# Patient Record
Sex: FEMALE | Race: WHITE | Employment: FULL TIME | URBAN - METROPOLITAN AREA
[De-identification: names, ages, dates, MRNs, and addresses within clinical notes are randomized per-mention and may not be internally consistent; named-entity substitution may affect disease eponyms.]

---

## 2017-01-02 ENCOUNTER — VIRTUAL VISIT (OUTPATIENT)
Dept: FAMILY MEDICINE | Facility: OTHER | Age: 52
End: 2017-01-02

## 2017-01-03 NOTE — PROGRESS NOTES
Date:   Clinician: Aneta Munguia  Clinician NPI: [id number]  Patient: KURT RONDON  Patient : 1965  Patient Address: [address] [place] [address]  Patient Phone: [phone]  Visit Protocol: URI  Patient Summary:  KURT is a 51 year old ( : 1965 ) female who initiated a Zip for a presumed sinus infection.     Her  symptoms started gradually 3 weeks ago  and consist of malaise, fever, nasal congestion, post-nasal drainage, cough, loss of appetite, and rhinitis.   She denies dysphagia, nausea, vomiting, itchy eyes, myalgias, chest pain, dyspnea, chills, sore throat, hoarse voice, and ear pain. She denies a history of facial surgery.   Her profuse nasal secretions are blood tinged and green. Her moderate facial pain or pressure feels worse when bending over or leaning forward and is located on both sides of her head. The facial pain or pressure started after the onset of other URI symptoms. She has teeth pain and is confident the tooth pain is not from a cavity, recent dental work or other mouth problems. KURT has a moderate headache. The headache did not start before her other symptoms and is located on both sides of her head. When asked to feel her neck she reported enlarged lymph nodes. KURT noted that the enlarged neck lymph nodes developed AFTER the onset of upper respiratory symptoms. She denies axillary lymphadenopathy.   Her moderately severe (cough every 5-10 minutes) productive cough is more bothersome at night. She believes the cough is caused by post-nasal drainage. Her cough produces yellow sputum.   She has passed urine in the past 12 hours.   KURT denies having COPD or other chronic lung disease.   Pulse: self-reported pulse rate as: 10.0 beats in 10 seconds.    Weight (in lbs): 210.0   She states she is not pregnant and denies breastfeeding. She no longer menstruates.   KURT does not smoke or use smokeless tobacco.   MEDICATIONS:   Thyroid and hydrochlorothiazide (HCTZ)  , ALLERGIES:  NKDA   Clinician Response:  Dear KURT,  Based on the information you have provided, you likely have  acute sinusitis, otherwise known as a sinus infection.   I am prescribing amoxicillin-Pot Clavulanate [Augmentin] 875-125mg. Take one tablet by mouth two times a day for 10 days. There are no refills with this prescription.   Drink plenty of liquids, especially water and take time to rest your body. This may mean taking a nap or going to bed earlier. Your body is fighting an infection and liquids and rest will improve the pace of recovery. Remember to regularly wash your hands and avoid close contact with others to prevent spreading your infection.   Some people develop allergies to antibiotics. If you notice a new rash, significant swelling or difficulty breathing, stop the medication immediately and go into a clinic for physical evaluation.   Some women may also develop a yeast infection as a side effect of taking antibiotics. If you notice symptoms of a yeast infection, please use Zipnosis to get treatment.   If you become pregnant during this course of treatment with medication, stop taking the medication and contact your primary care clinician.   Diagnosis: Acute Sinusitis  Diagnosis ICD: J01.90  Prescription: amoxicillin-Pot Clavulanate (Augmentin) 875-125 mg oral tablet 20 tablets, 10 days supply. Take one tablet by mouth two times a day for 10 days. Refills: 0, Refill as needed: no, Allow substitutions: yes  Prescription Sent At: January 02 18:46:52, 2017  Pharmacy: [facility] [id number] - [phone] - [address] [facility] [place] [address]  Addendum created: January 02 18:23:29, 2017 created by: Aneta Munguia body: Zithromax 250 mg. 2 tab day 1 and 1 tab days 2-5. Dispense #6. No refill

## 2017-01-04 ENCOUNTER — OFFICE VISIT (OUTPATIENT)
Dept: URGENT CARE | Facility: URGENT CARE | Age: 52
End: 2017-01-04
Payer: COMMERCIAL

## 2017-01-04 ENCOUNTER — RADIANT APPOINTMENT (OUTPATIENT)
Dept: GENERAL RADIOLOGY | Facility: CLINIC | Age: 52
End: 2017-01-04
Attending: FAMILY MEDICINE
Payer: COMMERCIAL

## 2017-01-04 VITALS
OXYGEN SATURATION: 99 % | RESPIRATION RATE: 20 BRPM | HEART RATE: 103 BPM | SYSTOLIC BLOOD PRESSURE: 100 MMHG | TEMPERATURE: 98.1 F | DIASTOLIC BLOOD PRESSURE: 66 MMHG

## 2017-01-04 DIAGNOSIS — R05.9 COUGH: ICD-10-CM

## 2017-01-04 DIAGNOSIS — R06.02 SOB (SHORTNESS OF BREATH): ICD-10-CM

## 2017-01-04 DIAGNOSIS — J18.9 PNEUMONIA DUE TO INFECTIOUS ORGANISM, UNSPECIFIED LATERALITY, UNSPECIFIED PART OF LUNG: Primary | ICD-10-CM

## 2017-01-04 DIAGNOSIS — J98.01 BRONCHOSPASM: ICD-10-CM

## 2017-01-04 PROCEDURE — 94640 AIRWAY INHALATION TREATMENT: CPT | Performed by: FAMILY MEDICINE

## 2017-01-04 PROCEDURE — 99214 OFFICE O/P EST MOD 30 MIN: CPT | Mod: 25 | Performed by: FAMILY MEDICINE

## 2017-01-04 PROCEDURE — 71020 XR CHEST 2 VW: CPT

## 2017-01-04 RX ORDER — PREDNISONE 20 MG/1
40 TABLET ORAL DAILY
Qty: 10 TABLET | Refills: 0 | Status: SHIPPED | OUTPATIENT
Start: 2017-01-04 | End: 2017-01-09

## 2017-01-04 RX ORDER — CODEINE PHOSPHATE AND GUAIFENESIN 10; 100 MG/5ML; MG/5ML
2 SOLUTION ORAL EVERY 4 HOURS PRN
Qty: 120 ML | Refills: 0 | Status: SHIPPED | OUTPATIENT
Start: 2017-01-04 | End: 2017-02-20

## 2017-01-04 RX ORDER — IPRATROPIUM BROMIDE AND ALBUTEROL SULFATE 2.5; .5 MG/3ML; MG/3ML
1 SOLUTION RESPIRATORY (INHALATION) ONCE
Qty: 3 ML | Refills: 0
Start: 2017-01-04 | End: 2017-01-04

## 2017-01-04 RX ORDER — DOXYCYCLINE 100 MG/1
100 CAPSULE ORAL 2 TIMES DAILY
Qty: 20 CAPSULE | Refills: 0 | Status: SHIPPED | OUTPATIENT
Start: 2017-01-04 | End: 2017-02-20

## 2017-01-04 NOTE — MR AVS SNAPSHOT
After Visit Summary   1/4/2017    Amita Finley    MRN: [id number]           Patient Information     Date Of Birth          1965        Visit Information        Provider Department      1/4/2017 2:45 PM Fam Lopez MD [facility]        Today's Diagnoses     Pneumonia due to infectious organism, unspecified laterality, unspecified part of lung    -  1     SOB (shortness of breath)         Bronchospasm         Cough           Care Instructions    Take full course of antibiotic (okay to finish Zpak, add doxycyline)  Okay to take ibuprofen 200 mg - 4 tablets (800 mg) every 8 hours as needed.  Okay to take tylenol 500 mg - 2 tablets (1000 mg) every 6-8 hours as needed, do not exceed 3000 mg in 24 hours.  Take full course of prednisone for wheezing and shortness of breath.  Use combivent inhaler (this already has albuterol) every 4 hours as needed.  Use robitussin with codeine at bedtime to help with cough.      Pneumonia (Adult)  Pneumonia is an infection deep within the lungs. It is in the small air sacs (alveoli). Pneumonia may be caused by a virus or bacteria. Pneumonia caused by bacteria is usually treated with an antibiotic. Severe cases may need to be treated in the hospital. Milder cases can be treated at home. Symptoms usually start to get better during the first 2 days of treatment.    Home care  Follow these guidelines when caring for yourself at home:    Rest at home for the first 2 to 3 days, or until you feel stronger. Don t let yourself get overly tired when you go back to your activities.    Stay away from cigarette smoke - yours or other people s.    You may use acetaminophen or ibuprofen to control fever or pain, unless another medicine was prescribed. If you have chronic liver or kidney disease, talk with your health care provider before using these medicines. Also talk with your provider if you ve had a stomach ulcer or GI bleeding. Don t give aspirin to  anyone younger than 18 years of age who is ill with a fever. It may cause severe liver damage.    Your appetite may be poor, so a light diet is fine.    Drink 6 to 8 glasses of fluids every day to make sure you are getting enough fluids. Beverages can include water, sport drinks, sodas without caffeine, juices, tea, or soup. Fluids will help loosen secretions in the lung. This will make it easier for you to cough up the phlegm (sputum). If you also have heart or kidney disease, check with your health care provider before you drink extra fluids.    Take antibiotic medicine prescribed until it is all gone, even if you are feeling better after a few days.  Follow-up care  Follow up with your health care provider in the next 2 to 3 days, or as advised. This is to be sure the medicine is helping you get better.  If you are 65 or older, you should get a pneumococcal vaccine and a yearly flu (influenza) shot. You should also get these vaccines if you have chronic lung disease like asthma, emphysema, or COPD. Ask your provider about this.  When to seek medical advice  Call your health care provider right away if any of these occur:    You don t get better within the first 48 hours of treatment    Shortness of breath gets worse    Rapid breathing (more than 25 breaths per minute)    Coughing up blood    Chest pain gets worse with breathing    Fever of 102 F (38 C) or higher that doesn t get better with fever medicine    Weakness, dizziness, or fainting that gets worse    Thirst or dry mouth that gets worse    Sinus pain, headache, or a stiff neck    Chest pain not caused by coughing    [id number] [employer]. [address] [place] [address], [place] [address]. All rights reserved. This information is not intended as a substitute for professional medical care. Always follow your healthcare professional's instructions.              Follow-ups after your visit        Your next 10 appointments already scheduled     Jan 12,  2017  1:15 PM   MA SCREENING DIGITAL BILATERAL with [id number]   [facility] ([facility])    [address] [facility], [address]  [facility] [id number] [phone]           Do not use any powder, lotion or deodorant under your arms or on your breast. If you do, we will ask you to remove it before your exam.  Wear comfortable, two-piece clothing.  If you have any allergies, tell your care team.  Bring any previous mammograms from other facilities or have them mailed to the breast center. This mammogram location, [facility], now offers 3D mammography. It doesn't replace a screening mammogram and can be done with a regular screening mammogram. It is optional and not all insurances will pay for it. 3D mammography is a special kind of mammogram that produces a three-dimensional image of the breast by using low dose-xrays. 3D allows the radiologist to see the breast tissue differently from 2D, which reduces the chance of repeat testing due to overlapping breast tissue. If you are interested in have a 3D mammogram, please check with your insurance before you arrive for your exam. On the day of your exam you will be asked if you would like 3D imaging.            Jan 13, 2017   Procedure with Ginny Caro MD   [facility] ([facility])    [address] [place]  [facility] [address]   [phone]           [facility] is located at [address] [facility]. [place]              Who to contact     If you have questions or need follow up information about today's clinic visit or your schedule please contact [facility] directly at [phone].  Normal or non-critical lab and imaging results will be communicated to you by MyChart, letter or phone within 4 business days after the clinic has received the results. If you do not hear from us within 7 days, please contact the clinic through MyChart or phone. If you have a  critical or abnormal lab result, we will notify you by phone as soon as possible.  Submit refill requests through [employer] or call your pharmacy and they will forward the refill request to us. Please allow 3 business days for your refill to be completed.          Additional Information About Your Visit        [employer] Information     [employer] gives you secure access to your electronic health record. If you see a primary care provider, you can also send messages to your care team and make appointments. If you have questions, please call your primary care clinic.  If you do not have a primary care provider, please call [phone] and they will assist you.        Care EveryWhere ID     This is your Care EveryWhere ID. This could be used by other organizations to access your [place] medical records  [id number]        Your Vitals Were     Pulse Temperature Respirations Pulse Oximetry          103 98.1  F (36.7  C) (Oral) 20 99%         Blood Pressure from Last 3 Encounters:   01/04/17 100/66   12/07/16 111/69   11/28/16 116/78    Weight from Last 3 Encounters:   12/07/16 207 lb 12.8 oz (94.257 kg)   11/28/16 206 lb (93.441 kg)   10/17/16 200 lb (90.719 kg)              We Performed the Following     INHALATION/NEBULIZER TREATMENT, INITIAL          Today's Medication Changes          These changes are accurate as of: 1/4/17  4:15 PM.  If you have any questions, ask your nurse or doctor.               Start taking these medicines.        Dose/Directions    doxycycline 100 MG capsule   Commonly known as:  VIBRAMYCIN   Used for:  Pneumonia due to infectious organism, unspecified laterality, unspecified part of lung   Started by:  Fam Lopez MD        Dose:  100 mg   Take 1 capsule (100 mg) by mouth 2 times daily   Quantity:  20 capsule   Refills:  0       guaiFENesin-codeine 100-10 MG/5ML Soln solution   Commonly known as:  ROBITUSSIN AC   Used for:  Cough   Started by:  Fam Lopez MD        Dose:  2 tsp.   Take 10 mLs  by mouth every 4 hours as needed for cough   Quantity:  120 mL   Refills:  0       * ipratropium - albuterol 0.5 mg/2.5 mg/3 mL 0.5-2.5 (3) MG/3ML neb solution   Commonly known as:  DUONEB   Used for:  SOB (shortness of breath)   Started by:  Fam Lopez MD        Dose:  1 vial   Take 1 vial (3 mLs) by nebulization once for 1 dose   Quantity:  3 mL   Refills:  0       * Ipratropium-Albuterol  MCG/ACT inhaler   Commonly known as:  COMBIVENT RESPIMAT   Used for:  SOB (shortness of breath)   Started by:  Fam Lopez MD        Dose:  1 puff   Inhale 1 puff into the lungs 4 times daily Not to exceed 6 doses per day.   Quantity:  1 Inhaler   Refills:  1       predniSONE 20 MG tablet   Commonly known as:  DELTASONE   Used for:  Bronchospasm   Started by:  Fam Lopez MD        Dose:  40 mg   Take 2 tablets (40 mg) by mouth daily for 5 days   Quantity:  10 tablet   Refills:  0       * Notice:  This list has 2 medication(s) that are the same as other medications prescribed for you. Read the directions carefully, and ask your doctor or other care provider to review them with you.         Where to get your medicines      These medications were sent to [facility] - [address]   [address]  [address], [place] [address]     Phone:  [phone]    - doxycycline 100 MG capsule  - Ipratropium-Albuterol  MCG/ACT inhaler  - predniSONE 20 MG tablet      Some of these will need a paper prescription and others can be bought over the counter.  Ask your nurse if you have questions.     Bring a paper prescription for each of these medications    - guaiFENesin-codeine 100-10 MG/5ML Soln solution    You don't need a prescription for these medications    - ipratropium - albuterol 0.5 mg/2.5 mg/3 mL 0.5-2.5 (3) MG/3ML neb solution             Primary Care Provider Office Phone # Fax #    Amanda Barros [phone]       [facility] [address]  [place] [address]        Thank you!     Thank you for choosing [facility]  for your care. Our goal is always to provide you with excellent care. Hearing back from our patients is one way we can continue to improve our services. Please take a few minutes to complete the written survey that you may receive in the mail after your visit with us. Thank you!             Your Updated Medication List - Protect others around you: Learn how to safely use, store and throw away your medicines at www.disposemymeds.org.          This list is accurate as of: 1/4/17  4:15 PM.  Always use your most recent med list.                   Brand Name Dispense Instructions for use    diclofenac 1 % Gel topical gel    VOLTAREN    100 g    Apply 4 grams to ankle or 2 grams to hands four times daily using enclosed dosing card.       doxycycline 100 MG capsule    VIBRAMYCIN    20 capsule    Take 1 capsule (100 mg) by mouth 2 times daily       guaiFENesin-codeine 100-10 MG/5ML Soln solution    ROBITUSSIN AC    120 mL    Take 10 mLs by mouth every 4 hours as needed for cough       hydrochlorothiazide 25 MG tablet    HYDRODIURIL    30 tablet    Take 1 tablet (25 mg) by mouth daily       * ipratropium - albuterol 0.5 mg/2.5 mg/3 mL 0.5-2.5 (3) MG/3ML neb solution    DUONEB    3 mL    Take 1 vial (3 mLs) by nebulization once for 1 dose       * Ipratropium-Albuterol  MCG/ACT inhaler    COMBIVENT RESPIMAT    1 Inhaler    Inhale 1 puff into the lungs 4 times daily Not to exceed 6 doses per day.       * levothyroxine 125 MCG tablet    SYNTHROID/LEVOTHROID    15 tablet    Take 1 pill every other day alternating with 137 mcg.       * levothyroxine 137 MCG tablet    SYNTHROID/LEVOTHROID    15 tablet    Take one every other day alternating with 125mcg dose       predniSONE 20 MG tablet    DELTASONE    10 tablet    Take 2 tablets (40 mg) by mouth daily for 5 days       spironolactone 50 MG tablet    ALDACTONE    60 tablet     Take 1-2 tablets ( mg) by mouth daily       traZODone 50 MG tablet    DESYREL    180 tablet    Take 2 tablets (100 mg) by mouth nightly as needed for sleep       * Notice:  This list has 4 medication(s) that are the same as other medications prescribed for you. Read the directions carefully, and ask your doctor or other care provider to review them with you.

## 2017-01-04 NOTE — NURSING NOTE
"Chief Complaint   Patient presents with     Urgent Care     Cough     x 2 1/2 wks, crackling, SOB, unable to sleep, Pt had virtual visit, given zpak for sinus infection, pt concerned with SOB, pt reports using albuterol inhaler with no relief       Initial /66 mmHg  Pulse 103  Temp(Src) 98.1  F (36.7  C) (Oral)  Resp 20  SpO2 99% Estimated body mass index is 33.04 kg/(m^2) as calculated from the following:    Height as of 12/7/16: 5' 6.5\" (1.689 m).    Weight as of 12/7/16: 207 lb 12.8 oz (94.257 kg).  BP completed using cuff size: aleksandar Valadez CMA                                1/4/2017 2:49 PM       "

## 2017-01-04 NOTE — PROGRESS NOTES
"SUBJECTIVE:   Amita Filney is a 51 year old female presenting with a chief complaint of \"cold symptoms\".  Initially had cold symptoms mid-December, seemed to get a little better but over the weekend developed more cough and SOB.  Patient will wake up from coughing, hearing crackles.  Patient was treated thru Zipnosis for sinus infection with Zpak but feels like cough did not improve.  Patient had flu vaccination thru work.  Onset of symptoms was 2 week(s) ago.  Course of illness is worsening.    Severity moderate  Current and Associated symptoms: cough, SOB  Treatment measures tried include Fluids, OTC meds, Rest, Inhaler (name: albuterol) and Zpak.  Predisposing factors include : prior smoker.    Past Medical History   Diagnosis Date     Hypothyroidism      Antiphospholipid antibodies positive 12/14/2011     Baker's cyst of knee      Obesity      Current Outpatient Prescriptions   Medication Sig Dispense Refill     levothyroxine (SYNTHROID/LEVOTHROID) 125 MCG tablet Take 1 pill every other day alternating with 137 mcg. 15 tablet 8     levothyroxine (SYNTHROID/LEVOTHROID) 137 MCG tablet Take one every other day alternating with 125mcg dose 15 tablet 8     traZODone (DESYREL) 50 MG tablet Take 2 tablets (100 mg) by mouth nightly as needed for sleep 180 tablet 1     hydrochlorothiazide (HYDRODIURIL) 25 MG tablet Take 1 tablet (25 mg) by mouth daily 30 tablet 1     spironolactone (ALDACTONE) 50 MG tablet Take 1-2 tablets ( mg) by mouth daily 60 tablet 3     diclofenac (VOLTAREN) 1 % GEL Apply 4 grams to ankle or 2 grams to hands four times daily using enclosed dosing card. 100 g 1     Social History   Substance Use Topics     Smoking status: Former Smoker -- 1.50 packs/day for 20 years     Types: Cigarettes     Quit date: 01/01/1996     Smokeless tobacco: Never Used     Alcohol Use: 1.8 oz/week     3 Standard drinks or equivalent per week       ROS:  CONSTITUTIONAL:POSITIVE  for fatigue, malaise and " myalgias  INTEGUMENTARY/SKIN: NEGATIVE for worrisome rashes, moles or lesions  ENT/MOUTH: POSITIVE for hoarseness, nasal congestion, sinus pressure and sore throat  RESP:POSITIVE for cough-non productive, cough-productive, SOB/dyspnea and wheezing  CV: NEGATIVE for chest pain, palpitations or peripheral edema  GI: NEGATIVE for nausea, abdominal pain, heartburn, or change in bowel habits    OBJECTIVE  :/66 mmHg  Pulse 103  Temp(Src) 98.1  F (36.7  C) (Oral)  Resp 20  SpO2 99%  GENERAL APPEARANCE: healthy, alert and no distress  EYES: EOMI,  PERRL, conjunctiva clear  HENT: ear canals and TM's normal.  Nose and mouth without ulcers, erythema or lesions.  Tenderness on maxillary sinuses on percussion  NECK: supple, nontender, no lymphadenopathy  RESP: lungs with wheezes and rhonchi, improved after duoneb treatment with scattered wheezes, decrease BS on right  CV: regular rates and rhythm, normal S1 S2, no murmur noted  NEURO: Normal strength and tone, sensory exam grossly normal,  normal speech and mentation  SKIN: no suspicious lesions or rashes    CXR - patchy infiltrate on right lower lung, no pleural effusion, no pneumothorax    ASSESSMENT/PLAN:  (J18.9) Pneumonia due to infectious organism, unspecified laterality, unspecified part of lung  (primary encounter diagnosis)  Plan: doxycycline (VIBRAMYCIN) 100 MG capsule            (R06.02) SOB (shortness of breath)  Comment: bronchospasm/pneumonia  Plan: XR Chest 2 Views, ipratropium - albuterol 0.5         mg/2.5 mg/3 mL (DUONEB) 0.5-2.5 (3) MG/3ML neb         solution, INHALATION/NEBULIZER TREATMENT,         INITIAL, Ipratropium-Albuterol (COMBIVENT         RESPIMAT)  MCG/ACT inhaler            (J98.01) Bronchospasm  Plan: predniSONE (DELTASONE) 20 MG tablet            (R05) Cough  Plan: guaiFENesin-codeine (ROBITUSSIN AC) 100-10         MG/5ML SOLN solution            Reviewed symptomatic treatment, plenty of fluids and rest.  Will add doxycycline for  probable early pneumonia.  RX for prednisone burst for bronchospasm, encourage to continue with albuterol inhaler to help with symptoms.  RX for Anoro Ellipta given (combivent too expensive).  RX for margoth AC given to help with cough.    Follow up with primary in 2 days, to ER if symptoms worsens.    Fam Lopez MD  January 4, 2017 4:50 PM

## 2017-01-04 NOTE — PATIENT INSTRUCTIONS
Take full course of antibiotic (okay to finish Zpak, add doxycyline)  Okay to take ibuprofen 200 mg - 4 tablets (800 mg) every 8 hours as needed.  Okay to take tylenol 500 mg - 2 tablets (1000 mg) every 6-8 hours as needed, do not exceed 3000 mg in 24 hours.  Take full course of prednisone for wheezing and shortness of breath.  Use combivent inhaler (this already has albuterol) every 4 hours as needed.  Use robitussin with codeine at bedtime to help with cough.      Pneumonia (Adult)  Pneumonia is an infection deep within the lungs. It is in the small air sacs (alveoli). Pneumonia may be caused by a virus or bacteria. Pneumonia caused by bacteria is usually treated with an antibiotic. Severe cases may need to be treated in the hospital. Milder cases can be treated at home. Symptoms usually start to get better during the first 2 days of treatment.    Home care  Follow these guidelines when caring for yourself at home:    Rest at home for the first 2 to 3 days, or until you feel stronger. Don t let yourself get overly tired when you go back to your activities.    Stay away from cigarette smoke - yours or other people s.    You may use acetaminophen or ibuprofen to control fever or pain, unless another medicine was prescribed. If you have chronic liver or kidney disease, talk with your health care provider before using these medicines. Also talk with your provider if you ve had a stomach ulcer or GI bleeding. Don t give aspirin to anyone younger than 18 years of age who is ill with a fever. It may cause severe liver damage.    Your appetite may be poor, so a light diet is fine.    Drink 6 to 8 glasses of fluids every day to make sure you are getting enough fluids. Beverages can include water, sport drinks, sodas without caffeine, juices, tea, or soup. Fluids will help loosen secretions in the lung. This will make it easier for you to cough up the phlegm (sputum). If you also have heart or kidney disease, check with  your health care provider before you drink extra fluids.    Take antibiotic medicine prescribed until it is all gone, even if you are feeling better after a few days.  Follow-up care  Follow up with your health care provider in the next 2 to 3 days, or as advised. This is to be sure the medicine is helping you get better.  If you are 65 or older, you should get a pneumococcal vaccine and a yearly flu (influenza) shot. You should also get these vaccines if you have chronic lung disease like asthma, emphysema, or COPD. Ask your provider about this.  When to seek medical advice  Call your health care provider right away if any of these occur:    You don t get better within the first 48 hours of treatment    Shortness of breath gets worse    Rapid breathing (more than 25 breaths per minute)    Coughing up blood    Chest pain gets worse with breathing    Fever of 102 F (38 C) or higher that doesn t get better with fever medicine    Weakness, dizziness, or fainting that gets worse    Thirst or dry mouth that gets worse    Sinus pain, headache, or a stiff neck    Chest pain not caused by coughing    1702-1063 [employer]. [address] [place] [address], [place] [address]. All rights reserved. This information is not intended as a substitute for professional medical care. Always follow your healthcare professional's instructions.

## 2017-01-04 NOTE — NURSING NOTE
The following nebulizer treatment was given:     MEDICATION: Duoneb  : [employer]  LOT #: [id number]  EXPIRATION DATE:  09/2018  NDC # 9660-9627-97    Tonia Valadez CMA                                1/4/2017 4:23 PM

## 2017-01-24 DIAGNOSIS — R60.0 EDEMA OF BOTH LEGS: Primary | ICD-10-CM

## 2017-01-24 NOTE — TELEPHONE ENCOUNTER
HCTZ 25MG      Last Written Prescription Date: 11/10/2016  Last Fill Quantity: 30, # refills: 1  Last Office Visit with [facility], [facility] or [facility] prescribing provider: 11/28/2016       POTASSIUM   Date Value Ref Range Status   11/28/2016 3.8 3.4 - 5.3 mmol/L Final     CREATININE   Date Value Ref Range Status   11/28/2016 0.93 0.52 - 1.04 mg/dL Final     BP Readings from Last 3 Encounters:   01/04/17 100/66   12/07/16 111/69   11/28/16 116/78

## 2017-01-25 RX ORDER — HYDROCHLOROTHIAZIDE 25 MG/1
25 TABLET ORAL DAILY
Qty: 90 TABLET | Refills: 2 | Status: SHIPPED | OUTPATIENT
Start: 2017-01-25 | End: 2017-11-13

## 2017-01-25 NOTE — TELEPHONE ENCOUNTER
Prescription approved per [facility] Refill Protocol.  Kimi Hunt RN  Message handled by Nurse Triage.

## 2017-02-20 ENCOUNTER — OFFICE VISIT (OUTPATIENT)
Dept: PEDIATRICS | Facility: CLINIC | Age: 52
End: 2017-02-20
Payer: COMMERCIAL

## 2017-02-20 VITALS
BODY MASS INDEX: 34.7 KG/M2 | SYSTOLIC BLOOD PRESSURE: 114 MMHG | WEIGHT: 215.9 LBS | DIASTOLIC BLOOD PRESSURE: 72 MMHG | HEART RATE: 88 BPM | HEIGHT: 66 IN | OXYGEN SATURATION: 99 % | TEMPERATURE: 98.2 F

## 2017-02-20 DIAGNOSIS — E03.9 HYPOTHYROIDISM, UNSPECIFIED TYPE: ICD-10-CM

## 2017-02-20 DIAGNOSIS — F41.9 ANXIETY: Primary | ICD-10-CM

## 2017-02-20 DIAGNOSIS — E66.09 NON MORBID OBESITY DUE TO EXCESS CALORIES: ICD-10-CM

## 2017-02-20 DIAGNOSIS — F32.1 MAJOR DEPRESSIVE DISORDER, SINGLE EPISODE, MODERATE (H): ICD-10-CM

## 2017-02-20 LAB
T4 FREE SERPL-MCNC: 1.06 NG/DL (ref 0.76–1.46)
TSH SERPL DL<=0.005 MIU/L-ACNC: 4.5 MU/L (ref 0.4–4)

## 2017-02-20 PROCEDURE — 36415 COLL VENOUS BLD VENIPUNCTURE: CPT | Performed by: INTERNAL MEDICINE

## 2017-02-20 PROCEDURE — 84443 ASSAY THYROID STIM HORMONE: CPT | Performed by: INTERNAL MEDICINE

## 2017-02-20 PROCEDURE — 99214 OFFICE O/P EST MOD 30 MIN: CPT | Performed by: INTERNAL MEDICINE

## 2017-02-20 PROCEDURE — 84439 ASSAY OF FREE THYROXINE: CPT | Performed by: INTERNAL MEDICINE

## 2017-02-20 RX ORDER — HYDROXYZINE HYDROCHLORIDE 25 MG/1
25-50 TABLET, FILM COATED ORAL EVERY 6 HOURS PRN
Qty: 60 TABLET | Refills: 1 | Status: SHIPPED | OUTPATIENT
Start: 2017-02-20 | End: 2018-09-25

## 2017-02-20 ASSESSMENT — ANXIETY QUESTIONNAIRES
3. WORRYING TOO MUCH ABOUT DIFFERENT THINGS: NEARLY EVERY DAY
IF YOU CHECKED OFF ANY PROBLEMS ON THIS QUESTIONNAIRE, HOW DIFFICULT HAVE THESE PROBLEMS MADE IT FOR YOU TO DO YOUR WORK, TAKE CARE OF THINGS AT HOME, OR GET ALONG WITH OTHER PEOPLE: VERY DIFFICULT
GAD7 TOTAL SCORE: 17
6. BECOMING EASILY ANNOYED OR IRRITABLE: MORE THAN HALF THE DAYS
5. BEING SO RESTLESS THAT IT IS HARD TO SIT STILL: MORE THAN HALF THE DAYS
7. FEELING AFRAID AS IF SOMETHING AWFUL MIGHT HAPPEN: MORE THAN HALF THE DAYS
1. FEELING NERVOUS, ANXIOUS, OR ON EDGE: NEARLY EVERY DAY
2. NOT BEING ABLE TO STOP OR CONTROL WORRYING: NEARLY EVERY DAY

## 2017-02-20 ASSESSMENT — PATIENT HEALTH QUESTIONNAIRE - PHQ9: 5. POOR APPETITE OR OVEREATING: MORE THAN HALF THE DAYS

## 2017-02-20 NOTE — MR AVS SNAPSHOT
After Visit Summary   2/20/2017    Amita Finley    MRN: [id number]           Patient Information     Date Of Birth          1965        Visit Information        Provider Department      2/20/2017 9:20 AM Amanda Barros MD [facility]'s Diagnoses     Anxiety    -  1    Major depressive disorder, single episode, moderate (H)        Hypothyroidism, unspecified type          Care Instructions    1. Check thyroid today  2. Start sertraline (zoloft) 50 mg once a day  - if have side effects, can cut in half for a few days and then go back up  3. Follow-up in 1 month (can do phone or e-visit if that is easier)  4. Look into on-line therapy options    Here are some other options in the area for in-person therapy  1. Meenakshi Das - [facility] in [place] - [phone]  2. Denia Dillard - [facility] - [phone]  3. Shannon Montes [facility] in [place] - [phone]          Follow-ups after your visit        Who to contact     If you have questions or need follow up information about today's clinic visit or your schedule please contact [facility] directly at [phone].  Normal or non-critical lab and imaging results will be communicated to you by R2Ghart, letter or phone within 4 business days after the clinic has received the results. If you do not hear from us within 7 days, please contact the clinic through R2Ghart or phone. If you have a critical or abnormal lab result, we will notify you by phone as soon as possible.  Submit refill requests through [employer]uline or call your pharmacy and they will forward the refill request to us. Please allow 3 business days for your refill to be completed.          Additional Information About Your Visit        R2GharUtterz Information     [employer] gives you secure access to your electronic health record. If you see a primary care  "provider, you can also send messages to your care team and make appointments. If you have questions, please call your primary care clinic.  If you do not have a primary care provider, please call [phone] and they will assist you.        Care EveryWhere ID     This is your Care EveryWhere ID. This could be used by other organizations to access your [facility] medical records  [id number]        Your Vitals Were     Pulse Temperature Height Pulse Oximetry BMI (Body Mass Index)       88 98.2  F (36.8  C) (Tympanic) 5' 6\" (1.676 m) 99% 34.85 kg/m2        Blood Pressure from Last 3 Encounters:   02/20/17 114/72   01/04/17 100/66   12/07/16 111/69    Weight from Last 3 Encounters:   02/20/17 215 lb 14.4 oz (97.9 kg)   12/07/16 207 lb 12.8 oz (94.3 kg)   11/28/16 206 lb (93.4 kg)              We Performed the Following     TSH with free T4 reflex          Today's Medication Changes          These changes are accurate as of: 2/20/17 10:13 AM.  If you have any questions, ask your nurse or doctor.               Start taking these medicines.        Dose/Directions    sertraline 50 MG tablet   Commonly known as:  ZOLOFT   Used for:  Anxiety, Major depressive disorder, single episode, moderate (H)   Started by:  Amanda Barros MD        Dose:  50 mg   Take 1 tablet (50 mg) by mouth daily   Quantity:  30 tablet   Refills:  1            Where to get your medicines      These medications were sent to [facility] - [address]   [address], [place] [address]     Phone:  [phone]     sertraline 50 MG tablet                Primary Care Provider Office Phone # Fax #    Amanda Barros [phone]       [facility] [place] [address] [place] [address]        Thank you!     [facility] you [facility]  [facility] your care. Our goal is always to provide you with excellent care. Hearing back from our " patients is one way we can continue to improve our services. Please take a few minutes to complete the written survey that you may receive in the mail after your visit with us. Thank you!             Your Updated Medication List - Protect others around you: Learn how to safely use, store and throw away your medicines at www.disposemymeds.org.          This list is accurate as of: 2/20/17 10:13 AM.  Always use your most recent med list.                   Brand Name Dispense Instructions for use    diclofenac 1 % Gel topical gel    VOLTAREN    100 g    Apply 4 grams to ankle or 2 grams to hands four times daily using enclosed dosing card.       hydrochlorothiazide 25 MG tablet    HYDRODIURIL    90 tablet    Take 1 tablet (25 mg) by mouth daily       * levothyroxine 125 MCG tablet    SYNTHROID/LEVOTHROID    15 tablet    Take 1 pill every other day alternating with 137 mcg.       * levothyroxine 137 MCG tablet    SYNTHROID/LEVOTHROID    15 tablet    Take one every other day alternating with 125mcg dose       sertraline 50 MG tablet    ZOLOFT    30 tablet    Take 1 tablet (50 mg) by mouth daily       spironolactone 50 MG tablet    ALDACTONE    60 tablet    Take 1-2 tablets ( mg) by mouth daily       traZODone 50 MG tablet    DESYREL    180 tablet    Take 2 tablets (100 mg) by mouth nightly as needed for sleep       * Notice:  This list has 2 medication(s) that are the same as other medications prescribed for you. Read the directions carefully, and ask your doctor or other care provider to review them with you.

## 2017-02-20 NOTE — PATIENT INSTRUCTIONS
1. Check thyroid today  2. Start sertraline (zoloft) 50 mg once a day  - if have side effects, can cut in half for a few days and then go back up  3. Follow-up in 1 month (can do phone or e-visit if that is easier)  4. Look into on-line therapy options    Here are some other options in the area for in-person therapy  1. Meenakshi Das - [facility] in [place] - [phone]  2. Denia Dillard - [facility] in [place] - [phone]  3. Shannon Montes - [facility] in [place] - [phone]

## 2017-02-20 NOTE — NURSING NOTE
"Chief Complaint   Patient presents with     Anxiety     Depression       Initial /72 (BP Location: Right arm, Patient Position: Chair, Cuff Size: Adult Large)  Pulse 88  Temp 98.2  F (36.8  C) (Tympanic)  Ht 5' 6\" (1.676 m)  Wt 215 lb 14.4 oz (97.9 kg)  SpO2 99%  BMI 34.85 kg/m2 Estimated body mass index is 34.85 kg/(m^2) as calculated from the following:    Height as of this encounter: 5' 6\" (1.676 m).    Weight as of this encounter: 215 lb 14.4 oz (97.9 kg).  Medication Reconciliation: complete   Niya Brandon LPN      "

## 2017-02-20 NOTE — PROGRESS NOTES
SUBJECTIVE:                                                    Amita Finley is a 51 year old female who presents to clinic today for the following health issues:    Abnormal Mood Symptoms      Duration: 6 months off and on but worsening    Description:  Depression: YES  Anxiety: YES  Panic attacks: YES     Accompanying signs and symptoms: see PHQ-9 and BROOKS scores    History (similar episodes/previous evaluation): postpartum 19 years ago    Precipitating or alleviating factors: father with dementia and no other caregivers, works full time +, family issues    Therapies tried and outcome: none    52 y/o F with history of post-partum depression in the past with 6 months of worsening anxiety and depression symptoms. Having panic attack symptoms 2-3 times a week with increased activity, feels like heart is racing. Happens when starts to think about too many things. Will last for 30 minutes or so. Most often at night, but happens other times too. Not sleeping well. Has been using trazodone at times, but tries not to use for 4 or more nights in a row because doesn't want to stop working. Has a lot of stressors. Father with progressive dementia. Mother trying to cover it up because does not want her lifestyle to change. Currently down in [place] for the winter, but will be returning home soon and needs to sell their house and find them a different place to live. Has been functional at work and home, but does not feel well. Also frustrated about weight gain. Unable to kickbox with hematoma in right lower leg that keeps bleeding and has difficulty finding times to get the exercise in. Has some suicidal ideation, but willing to contract for safety. Reports would never do anything because it would impact her children.     -------------------------------------    Problem list and histories reviewed & adjusted, as indicated.  Additional history: as documented    ROS:  Constitutional, HEENT, cardiovascular, pulmonary, gi and gu  "systems are negative, except as otherwise noted.    Problem list, Medication list, Allergies, and Medical/Social/Surgical histories reviewed in EPIC and updated as appropriate.    OBJECTIVE:                                                    /72 (BP Location: Right arm, Patient Position: Chair, Cuff Size: Adult Large)  Pulse 88  Temp 98.2  F (36.8  C) (Tympanic)  Ht 5' 6\" (1.676 m)  Wt 215 lb 14.4 oz (97.9 kg)  SpO2 99%  BMI 34.85 kg/m2   Body mass index is 34.85 kg/(m^2).  General Appearance: healthy, alert and no distress  Eyes:   no discharge, erythema.  Normal pupils.  Psychiatric: affect flat and anxious    Diagnostic Test Results:  Results for orders placed or performed in visit on 02/20/17   TSH with free T4 reflex   Result Value Ref Range    TSH 4.50 (H) 0.40 - 4.00 mU/L   T4 free   Result Value Ref Range    T4 Free 1.06 0.76 - 1.46 ng/dL        ASSESSMENT/PLAN:                                                      (F41.9) Anxiety  (primary encounter diagnosis)  (F32.1) Major depressive disorder, single episode, moderate (H)  Comment: many stressors  Plan: sertraline (ZOLOFT) 50 MG tablet, hydrOXYzine         (ATARAX) 25 MG tablet, T4 free  - start sertraline at 50 mg once a day - can cut in half if has side effects for a few days and then go back up - discussed possible side effects and timeline for effectiveness  - hydroxyzine as needed for panic attacks. Discussed side effects. Also discussed possible benzos, but does not want to do this with work at this time  - discussed starting with a therapist - pt would like to look into online options as has time constraints, but numbers also given  - discussed lifestyle changes and self cares. Would recommend taking trazodone slightly more often for awhile to try to ensure she is getting adequate sleep, plans to increase exercise  - f/u in 1 month    (E03.9) Hypothyroidism, unspecified type  Comment: TSH slightly elevated, but free T4 right in middle of " "normal range. Very unlikely to be causing her symptoms at this level  Plan: TSH with free T4 reflex  - recommend continue current dose and recheck levels in 8-12 weeks  - if continues in upper end of range or just above, would increase levothyroxine to 137 mcg 4 days/week and 125 mcg 3 days/week    (E66.09) Obesity  Comment: weight increasing. Likely due to inactivity, but borderline thyroid may contribute some  Plan:   - has plan for starting a new fitness program    BMI:   Estimated body mass index is 34.85 kg/(m^2) as calculated from the following:    Height as of this encounter: 5' 6\" (1.676 m).    Weight as of this encounter: 215 lb 14.4 oz (97.9 kg).   Weight management plan: Discussed healthy diet and exercise guidelines and patient will follow up in 6 months in clinic to re-evaluate.      Follow up with Provider - 1 month     [facility] [place]          "

## 2017-02-21 PROBLEM — F32.1 MAJOR DEPRESSIVE DISORDER, SINGLE EPISODE, MODERATE (H): Status: ACTIVE | Noted: 2017-02-21

## 2017-02-21 PROBLEM — F41.9 ANXIETY: Status: ACTIVE | Noted: 2017-02-21

## 2017-02-21 ASSESSMENT — PATIENT HEALTH QUESTIONNAIRE - PHQ9: SUM OF ALL RESPONSES TO PHQ QUESTIONS 1-9: 18

## 2017-02-21 ASSESSMENT — ANXIETY QUESTIONNAIRES: GAD7 TOTAL SCORE: 17

## 2017-03-03 ENCOUNTER — MYC REFILL (OUTPATIENT)
Dept: PEDIATRICS | Facility: CLINIC | Age: 52
End: 2017-03-03

## 2017-03-03 DIAGNOSIS — R60.0 EDEMA OF BOTH LEGS: ICD-10-CM

## 2017-03-03 RX ORDER — SPIRONOLACTONE 50 MG/1
50-100 TABLET, FILM COATED ORAL DAILY
Qty: 60 TABLET | Refills: 5 | Status: SHIPPED | OUTPATIENT
Start: 2017-03-03 | End: 2017-09-13

## 2017-03-03 NOTE — TELEPHONE ENCOUNTER
Message from "[employer]hart:  Original authorizing provider: MD Brynn Rogers S Malia would like a refill of the following medications:  spironolactone (ALDACTONE) 50 MG tablet [Amanda Barros MD]    Preferred pharmacy: [facility] [id number] [place] [address] [address]  [address] [facility]    Comment:  No refills on this RX. Would you please refill when able. thank you and have a good weekend.

## 2017-03-03 NOTE — TELEPHONE ENCOUNTER
Refilled for 6 months.      Aldactone 50 mg 1-2 tabs qd    Last Written Prescription Date: 10/19/16  Last Fill Quantity: 60, # refills: 0  Last Office Visit with [facility] or [facility] prescribing provider: 02/20/17       Potassium   Date Value Ref Range Status   11/28/2016 3.8 3.4 - 5.3 mmol/L Final     Creatinine   Date Value Ref Range Status   11/28/2016 0.93 0.52 - 1.04 mg/dL Final     BP Readings from Last 3 Encounters:   02/20/17 114/72   01/04/17 100/66   12/07/16 111/69     Juan, RN  Triage Nurse

## 2017-03-31 ENCOUNTER — E-VISIT (OUTPATIENT)
Dept: PEDIATRICS | Facility: CLINIC | Age: 52
End: 2017-03-31
Payer: COMMERCIAL

## 2017-03-31 DIAGNOSIS — F41.9 ANXIETY: ICD-10-CM

## 2017-03-31 DIAGNOSIS — F32.1 MAJOR DEPRESSIVE DISORDER, SINGLE EPISODE, MODERATE (H): Primary | ICD-10-CM

## 2017-03-31 DIAGNOSIS — Z11.59 NEED FOR HEPATITIS C SCREENING TEST: ICD-10-CM

## 2017-03-31 PROCEDURE — 99444 ZZC PHYSICIAN ONLINE EVALUATION & MANAGEMENT SERVICE: CPT | Performed by: INTERNAL MEDICINE

## 2017-03-31 NOTE — MR AVS SNAPSHOT
After Visit Summary   3/31/2017    Amita Finley    MRN: [id number]           Patient Information     Date Of Birth          1965        Visit Information        Provider Department      3/31/2017 8:48 AM Amanda Barros MD [facility] [place]        Today's Diagnoses     Major depressive disorder, single episode, moderate (H)    -  1    Anxiety        Need for hepatitis C screening test           Follow-ups after your visit        Who to contact     If you have questions or need follow up information about today's clinic visit or your schedule please contact [facility] [place] directly at [phone].  Normal or non-critical lab and imaging results will be communicated to you by MyChart, letter or phone within 4 business days after the clinic has received the results. If you do not hear from us within 7 days, please contact the clinic through ChiScant or phone. If you have a critical or abnormal lab result, we will notify you by phone as soon as possible.  Submit refill requests through DesignGooroo or call your pharmacy and they will forward the refill request to us. Please allow 3 business days for your refill to be completed.          Additional Information About Your Visit        MyChart Information     [employer] gives you secure access to your electronic health record. If you see a primary care provider, you can also send messages to your care team and make appointments. If you have questions, please call your primary care clinic.  If you do not have a primary care provider, please call [phone] and they will assist you.        Care EveryWhere ID     This is your Care EveryWhere ID. This could be used by other organizations to access your [facility] medical records  [id number]         Blood Pressure from Last 3 Encounters:   02/20/17 114/72   01/04/17 100/66   12/07/16 111/69    Weight from Last 3 Encounters:   02/20/17 215 lb 14.4 oz (97.9 kg)   12/07/16 207 lb 12.8 oz  (94.3 kg)   11/28/16 206 lb (93.4 kg)                 Today's Medication Changes          These changes are accurate as of: 3/31/17 11:59 PM.  If you have any questions, ask your nurse or doctor.               Start taking these medicines.        Dose/Directions    LORazepam 0.5 MG tablet   Commonly known as:  ATIVAN   Used for:  Anxiety        Dose:  0.25-0.5 mg   Take 0.5-1 tablets (0.25-0.5 mg) by mouth every 8 hours as needed for anxiety   Quantity:  20 tablet   Refills:  0            Where to get your medicines      These medications were sent to [facility] [place] - [facility] - [address]   [address], [place] [address]     Phone:  [phone]     sertraline 50 MG tablet         Some of these will need a paper prescription and others can be bought over the counter.  Ask your nurse if you have questions.     Bring a paper prescription for each of these medications     LORazepam 0.5 MG tablet                Primary Care Provider Office Phone # Fax #    Amanda Barros [phone]       [facility] [address] [place] [address]        Thank you!     Thank you for choosing [facility]  for your care. Our goal is always to provide you with excellent care. Hearing back from our patients is one way we can continue to improve our services. Please take a few minutes to complete the written survey that you may receive in the mail after your visit with us. Thank you!             Your Updated Medication List - Protect others around you: Learn how to safely use, store and throw away your medicines at www.disposemymeds.org.          This list is accurate as of: 3/31/17 11:59 PM.  Always use your most recent med list.                   Brand Name Dispense Instructions for use    diclofenac 1 % Gel topical gel    VOLTAREN    100 g    Apply 4 grams to ankle or 2 grams to hands four times daily using enclosed dosing card.        hydrochlorothiazide 25 MG tablet    HYDRODIURIL    90 tablet    Take 1 tablet (25 mg) by mouth daily       hydrOXYzine 25 MG tablet    ATARAX    60 tablet    Take 1-2 tablets (25-50 mg) by mouth every 6 hours as needed for anxiety       * levothyroxine 125 MCG tablet    SYNTHROID/LEVOTHROID    15 tablet    Take 1 pill every other day alternating with 137 mcg.       * levothyroxine 137 MCG tablet    SYNTHROID/LEVOTHROID    15 tablet    Take one every other day alternating with 125mcg dose       LORazepam 0.5 MG tablet    ATIVAN    20 tablet    Take 0.5-1 tablets (0.25-0.5 mg) by mouth every 8 hours as needed for anxiety       sertraline 50 MG tablet    ZOLOFT    90 tablet    Take 1 tablet (50 mg) by mouth daily       spironolactone 50 MG tablet    ALDACTONE    60 tablet    Take 1-2 tablets ( mg) by mouth daily       traZODone 50 MG tablet    DESYREL    180 tablet    Take 2 tablets (100 mg) by mouth nightly as needed for sleep       * Notice:  This list has 2 medication(s) that are the same as other medications prescribed for you. Read the directions carefully, and ask your doctor or other care provider to review them with you.

## 2017-03-31 NOTE — TELEPHONE ENCOUNTER
SUBJECTIVE:                                                    Amita Finley is a 51 year old female who presents for e-visit today for the following health issues:    52 y/o F with h/o anxiety and depression who was seen in clinic about a month ago and started on sertraline. Has had good improvement in both anxiety and depression symptoms with starting the sertraline, but had nausea with starting the medication and only has been able to tolerate 50 mg over the last couple of weeks. Also tried hydroxyzine to help with panic attacks, but made too tired.    Problem list, Medication list, Allergies, and Medical/Social/Surgical histories reviewed in [facility] and updated as appropriate.    OBJECTIVE:                                                      Diagnostic Test Results:  PHQ9: 18 -> 8     ASSESSMENT/PLAN:                                                      (F32.1) Major depressive disorder, single episode, moderate (H)  (primary encounter diagnosis)  (F41.9) Anxiety  Comment: improved, expect more improvement to come as only at 50 mg dose for about 2 weeks. Still with occasional panic attacks.  Plan: sertraline (ZOLOFT) 50 MG tablet  - continue sertraline 50 mg daily  - lorazepam as needed for severe panic attacks    (Z11.59) Need for hepatitis C screening test  Plan: Hepatitis C Screen Reflex to HCV RNA Quant and         Genotype  - will get drawn with repeat thyroid labs. Was supposed to be drawn with last labs but was not done.    Follow up with Provider - annual visit and as needed     [facility] [place]

## 2017-04-03 RX ORDER — LORAZEPAM 0.5 MG/1
.25-.5 TABLET ORAL EVERY 8 HOURS PRN
Qty: 20 TABLET | Refills: 0 | Status: SHIPPED | OUTPATIENT
Start: 2017-04-03 | End: 2018-02-09

## 2017-04-03 ASSESSMENT — PATIENT HEALTH QUESTIONNAIRE - PHQ9: SUM OF ALL RESPONSES TO PHQ QUESTIONS 1-9: 8

## 2017-05-12 DIAGNOSIS — E03.9 HYPOTHYROIDISM, UNSPECIFIED TYPE: ICD-10-CM

## 2017-05-12 DIAGNOSIS — Z11.59 NEED FOR HEPATITIS C SCREENING TEST: ICD-10-CM

## 2017-05-12 LAB
T4 FREE SERPL-MCNC: 1.18 NG/DL (ref 0.76–1.46)
TSH SERPL DL<=0.005 MIU/L-ACNC: 12.54 MU/L (ref 0.4–4)

## 2017-05-12 PROCEDURE — 86803 HEPATITIS C AB TEST: CPT | Performed by: INTERNAL MEDICINE

## 2017-05-12 PROCEDURE — 84443 ASSAY THYROID STIM HORMONE: CPT | Performed by: INTERNAL MEDICINE

## 2017-05-12 PROCEDURE — 84439 ASSAY OF FREE THYROXINE: CPT | Performed by: INTERNAL MEDICINE

## 2017-05-12 PROCEDURE — 36415 COLL VENOUS BLD VENIPUNCTURE: CPT | Performed by: INTERNAL MEDICINE

## 2017-05-12 RX ORDER — LEVOTHYROXINE SODIUM 137 UG/1
137 TABLET ORAL DAILY
Qty: 90 TABLET | Refills: 0 | Status: SHIPPED | OUTPATIENT
Start: 2017-05-12 | End: 2017-08-17

## 2017-05-14 LAB — HCV AB SERPL QL IA: NORMAL

## 2017-06-24 ENCOUNTER — HOSPITAL ENCOUNTER (EMERGENCY)
Facility: CLINIC | Age: 52
Discharge: HOME | End: 2017-06-25
Attending: FAMILY MEDICINE | Admitting: FAMILY MEDICINE
Payer: COMMERCIAL

## 2017-06-24 ENCOUNTER — APPOINTMENT (OUTPATIENT)
Dept: MRI IMAGING | Facility: CLINIC | Age: 52
End: 2017-06-24
Attending: FAMILY MEDICINE
Payer: COMMERCIAL

## 2017-06-24 ENCOUNTER — APPOINTMENT (OUTPATIENT)
Dept: CT IMAGING | Facility: CLINIC | Age: 52
End: 2017-06-24
Attending: FAMILY MEDICINE
Payer: COMMERCIAL

## 2017-06-24 ENCOUNTER — APPOINTMENT (OUTPATIENT)
Dept: GENERAL RADIOLOGY | Facility: CLINIC | Age: 52
End: 2017-06-24
Attending: FAMILY MEDICINE
Payer: COMMERCIAL

## 2017-06-24 DIAGNOSIS — R07.9 CHEST PAIN, UNSPECIFIED: ICD-10-CM

## 2017-06-24 DIAGNOSIS — G44.53 PRIMARY THUNDERCLAP HEADACHE: ICD-10-CM

## 2017-06-24 LAB
ALBUMIN SERPL-MCNC: 3.9 G/DL (ref 3.4–5)
ALP SERPL-CCNC: 59 U/L (ref 40–150)
ALT SERPL W P-5'-P-CCNC: 27 U/L (ref 0–50)
ANION GAP SERPL CALCULATED.3IONS-SCNC: 11 MMOL/L (ref 3–14)
AST SERPL W P-5'-P-CCNC: 20 U/L (ref 0–45)
BASOPHILS # BLD AUTO: 0 10E9/L (ref 0–0.2)
BASOPHILS NFR BLD AUTO: 0.3 %
BILIRUB SERPL-MCNC: 0.4 MG/DL (ref 0.2–1.3)
BUN SERPL-MCNC: 17 MG/DL (ref 7–30)
CALCIUM SERPL-MCNC: 8.7 MG/DL (ref 8.5–10.1)
CHLORIDE SERPL-SCNC: 105 MMOL/L (ref 94–109)
CO2 SERPL-SCNC: 26 MMOL/L (ref 20–32)
CREAT SERPL-MCNC: 1.04 MG/DL (ref 0.52–1.04)
DIFFERENTIAL METHOD BLD: ABNORMAL
EOSINOPHIL # BLD AUTO: 0.2 10E9/L (ref 0–0.7)
EOSINOPHIL NFR BLD AUTO: 2.5 %
ERYTHROCYTE [DISTWIDTH] IN BLOOD BY AUTOMATED COUNT: 15.3 % (ref 10–15)
GFR SERPL CREATININE-BSD FRML MDRD: 56 ML/MIN/1.7M2
GLUCOSE SERPL-MCNC: 97 MG/DL (ref 70–99)
HCT VFR BLD AUTO: 40 % (ref 35–47)
HGB BLD-MCNC: 13.2 G/DL (ref 11.7–15.7)
IMM GRANULOCYTES # BLD: 0 10E9/L (ref 0–0.4)
IMM GRANULOCYTES NFR BLD: 0.3 %
INR PPP: 0.96 (ref 0.86–1.14)
LYMPHOCYTES # BLD AUTO: 2.5 10E9/L (ref 0.8–5.3)
LYMPHOCYTES NFR BLD AUTO: 33.5 %
MCH RBC QN AUTO: 26.9 PG (ref 26.5–33)
MCHC RBC AUTO-ENTMCNC: 33 G/DL (ref 31.5–36.5)
MCV RBC AUTO: 82 FL (ref 78–100)
MONOCYTES # BLD AUTO: 0.4 10E9/L (ref 0–1.3)
MONOCYTES NFR BLD AUTO: 5.2 %
NEUTROPHILS # BLD AUTO: 4.4 10E9/L (ref 1.6–8.3)
NEUTROPHILS NFR BLD AUTO: 58.2 %
NRBC # BLD AUTO: 0 10*3/UL
NRBC BLD AUTO-RTO: 0 /100
PLATELET # BLD AUTO: 216 10E9/L (ref 150–450)
POTASSIUM SERPL-SCNC: 3.1 MMOL/L (ref 3.4–5.3)
PROT SERPL-MCNC: 7.2 G/DL (ref 6.8–8.8)
RBC # BLD AUTO: 4.91 10E12/L (ref 3.8–5.2)
SODIUM SERPL-SCNC: 142 MMOL/L (ref 133–144)
TROPONIN I BLD-MCNC: 0 UG/L (ref 0–0.1)
TROPONIN I SERPL-MCNC: NORMAL UG/L (ref 0–0.04)
WBC # BLD AUTO: 7.5 10E9/L (ref 4–11)

## 2017-06-24 PROCEDURE — 25000128 H RX IP 250 OP 636: Performed by: FAMILY MEDICINE

## 2017-06-24 PROCEDURE — 70544 MR ANGIOGRAPHY HEAD W/O DYE: CPT

## 2017-06-24 PROCEDURE — 96376 TX/PRO/DX INJ SAME DRUG ADON: CPT | Performed by: FAMILY MEDICINE

## 2017-06-24 PROCEDURE — 85025 COMPLETE CBC W/AUTO DIFF WBC: CPT | Performed by: FAMILY MEDICINE

## 2017-06-24 PROCEDURE — 80053 COMPREHEN METABOLIC PANEL: CPT | Performed by: FAMILY MEDICINE

## 2017-06-24 PROCEDURE — 84484 ASSAY OF TROPONIN QUANT: CPT | Performed by: FAMILY MEDICINE

## 2017-06-24 PROCEDURE — 93005 ELECTROCARDIOGRAM TRACING: CPT | Performed by: FAMILY MEDICINE

## 2017-06-24 PROCEDURE — 96375 TX/PRO/DX INJ NEW DRUG ADDON: CPT | Performed by: FAMILY MEDICINE

## 2017-06-24 PROCEDURE — 96374 THER/PROPH/DIAG INJ IV PUSH: CPT | Performed by: FAMILY MEDICINE

## 2017-06-24 PROCEDURE — 36415 COLL VENOUS BLD VENIPUNCTURE: CPT | Performed by: FAMILY MEDICINE

## 2017-06-24 PROCEDURE — 70450 CT HEAD/BRAIN W/O DYE: CPT

## 2017-06-24 PROCEDURE — 99285 EMERGENCY DEPT VISIT HI MDM: CPT | Mod: 25 | Performed by: FAMILY MEDICINE

## 2017-06-24 PROCEDURE — 70549 MR ANGIOGRAPH NECK W/O&W/DYE: CPT

## 2017-06-24 PROCEDURE — 62270 DX LMBR SPI PNXR: CPT | Performed by: FAMILY MEDICINE

## 2017-06-24 PROCEDURE — A9585 GADOBUTROL INJECTION: HCPCS | Performed by: FAMILY MEDICINE

## 2017-06-24 PROCEDURE — 70553 MRI BRAIN STEM W/O & W/DYE: CPT

## 2017-06-24 PROCEDURE — 84484 ASSAY OF TROPONIN QUANT: CPT

## 2017-06-24 PROCEDURE — 99285 EMERGENCY DEPT VISIT HI MDM: CPT | Mod: Z6 | Performed by: FAMILY MEDICINE

## 2017-06-24 PROCEDURE — 62270 DX LMBR SPI PNXR: CPT | Mod: Z6 | Performed by: FAMILY MEDICINE

## 2017-06-24 PROCEDURE — 85610 PROTHROMBIN TIME: CPT | Performed by: FAMILY MEDICINE

## 2017-06-24 PROCEDURE — 71010 XR CHEST PORT 1 VW: CPT

## 2017-06-24 PROCEDURE — 96361 HYDRATE IV INFUSION ADD-ON: CPT | Performed by: FAMILY MEDICINE

## 2017-06-24 RX ORDER — HYDROMORPHONE HCL/0.9% NACL/PF 0.2MG/0.2
0.2 SYRINGE (ML) INTRAVENOUS ONCE
Status: COMPLETED | OUTPATIENT
Start: 2017-06-24 | End: 2017-06-24

## 2017-06-24 RX ORDER — DIAZEPAM 10 MG/2ML
10 INJECTION, SOLUTION INTRAMUSCULAR; INTRAVENOUS ONCE
Status: COMPLETED | OUTPATIENT
Start: 2017-06-24 | End: 2017-06-24

## 2017-06-24 RX ORDER — ONDANSETRON 2 MG/ML
4 INJECTION INTRAMUSCULAR; INTRAVENOUS ONCE
Status: COMPLETED | OUTPATIENT
Start: 2017-06-24 | End: 2017-06-24

## 2017-06-24 RX ORDER — GADOBUTROL 604.72 MG/ML
10 INJECTION INTRAVENOUS ONCE
Status: COMPLETED | OUTPATIENT
Start: 2017-06-24 | End: 2017-06-24

## 2017-06-24 RX ADMIN — ONDANSETRON 4 MG: 2 INJECTION INTRAMUSCULAR; INTRAVENOUS at 19:00

## 2017-06-24 RX ADMIN — Medication 0.2 MG: at 19:01

## 2017-06-24 RX ADMIN — ONDANSETRON 4 MG: 2 INJECTION INTRAMUSCULAR; INTRAVENOUS at 19:25

## 2017-06-24 RX ADMIN — Medication 0.2 MG: at 20:18

## 2017-06-24 RX ADMIN — GADOBUTROL 10 ML: 604.72 INJECTION INTRAVENOUS at 23:35

## 2017-06-24 RX ADMIN — SODIUM CHLORIDE 30 ML: 9 INJECTION, SOLUTION INTRAVENOUS at 23:35

## 2017-06-24 RX ADMIN — DIAZEPAM 10 MG: 5 INJECTION, SOLUTION INTRAMUSCULAR; INTRAVENOUS at 23:13

## 2017-06-24 ASSESSMENT — ENCOUNTER SYMPTOMS
FEVER: 0
ABDOMINAL PAIN: 0
NAUSEA: 1
HEADACHES: 1
SHORTNESS OF BREATH: 0

## 2017-06-24 NOTE — ED PROVIDER NOTES
History     Chief Complaint   Patient presents with     Chest Pain     Headache     HPI  Amita Finley is a 51 year old female with a history of lower extremity edema on hydrochlorothiazide and obesity who presents to the Emergency Department for evaluation of a headache. This evening, the patient was at a restaurant when she suddenly developed severe headache/ neck ache with nausea. Shortly after, she developed left upper chest discomfort. The head was sudden and severe. No speech or vision changes. No mentation changes. No weakness or incoordination. She walked into the ed.    No fevers or chills. No cough or soa.    The chest pain began after the headache. No palpitations.     She has a distant history of migrane. She has no neck problems.    Past medical hx of depression and anxiety    Past Medical History:   Diagnosis Date     Antiphospholipid antibodies positive 12/14/2011     Baker's cyst of knee      Hypothyroidism      Obesity        Past Surgical History:   Procedure Laterality Date     Novasure endometrial ablation  10/2007       Family History   Problem Relation Age of Onset     GASTROINTESTINAL DISEASE Mother      in 60's     C.A.D. Father      DIABETES Father      Cardiovascular Father      First MI age 66     GASTROINTESTINAL DISEASE Sister      in 30's     CANCER No family hx of        Social History   Substance Use Topics     Smoking status: Former Smoker     Packs/day: 1.50     Years: 20.00     Types: Cigarettes     Quit date: 1/1/1996     Smokeless tobacco: Never Used     Alcohol use 1.8 oz/week     3 Standard drinks or equivalent per week   the pt is a smoker but no street drugs , occasionally alcohol    No current facility-administered medications for this encounter.      Current Outpatient Prescriptions   Medication     ibuprofen (ADVIL/MOTRIN) 800 MG tablet     prochlorperazine (COMPAZINE) 10 MG tablet     oxyCODONE-acetaminophen (PERCOCET) 5-325 MG per tablet     ondansetron (ZOFRAN)  4 MG tablet     levothyroxine (SYNTHROID/LEVOTHROID) 137 MCG tablet     sertraline (ZOLOFT) 50 MG tablet     spironolactone (ALDACTONE) 50 MG tablet     hydrochlorothiazide (HYDRODIURIL) 25 MG tablet     traZODone (DESYREL) 50 MG tablet     LORazepam (ATIVAN) 0.5 MG tablet     hydrOXYzine (ATARAX) 25 MG tablet     diclofenac (VOLTAREN) 1 % GEL        Allergies   Allergen Reactions     Ciprofloxacin Other (See Comments) and Anaphylaxis     Tongue swelling     Augmentin Other (See Comments)     Burning sensation on hands     I have reviewed the Medications, Allergies, Past Medical and Surgical History, and Social History in the Epic system.    Review of Systems   Constitutional: Negative for chills, fatigue and fever.   HENT: Negative for congestion, ear pain and sinus pressure.    Eyes: Negative for visual disturbance.   Respiratory: Negative for cough, shortness of breath and wheezing.    Cardiovascular: Positive for chest pain. Negative for palpitations and leg swelling.   Gastrointestinal: Positive for nausea. Negative for abdominal pain, constipation, diarrhea and vomiting.   Genitourinary: Negative for dysuria.        Menopausal   Musculoskeletal: Negative for myalgias.   Allergic/Immunologic: Negative for immunocompromised state.   Neurological: Positive for headaches. Negative for dizziness, tremors, seizures, syncope, facial asymmetry, speech difficulty, weakness, light-headedness and numbness.   All other systems reviewed and are negative.      Physical Exam   BP: 132/87  Pulse: 77  Temp: 97.6  F (36.4  C)  Resp: 18  SpO2: 97 %  Physical Exam   Constitutional: She is oriented to person, place, and time. She appears well-developed and well-nourished. She appears distressed.   HENT:   Head: Normocephalic and atraumatic.   Mouth/Throat: No oropharyngeal exudate.   Eyes: Conjunctivae and EOM are normal. Pupils are equal, round, and reactive to light. No scleral icterus.   Neck: Normal range of motion. Neck  supple. No thyromegaly present.   No meningeal signs   Cardiovascular: Normal rate, regular rhythm, normal heart sounds and intact distal pulses.    No murmur heard.  Pulmonary/Chest: Effort normal and breath sounds normal.   Abdominal: Soft. There is no tenderness.   Musculoskeletal: She exhibits no edema.   Lymphadenopathy:     She has no cervical adenopathy.   Neurological: She is alert and oriented to person, place, and time. She has normal reflexes. No cranial nerve deficit. She exhibits normal muscle tone. Coordination normal.   Skin: Skin is warm and dry.   Nursing note and vitals reviewed.      ED Course     ED Course     Procedures        EKG done. NSR rate of 71. All intervals are normal and no prolongation of QT    When the pt presented I was very concerned about an acute brain bleed given the suddenness of the headache and the severity.  She got zofran and 0.2 mg of dilaudid IV. There was a significant improvement.   Immediate head ct showed no blood or any abnormality.  The chest pain disappeared within 15 minutes of arrival in the ed. EKG as above and troponin normal. There is no evidence for acute coronary syndrome. All labs are normal including trop.    She was greatly better on return from ct.    At this point we talked about acute brain bleed- SAH- I told her that there are a small percentage of bleeds that do not show on ct. 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 She is knowledgable with bleeds. She consented to a lumbar puncture.  PROCEDURE: LP  THE PT WAS PREPPED WITH BETADAINE. A STERILE FIELD MADE. SHE WAS ON HER SIDE IN VERY GOOD POSITION. SEVERAL ATTEMPTS AT 2 INTERSPACES DID NOT YIELD FLUID. THERE WERE SEVERAL TIMES THAT I THOUGHT I HAD THE FLUID BUT NO SUCCESS.  AT THIS POINT ANOTHER TRAY USED AND THE PT WAS SITTING FORWARD OVER A GARCIA STAND. AGAIN SEVERAL ATTEMPTS AT 2 LEVELS WITHOUT SUCCESS. AGAIN I THOUGHT I WAS IN SEVERAL TIMES BUT NO SUCCESS.    After failing, I  called neuro attending at the [facility]. He suggested MRI/MRA looking for blood with a stroke protocol. This is likely not as sensitive as LP. This was discussed with the pt- trying again vs MRI. She opted for mri  She got 10 mg of valium IV immediately before procedure for claustophobia.  MRIs show no blood, no vessel issues- all normal.  The pt was doing well with just a hint of headache. She wanted nothing for the pain.   The pt will be discharged to home.  present throughout    At time of dc, the pt is awake and alert. No or just minimal post headache.  Ambulatory  Labs Ordered and Resulted from Time of ED Arrival Up to the Time of Departure from the ED   CBC WITH PLATELETS DIFFERENTIAL - Abnormal; Notable for the following:        Result Value    RDW 15.3 (*)     All other components within normal limits   COMPREHENSIVE METABOLIC PANEL - Abnormal; Notable for the following:     Potassium 3.1 (*)     GFR Estimate 56 (*)     All other components within normal limits   INR   TROPONIN I   TROPONIN POCT   GLUCOSE CSF   PROTEIN TOTAL CSF   CELL COUNT WITH DIFFERENTIAL CSF   GRAM STAIN   CSF CULTURE AEROBIC BACTERIAL            Assessments & Plan (with Medical Decision Making)   Sudden onset of headache- severe with no evidence for bleed.    I have reviewed the nursing notes.    I have reviewed the findings, diagnosis, plan and need for follow up with the patient.    Discharge Medication List as of 6/25/2017  1:31 AM      START taking these medications    Details   prochlorperazine (COMPAZINE) 10 MG tablet Take 1 tablet (10 mg) by mouth every 6 hours as needed for nausea or vomiting, Disp-6 tablet, R-0, Local Print      oxyCODONE-acetaminophen (PERCOCET) 5-325 MG per tablet Take 1-2 tablets by mouth every 6 hours as needed for moderate to severe pain, Disp-12 tablet, R-0, Local Print      ondansetron (ZOFRAN) 4 MG tablet Take 1 tablet (4 mg) by mouth every 6 hours as needed for nausea, Disp-6 tablet, R-0, Local  Print             Final diagnoses:   Primary thunderclap headache- no evidence for acute bleed       6/24/2017   [facility], [place], EMERGENCY DEPARTMENT     Thom Rider MD  06/25/17 0217

## 2017-06-24 NOTE — ED AVS SNAPSHOT
[facility], Emergency Department    [address]    [facility] [address]    Phone:  [phone]    Fax:  [phone]                                       Amita Finley   MRN: [id number]    Department:  [facility], Emergency Department   Date of Visit:  6/24/2017           After Visit Summary Signature Page     I have received my discharge instructions, and my questions have been answered. I have discussed any challenges I see with this plan with the nurse or doctor.    ..........................................................................................................................................  Patient/Patient Representative Signature      ..........................................................................................................................................  Patient Representative Print Name and Relationship to Patient    ..................................................               ................................................  Date                                            Time    ..........................................................................................................................................  Reviewed by Signature/Title    ...................................................              ..............................................  Date                                                            Time

## 2017-06-24 NOTE — ED NOTES
Left chest pain with pain in left shoulder constant and intermittent left jaw pain. Severe headache with nausea.

## 2017-06-24 NOTE — ED AVS SNAPSHOT
[facility], Emergency Department    [address]    [place] [address]    Phone:  [phone]    Fax:  [phone]                                       Amita Finley   MRN: [id number]    Department:  [facility], Emergency Department   Date of Visit:  6/24/2017           Patient Information     Date Of Birth          1965        Your diagnoses for this visit were:     Primary thunderclap headache- no evidence for acute bleed        You were seen by Thom Rider MD.        Discharge Instructions       Please see your primary provider early this upcoming week.  Try ice to the head- if not helping try warm.  Return immediately for return of the severe headache.  Home to rest.  For any nausea, zofran or compazine- compazine may do double duty for the nausea and help the headache.  It will also help relax you.  For the pain motrin- 800 mg every 6 hours.  If needed, percocet- this is a narcotic- it can make you more nauseated  At this time there is no evidence for bleeding.  At this time there is no evidence for infection  All your blood work is normal.  Your ct scan is normal  I could not get a lumbar puncture so MRIs done. All normal.     24 Hour Appointment Hotline       To make an appointment at any [facility] clinic, call [phone] ([phone]). If you don't have a family doctor or clinic, we will help you find one. [facility] clinics are conveniently located to serve the needs of you and your family.             Review of your medicines      START taking        Dose / Directions Last dose taken    ondansetron 4 MG tablet   Commonly known as:  ZOFRAN   Dose:  4 mg   Quantity:  6 tablet        Take 1 tablet (4 mg) by mouth every 6 hours as needed for nausea   Refills:  0        oxyCODONE-acetaminophen 5-325 MG per tablet   Commonly known as:  PERCOCET   Dose:  1-2 tablet   Quantity:  12 tablet        Take 1-2 tablets by mouth every 6 hours as needed for moderate to severe pain    Refills:  0        prochlorperazine 10 MG tablet   Commonly known as:  COMPAZINE   Dose:  10 mg   Quantity:  6 tablet        Take 1 tablet (10 mg) by mouth every 6 hours as needed for nausea or vomiting   Refills:  0          CONTINUE these medicines which may have CHANGED, or have new prescriptions. If we are uncertain of the size of tablets/capsules you have at home, strength may be listed as something that might have changed.        Dose / Directions Last dose taken    ibuprofen 800 MG tablet   Commonly known as:  ADVIL/MOTRIN   Dose:  800 mg   What changed:    - medication strength  - how much to take  - when to take this  - reasons to take this   Quantity:  20 tablet        Take 1 tablet (800 mg) by mouth every 6 hours as needed for moderate pain   Refills:  0          Our records show that you are taking the medicines listed below. If these are incorrect, please call your family doctor or clinic.        Dose / Directions Last dose taken    diclofenac 1 % Gel topical gel   Commonly known as:  VOLTAREN   Quantity:  100 g        Apply 4 grams to ankle or 2 grams to hands four times daily using enclosed dosing card.   Refills:  1        hydrochlorothiazide 25 MG tablet   Commonly known as:  HYDRODIURIL   Dose:  25 mg   Quantity:  90 tablet        Take 1 tablet (25 mg) by mouth daily   Refills:  2        hydrOXYzine 25 MG tablet   Commonly known as:  ATARAX   Dose:  25-50 mg   Quantity:  60 tablet        Take 1-2 tablets (25-50 mg) by mouth every 6 hours as needed for anxiety   Refills:  1        levothyroxine 137 MCG tablet   Commonly known as:  SYNTHROID/LEVOTHROID   Dose:  137 mcg   Quantity:  90 tablet        Take 1 tablet (137 mcg) by mouth daily   Refills:  0        LORazepam 0.5 MG tablet   Commonly known as:  ATIVAN   Dose:  0.25-0.5 mg   Quantity:  20 tablet        Take 0.5-1 tablets (0.25-0.5 mg) by mouth every 8 hours as needed for anxiety   Refills:  0        sertraline 50 MG tablet   Commonly known  as:  ZOLOFT   Dose:  50 mg   Quantity:  90 tablet        Take 1 tablet (50 mg) by mouth daily   Refills:  1        spironolactone 50 MG tablet   Commonly known as:  ALDACTONE   Dose:   mg   Quantity:  60 tablet        Take 1-2 tablets ( mg) by mouth daily   Refills:  5        traZODone 50 MG tablet   Commonly known as:  DESYREL   Dose:  100 mg   Quantity:  180 tablet        Take 2 tablets (100 mg) by mouth nightly as needed for sleep   Refills:  1                Prescriptions were sent or printed at these locations (4 Prescriptions)                   Other Prescriptions                Printed at Department/Unit printer (4 of 4)         ibuprofen (ADVIL/MOTRIN) 800 MG tablet               prochlorperazine (COMPAZINE) 10 MG tablet               oxyCODONE-acetaminophen (PERCOCET) 5-325 MG per tablet               ondansetron (ZOFRAN) 4 MG tablet                Procedures and tests performed during your visit     CBC with platelets differential    Chest  XR, 1 view portable    Comprehensive metabolic panel    EKG 12 lead    Head CT w/o contrast    INR    MR Brain w/o & w Contrast    MRA Head w/o Contrast Angiogram    MRA Neck w/o & w Contrast Angiogram    Troponin I    Troponin POCT      Orders Needing Specimen Collection     Ordered          06/24/17 2015  Glucose CSF: Tube 1 - ROUTINE, Prio: Routine, Needs to be Collected     Scheduled Task Status   06/24/17 2015 Collect Glucose CSF: Tube 1 Open   Order Class:  PCU Collect                06/24/17 2015  Protein total CSF: Tube 1 - ROUTINE, Prio: Routine, Needs to be Collected     Scheduled Task Status   06/24/17 2015 Collect Protein total CSF: Tube 1 Open   Order Class:  PCU Collect                06/24/17 2015  Cell count with differential CSF: Tube 4 - ROUTINE, Prio: Routine, Needs to be Collected     Scheduled Task Status   06/24/17 2015 Collect Cell count with differential CSF: Tube 4 Open   Order Class:  PCU Collect                06/24/17 2015  Gram  stain CSF Tube 2 - ROUTINE, Prio: Routine, Needs to be Collected     Scheduled Task Status   06/24/17 2015 Collect Gram stain CSF Tube 2 Open   Order Class:  PCU Collect                06/24/17 2015  CSF Culture Aerobic Bacterial Tube 2 - ROUTINE, Prio: Routine, Needs to be Collected     Scheduled Task Status   06/24/17 2015 Collect CSF Culture Aerobic Bacterial Tube 2 Open   Order Class:  PCU Collect                  Pending Results     Date and Time Order Name Status Description    6/24/2017 2206 MRA Neck w/o & w Contrast Angiogram In process     6/24/2017 2206 MRA Head w/o Contrast Angiogram In process     6/24/2017 2206 MR Brain w/o & w Contrast In process     6/24/2017 1843 EKG 12 lead Preliminary             Pending Culture Results     No orders found for last 3 day(s).            Pending Results Instructions     If you had any lab results that were not finalized at the time of your Discharge, you can call the ED Lab Result RN at [phone]. You will be contacted by this team for any positive Lab results or changes in treatment. The nurses are available 7 days a week from 10A to 6:30P.  You can leave a message 24 hours per day and they will return your call.        Thank you for choosing [facility]       Thank you for choosing [facility] for your care. Our goal is always to provide you with excellent care. Hearing back from our patients is one way we can continue to improve our services. Please take a few minutes to complete the written survey that you may receive in the mail after you visit with us. Thank you!        [employer] Information     [employer] gives you secure access to your electronic health record. If you see a primary care provider, you can also send messages to your care team and make appointments. If you have questions, please call your primary care clinic.  If you do not have a primary care provider, please call [phone] and they will assist you.        Care EveryWhere ID     This is your Care  EveryWhere ID. This could be used by other organizations to access your [facility] medical records  [id number]        Equal Access to Services     CASI SANTA : Joselito Jenkins, odette méndez, malgorzata phelps, norman webb. So [facility] [phone].    ATENCIÓN: Si habla español, tiene a lopez disposición servicios gratuitos de asistencia lingüística. Llame al [phone].    We comply with applicable federal civil rights laws and [place] laws. We do not discriminate on the basis of race, color, national origin, age, disability sex, sexual orientation or gender identity.            After Visit Summary       This is your record. Keep this with you and show to your community pharmacist(s) and doctor(s) at your next visit.

## 2017-06-25 VITALS
HEART RATE: 77 BPM | SYSTOLIC BLOOD PRESSURE: 119 MMHG | RESPIRATION RATE: 21 BRPM | HEIGHT: 66 IN | DIASTOLIC BLOOD PRESSURE: 71 MMHG | OXYGEN SATURATION: 95 % | BODY MASS INDEX: 36.96 KG/M2 | WEIGHT: 230 LBS | TEMPERATURE: 97.6 F

## 2017-06-25 PROCEDURE — 25000128 H RX IP 250 OP 636: Performed by: FAMILY MEDICINE

## 2017-06-25 PROCEDURE — 96361 HYDRATE IV INFUSION ADD-ON: CPT | Performed by: FAMILY MEDICINE

## 2017-06-25 RX ORDER — ONDANSETRON 4 MG/1
4 TABLET, FILM COATED ORAL EVERY 6 HOURS PRN
Qty: 6 TABLET | Refills: 0 | Status: SHIPPED | OUTPATIENT
Start: 2017-06-25 | End: 2018-02-09

## 2017-06-25 RX ORDER — SODIUM CHLORIDE 9 MG/ML
INJECTION, SOLUTION INTRAVENOUS ONCE
Status: COMPLETED | OUTPATIENT
Start: 2017-06-25 | End: 2017-06-25

## 2017-06-25 RX ORDER — OXYCODONE AND ACETAMINOPHEN 5; 325 MG/1; MG/1
1-2 TABLET ORAL EVERY 6 HOURS PRN
Qty: 12 TABLET | Refills: 0 | Status: SHIPPED | OUTPATIENT
Start: 2017-06-25 | End: 2018-02-09

## 2017-06-25 RX ORDER — IBUPROFEN 800 MG/1
800 TABLET, FILM COATED ORAL EVERY 6 HOURS PRN
Qty: 20 TABLET | Refills: 0 | Status: SHIPPED | OUTPATIENT
Start: 2017-06-25 | End: 2019-12-06

## 2017-06-25 RX ORDER — PROCHLORPERAZINE MALEATE 10 MG
10 TABLET ORAL EVERY 6 HOURS PRN
Qty: 6 TABLET | Refills: 0 | Status: SHIPPED | OUTPATIENT
Start: 2017-06-25 | End: 2018-02-09

## 2017-06-25 RX ADMIN — SODIUM CHLORIDE: 9 INJECTION, SOLUTION INTRAVENOUS at 00:39

## 2017-06-25 ASSESSMENT — ENCOUNTER SYMPTOMS
DIARRHEA: 0
FATIGUE: 0
SINUS PRESSURE: 0
FACIAL ASYMMETRY: 0
CONSTIPATION: 0
TREMORS: 0
CHILLS: 0
VOMITING: 0
PALPITATIONS: 0
MYALGIAS: 0
DYSURIA: 0
DIZZINESS: 0
WEAKNESS: 0
LIGHT-HEADEDNESS: 0
COUGH: 0
WHEEZING: 0
NUMBNESS: 0
SEIZURES: 0
SPEECH DIFFICULTY: 0

## 2017-06-25 NOTE — ED NOTES
Pt came into ED c/o sudden onset of a severe headache and nausea 30 min prior to arrival . She currently states that the headache is much improved and the nausea is decreased

## 2017-06-25 NOTE — DISCHARGE INSTRUCTIONS
Please see your primary provider early this upcoming week.  Try ice to the head- if not helping try warm.  Return immediately for return of the severe headache.  Home to rest.  For any nausea, zofran or compazine- compazine may do double duty for the nausea and help the headache.  It will also help relax you.  For the pain motrin- 800 mg every 6 hours.  If needed, percocet- this is a narcotic- it can make you more nauseated  At this time there is no evidence for bleeding.  At this time there is no evidence for infection  All your blood work is normal.  Your ct scan is normal  I could not get a lumbar puncture so MRIs done. All normal.

## 2017-06-26 LAB — INTERPRETATION ECG - MUSE: NORMAL

## 2017-08-05 ENCOUNTER — HEALTH MAINTENANCE LETTER (OUTPATIENT)
Age: 52
End: 2017-08-05

## 2017-09-13 ENCOUNTER — TELEPHONE (OUTPATIENT)
Dept: PEDIATRICS | Facility: CLINIC | Age: 52
End: 2017-09-13

## 2017-09-13 DIAGNOSIS — R60.0 EDEMA OF BOTH LEGS: ICD-10-CM

## 2017-09-13 NOTE — TELEPHONE ENCOUNTER
spironolactone (ALDACTONE) 50 MG tablet       Last Written Prescription Date: 3/3/2017  Last Fill Quantity: 60, # refills: 5    Last Office Visit with [facility], [facility] or [facility] prescribing provider:  3/31/2017   Future Office Visit:       Date of Last Asthma Action Plan Letter:   There are no preventive care reminders to display for this patient.   Asthma Control Test: No flowsheet data found.    Date of Last Spirometry Test:   No results found for this or any previous visit.

## 2017-09-15 RX ORDER — SPIRONOLACTONE 50 MG/1
TABLET, FILM COATED ORAL
Qty: 60 TABLET | Refills: 1 | Status: SHIPPED | OUTPATIENT
Start: 2017-09-15 | End: 2017-11-10

## 2017-09-15 NOTE — TELEPHONE ENCOUNTER
rx sent. Due for annual visit after 10/10/17. Thanks,  Amanda Barros MD  Internal Medicine/Pediatrics

## 2017-09-15 NOTE — TELEPHONE ENCOUNTER
Routing to Dr. Barros to advise on if refill x 1 is appropriate.     Patient due for PAP, could recheck BMP at that time as well. Routing to [facility] Nurse [facility], please call to assist patient in scheduling a PAP physical with non-fasting labs.        Potassium   Date Value Ref Range Status   06/24/2017 3.1 (L) 3.4 - 5.3 mmol/L Final     Creatinine   Date Value Ref Range Status   06/24/2017 1.04 0.52 - 1.04 mg/dL Final     BP Readings from Last 3 Encounters:   06/25/17 119/71   02/20/17 114/72   01/04/17 100/66

## 2017-10-06 DIAGNOSIS — E03.9 HYPOTHYROIDISM, UNSPECIFIED TYPE: ICD-10-CM

## 2017-10-06 RX ORDER — LEVOTHYROXINE SODIUM 137 UG/1
TABLET ORAL
Qty: 30 TABLET | Refills: 0 | Status: SHIPPED | OUTPATIENT
Start: 2017-10-06 | End: 2017-11-10

## 2017-10-06 NOTE — TELEPHONE ENCOUNTER
Overdue for for labs. rx sent for 30 days. Please have patient schedule lab visit     Thanks,  Amanda Barros MD  Internal Medicine/Pediatrics

## 2017-10-06 NOTE — TELEPHONE ENCOUNTER
levothyroxine (SYNTHROID/LEVOTHROID) 137 MCG     Last Written Prescription Date: 8/21/17  Last Quantity: 30, # refills: 0  Last Office Visit with [facility], [facility] or [facility] prescribing provider: 2/20/17        TSH   Date Value Ref Range Status   05/12/2017 12.54 (H) 0.40 - 4.00 mU/L Final

## 2017-10-06 NOTE — TELEPHONE ENCOUNTER
Routing refill request to provider for review/approval because:  Labs out of range:  TSH Elevated. Please advise when you would like patient to complete future TSH lab orders?    Ro RUSSELL RN, BSN, PHN  Desi Oconnor RN

## 2017-10-16 DIAGNOSIS — F51.01 PRIMARY INSOMNIA: Primary | ICD-10-CM

## 2017-10-17 NOTE — TELEPHONE ENCOUNTER
traZODone (DESYREL) 50 MG tablet       Last Written Prescription Date: 12/28/2016  Last Fill Quantity: 180; # refills: 1  Last Office Visit with [facility], [facility] or [facility] prescribing provider:  2/20/2017        Last PHQ-9 score on record=   PHQ-9 SCORE 4/2/2017   Total Score 8       Lab Results   Component Value Date    AST 20 06/24/2017     Lab Results   Component Value Date    ALT 27 06/24/2017

## 2017-10-18 RX ORDER — TRAZODONE HYDROCHLORIDE 50 MG/1
100 TABLET, FILM COATED ORAL
Qty: 180 TABLET | Refills: 3 | Status: SHIPPED | OUTPATIENT
Start: 2017-10-18 | End: 2018-02-09

## 2017-10-18 NOTE — TELEPHONE ENCOUNTER
Routing refill request to provider for review/approval because:  phq9 > 4 and depression is on the problem list     E-visit on 3/31/17

## 2017-11-10 DIAGNOSIS — E03.9 HYPOTHYROIDISM, UNSPECIFIED TYPE: ICD-10-CM

## 2017-11-10 DIAGNOSIS — R60.0 EDEMA OF BOTH LEGS: ICD-10-CM

## 2017-11-14 RX ORDER — SPIRONOLACTONE 50 MG/1
TABLET, FILM COATED ORAL
Qty: 60 TABLET | Refills: 3 | Status: SHIPPED | OUTPATIENT
Start: 2017-11-14 | End: 2018-02-09

## 2017-11-14 RX ORDER — HYDROCHLOROTHIAZIDE 25 MG/1
25 TABLET ORAL DAILY
Qty: 90 TABLET | Refills: 0 | Status: SHIPPED | OUTPATIENT
Start: 2017-11-14 | End: 2018-02-09

## 2017-11-14 RX ORDER — LEVOTHYROXINE SODIUM 137 UG/1
TABLET ORAL
Qty: 30 TABLET | Refills: 0 | Status: SHIPPED | OUTPATIENT
Start: 2017-11-14 | End: 2017-12-19

## 2017-11-14 NOTE — TELEPHONE ENCOUNTER
LM on  to call back and schedule.     Levothyroxine.     Medication is being filled for 1 time refill only due to:  Future labs ordered TSH needs to be rechecked per 5/2017 lab result note.     Ro RUSSELL RN, BSN, PHN  [place] [facility] RN

## 2017-11-14 NOTE — TELEPHONE ENCOUNTER
Spironolactone      Last Written Prescription Date: 9/15/2017  Last Fill Quantity: 60, # refills: 1  Last Office Visit with [facility], [facility] or [facility] prescribing provider: 2/20/2017       Potassium   Date Value Ref Range Status   06/24/2017 3.1 (L) 3.4 - 5.3 mmol/L Final     Creatinine   Date Value Ref Range Status   06/24/2017 1.04 0.52 - 1.04 mg/dL Final     BP Readings from Last 3 Encounters:   06/25/17 119/71   02/20/17 114/72   01/04/17 100/66     HCTZ      Last Written Prescription Date: 1/5/2017  Last Fill Quantity: 90, # refills: 2  Last Office Visit with [facility], [facility] or [facility] prescribing provider: 2/20/2017       Potassium   Date Value Ref Range Status   06/24/2017 3.1 (L) 3.4 - 5.3 mmol/L Final     Creatinine   Date Value Ref Range Status   06/24/2017 1.04 0.52 - 1.04 mg/dL Final     BP Readings from Last 3 Encounters:   06/25/17 119/71   02/20/17 114/72   01/04/17 100/66       Routing refill request to provider for review/approval because:  Christine given x1 and patient did not follow up, please advise  Labs out of range:  Potassium  Patient needs to be seen because:  Due for an annual physical with labs    Ro RUSSELL RN, BSN, PHN  [place] [facility] RN

## 2017-12-15 DIAGNOSIS — F41.9 ANXIETY: ICD-10-CM

## 2017-12-15 DIAGNOSIS — F32.1 MAJOR DEPRESSIVE DISORDER, SINGLE EPISODE, MODERATE (H): ICD-10-CM

## 2017-12-19 ENCOUNTER — MYC MEDICAL ADVICE (OUTPATIENT)
Dept: PEDIATRICS | Facility: CLINIC | Age: 52
End: 2017-12-19

## 2017-12-19 DIAGNOSIS — E03.9 HYPOTHYROIDISM, UNSPECIFIED TYPE: ICD-10-CM

## 2017-12-19 RX ORDER — LEVOTHYROXINE SODIUM 137 UG/1
TABLET ORAL
Qty: 30 TABLET | Refills: 0 | Status: SHIPPED | OUTPATIENT
Start: 2017-12-19 | End: 2018-01-29

## 2017-12-19 ASSESSMENT — PATIENT HEALTH QUESTIONNAIRE - PHQ9
SUM OF ALL RESPONSES TO PHQ QUESTIONS 1-9: 7
10. IF YOU CHECKED OFF ANY PROBLEMS, HOW DIFFICULT HAVE THESE PROBLEMS MADE IT FOR YOU TO DO YOUR WORK, TAKE CARE OF THINGS AT HOME, OR GET ALONG WITH OTHER PEOPLE: NOT DIFFICULT AT ALL
SUM OF ALL RESPONSES TO PHQ QUESTIONS 1-9: 7

## 2017-12-19 NOTE — TELEPHONE ENCOUNTER
Requested Prescriptions   Pending Prescriptions Disp Refills     sertraline (ZOLOFT) 50 MG tablet [Pharmacy Med Name: SERTRALINE HCL 50MG TABS] 90 tablet 1     Sig: TAKE ONE TABLET BY MOUTH EVERY DAY    SSRIs Protocol Failed    12/16/2017  7:28 PM       Failed - PHQ-9 score less than 5 in past 6 months    Please review PHQ-9 score.          Failed - Recent (6 mo) or future visit with authorizing provider's specialty    Patient had office visit in the last 6 months or has a visit in the next 30 days with authorizing provider.  See chart review.            Passed - Medication is NOT Bupropion    If the medication is Bupropion (Wellbutrin), and the patient is taking for smoking cessation; OK to refill.         Passed - Patient is age 18 or older       Passed - No active pregnancy on record       Passed - No positive pregnancy test in last 12 months        PHQ-9 score:    PHQ-9 SCORE 4/2/2017   Total Score 8     Medication is being filled for 1 time refill only due to:  phq9 sent to pt thru [facility].    Lia Belcher RN

## 2017-12-19 NOTE — TELEPHONE ENCOUNTER
Medication is being filled for 1 time refill only due to:  Patient needs labs needs recheck on TSH with dosage change. Call to schedule lab only appointment.     Elissa Srinivasan RN

## 2017-12-19 NOTE — TELEPHONE ENCOUNTER
Requested Prescriptions   Pending Prescriptions Disp Refills     levothyroxine (SYNTHROID/LEVOTHROID) 137 MCG tablet [Pharmacy Med Name: LEVOTHYROXINE 0.137MG (137MCG) TAB]  Last Written Prescription Date:  11/14/2017  Last Fill Quantity: 30,  # refills: 0   Last Office Visit with [facility], P or [facility] prescribing provider:  2/20/2017   Future Office Visit:      30 tablet 0     Sig: TAKE 1 TABLET BY MOUTH EVERY DAY.    Thyroid Protocol Failed    12/19/2017  6:32 AM       Failed - Normal TSH on file in past 12 months    Recent Labs   Lab Test  05/12/17   0915   TSH  12.54*             Passed - Patient is 12 years or older       Passed - Recent or future visit with authorizing provider's specialty    Patient had office visit in the last year or has a visit in the next 30 days with authorizing provider.  See chart review.              Passed - No active pregnancy on record    If patient is pregnant or has had a positive pregnancy test, please check TSH.         Passed - No positive pregnancy test in past 12 months    If patient is pregnant or has had a positive pregnancy test, please check TSH.

## 2017-12-20 ASSESSMENT — PATIENT HEALTH QUESTIONNAIRE - PHQ9: SUM OF ALL RESPONSES TO PHQ QUESTIONS 1-9: 7

## 2018-01-17 DIAGNOSIS — E03.9 HYPOTHYROIDISM, UNSPECIFIED TYPE: ICD-10-CM

## 2018-01-17 RX ORDER — LEVOTHYROXINE SODIUM 137 UG/1
TABLET ORAL
Refills: 0 | Status: CANCELLED | OUTPATIENT
Start: 2018-01-17

## 2018-01-17 NOTE — TELEPHONE ENCOUNTER
"Requested Prescriptions   Pending Prescriptions Disp Refills     levothyroxine (SYNTHROID/LEVOTHROID) 137 MCG tablet [Pharmacy Med Name: LEVOTHYROXINE 0.137MG (137MCG) TAB]    Last Written Prescription Date:  12/19/2017  Last Fill Quantity: 30,  # refills: 0   Last Office Visit with G, P or [facility] prescribing provider:  2/20/2017   Future Office Visit:      30 tablet 0     Sig: TAKE 1 TABLET BY MOUTH EVERY DAY.    Thyroid Protocol Failed    1/17/2018  6:38 AM       Failed - Normal TSH on file in past 12 months    Recent Labs   Lab Test  05/12/17   0915   TSH  12.54*             Passed - Patient is 12 years or older       Passed - Recent or future visit with authorizing provider's specialty    Patient had office visit in the last year or has a visit in the next 30 days with authorizing provider.  See \"Patient Info\" tab in inbasket, or \"Choose Columns\" in Meds & Orders section of the refill encounter.              Passed - No active pregnancy on record    If patient is pregnant or has had a positive pregnancy test, please check TSH.         Passed - No positive pregnancy test in past 12 months    If patient is pregnant or has had a positive pregnancy test, please check TSH.            "

## 2018-01-17 NOTE — TELEPHONE ENCOUNTER
I cannot continue to fill this without lab work. Was due last summer. Has been told since August that she needs to come in. Also due for office visit in Feb. If she would like to come in earlier and do them both at once, that is fine. If schedules, I can send a script to get through to appointment. Please let know.    Amanda Barros MD  Internal Medicine/Pediatrics

## 2018-01-17 NOTE — LETTER
[facility]  [address]  [place] [address]  [phone]      January 24, 2018      Amita Finley  [address] [address]              Dear Amita Finley    This is to remind you that your  TSH is due. You may call our office at [phone] to schedule a lab only appointment.  Dr Barros will not fill this until you have your levels checked. You are due for an appointment in February for general follow up and/or physical exam. If you want to wait to have your thyroid labs until then she will fill it with enough to get you through until your appointment date.    Please disregard this notice if you have had this lab work done or already made an appointment.     Note: If you are scheduled for a cholesterol or any diabetic lab tests, you will need to fast for 12 hours prior to your lab appointment.        Sincerely,    MD Niya Rogers LPN

## 2018-01-17 NOTE — TELEPHONE ENCOUNTER
Routing refill request to provider for review/approval because:  Christine given x1 and patient did not follow up, please advise  Labs out of range:    TSH   Date Value Ref Range Status   05/12/2017 12.54 (H) 0.40 - 4.00 mU/L Final   ]    Elissa Srinivasan RN

## 2018-01-20 ENCOUNTER — TELEPHONE (OUTPATIENT)
Dept: PEDIATRICS | Facility: CLINIC | Age: 53
End: 2018-01-20

## 2018-01-20 NOTE — TELEPHONE ENCOUNTER
"1/20/2018      Patient Communication Preferences indicate  Do not contact  and/or communication by \"Phone\" is not preferred. Call not required per Outreach team.          Outreach ,  Celina Michaels    "

## 2018-01-29 ENCOUNTER — MYC MEDICAL ADVICE (OUTPATIENT)
Dept: PEDIATRICS | Facility: CLINIC | Age: 53
End: 2018-01-29

## 2018-01-29 DIAGNOSIS — E03.9 HYPOTHYROIDISM, UNSPECIFIED TYPE: ICD-10-CM

## 2018-01-29 RX ORDER — LEVOTHYROXINE SODIUM 137 UG/1
TABLET ORAL
Qty: 12 TABLET | Refills: 0 | Status: SHIPPED | OUTPATIENT
Start: 2018-01-29 | End: 2018-02-10

## 2018-01-29 NOTE — TELEPHONE ENCOUNTER
Per refill encounter-  Dr. Barros received the refill request, however, she cannot continue to fill this medication without lab work.   Labs were due last summer.   Dr. Barros said you were notified in  August that you need to come in.    You are also due for an office visit in Feb.    If you  would like to come in earlier and do them both at once, that is fine.   If you are able to schedule an appointment, she will fill enough medication to get you thru to the office appointment.  Patient notified thru My Chart.  ANIBAL Romo RN

## 2018-01-30 NOTE — TELEPHONE ENCOUNTER
Dr. Barros patient has scheduled an appointment on 02/09.  She has been off medication for six days now.-not sure how that will affect the lab next week.  ANIBAL Romo RN
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
## 2018-02-09 ENCOUNTER — OFFICE VISIT (OUTPATIENT)
Dept: PEDIATRICS | Facility: CLINIC | Age: 53
End: 2018-02-09
Payer: COMMERCIAL

## 2018-02-09 VITALS
TEMPERATURE: 98 F | BODY MASS INDEX: 38.86 KG/M2 | HEIGHT: 66 IN | OXYGEN SATURATION: 98 % | HEART RATE: 85 BPM | DIASTOLIC BLOOD PRESSURE: 70 MMHG | WEIGHT: 241.8 LBS | SYSTOLIC BLOOD PRESSURE: 98 MMHG

## 2018-02-09 DIAGNOSIS — R60.0 EDEMA OF BOTH LEGS: ICD-10-CM

## 2018-02-09 DIAGNOSIS — E03.9 HYPOTHYROIDISM, UNSPECIFIED TYPE: ICD-10-CM

## 2018-02-09 DIAGNOSIS — F32.1 MAJOR DEPRESSIVE DISORDER, SINGLE EPISODE, MODERATE (H): ICD-10-CM

## 2018-02-09 DIAGNOSIS — F41.9 ANXIETY: Primary | ICD-10-CM

## 2018-02-09 DIAGNOSIS — Z12.11 SPECIAL SCREENING FOR MALIGNANT NEOPLASMS, COLON: ICD-10-CM

## 2018-02-09 DIAGNOSIS — F51.01 PRIMARY INSOMNIA: ICD-10-CM

## 2018-02-09 DIAGNOSIS — E66.09 NON MORBID OBESITY DUE TO EXCESS CALORIES: ICD-10-CM

## 2018-02-09 LAB
ANION GAP SERPL CALCULATED.3IONS-SCNC: 4 MMOL/L (ref 3–14)
BUN SERPL-MCNC: 21 MG/DL (ref 7–30)
CALCIUM SERPL-MCNC: 9.1 MG/DL (ref 8.5–10.1)
CHLORIDE SERPL-SCNC: 104 MMOL/L (ref 94–109)
CO2 SERPL-SCNC: 30 MMOL/L (ref 20–32)
CREAT SERPL-MCNC: 1 MG/DL (ref 0.52–1.04)
GFR SERPL CREATININE-BSD FRML MDRD: 58 ML/MIN/1.7M2
GLUCOSE SERPL-MCNC: 93 MG/DL (ref 70–99)
POTASSIUM SERPL-SCNC: 3.6 MMOL/L (ref 3.4–5.3)
SODIUM SERPL-SCNC: 138 MMOL/L (ref 133–144)
T4 FREE SERPL-MCNC: 1.04 NG/DL (ref 0.76–1.46)
TSH SERPL DL<=0.005 MIU/L-ACNC: 32.27 MU/L (ref 0.4–4)

## 2018-02-09 PROCEDURE — 80048 BASIC METABOLIC PNL TOTAL CA: CPT | Performed by: INTERNAL MEDICINE

## 2018-02-09 PROCEDURE — 84443 ASSAY THYROID STIM HORMONE: CPT | Performed by: INTERNAL MEDICINE

## 2018-02-09 PROCEDURE — 36415 COLL VENOUS BLD VENIPUNCTURE: CPT | Performed by: INTERNAL MEDICINE

## 2018-02-09 PROCEDURE — 84439 ASSAY OF FREE THYROXINE: CPT | Performed by: INTERNAL MEDICINE

## 2018-02-09 PROCEDURE — 99214 OFFICE O/P EST MOD 30 MIN: CPT | Performed by: INTERNAL MEDICINE

## 2018-02-09 RX ORDER — HYDROCHLOROTHIAZIDE 25 MG/1
25 TABLET ORAL DAILY
Qty: 90 TABLET | Refills: 3 | Status: SHIPPED | OUTPATIENT
Start: 2018-02-09 | End: 2019-01-04

## 2018-02-09 RX ORDER — LORAZEPAM 0.5 MG/1
.25-.5 TABLET ORAL EVERY 8 HOURS PRN
Qty: 20 TABLET | Refills: 0 | Status: SHIPPED | OUTPATIENT
Start: 2018-02-09 | End: 2019-01-04

## 2018-02-09 RX ORDER — TRAZODONE HYDROCHLORIDE 50 MG/1
100 TABLET, FILM COATED ORAL
Qty: 180 TABLET | Refills: 3 | Status: SHIPPED | OUTPATIENT
Start: 2018-02-09 | End: 2019-01-04

## 2018-02-09 RX ORDER — SPIRONOLACTONE 50 MG/1
100 TABLET, FILM COATED ORAL DAILY
Qty: 180 TABLET | Refills: 3 | Status: SHIPPED | OUTPATIENT
Start: 2018-02-09 | End: 2019-01-04

## 2018-02-09 ASSESSMENT — ANXIETY QUESTIONNAIRES
IF YOU CHECKED OFF ANY PROBLEMS ON THIS QUESTIONNAIRE, HOW DIFFICULT HAVE THESE PROBLEMS MADE IT FOR YOU TO DO YOUR WORK, TAKE CARE OF THINGS AT HOME, OR GET ALONG WITH OTHER PEOPLE: SOMEWHAT DIFFICULT
5. BEING SO RESTLESS THAT IT IS HARD TO SIT STILL: NOT AT ALL
GAD7 TOTAL SCORE: 12
6. BECOMING EASILY ANNOYED OR IRRITABLE: SEVERAL DAYS
7. FEELING AFRAID AS IF SOMETHING AWFUL MIGHT HAPPEN: MORE THAN HALF THE DAYS
2. NOT BEING ABLE TO STOP OR CONTROL WORRYING: MORE THAN HALF THE DAYS
1. FEELING NERVOUS, ANXIOUS, OR ON EDGE: MORE THAN HALF THE DAYS
3. WORRYING TOO MUCH ABOUT DIFFERENT THINGS: MORE THAN HALF THE DAYS

## 2018-02-09 ASSESSMENT — PATIENT HEALTH QUESTIONNAIRE - PHQ9: 5. POOR APPETITE OR OVEREATING: NEARLY EVERY DAY

## 2018-02-09 NOTE — NURSING NOTE
"Chief Complaint   Patient presents with     Anxiety     Depression     Thyroid Problem       Initial BP 98/70 (BP Location: Right arm, Patient Position: Sitting, Cuff Size: Adult Large)  Pulse 85  Temp 98  F (36.7  C) (Tympanic)  Ht 5' 6\" (1.676 m)  Wt 241 lb 12.8 oz (109.7 kg)  SpO2 98%  BMI 39.03 kg/m2 Estimated body mass index is 39.03 kg/(m^2) as calculated from the following:    Height as of this encounter: 5' 6\" (1.676 m).    Weight as of this encounter: 241 lb 12.8 oz (109.7 kg).  Medication Reconciliation: complete   Niya Brandon LPN      "

## 2018-02-09 NOTE — MR AVS SNAPSHOT
After Visit Summary   2/9/2018    Amita Finley    MRN: [id number]           Patient Information     Date Of Birth          1965        Visit Information        Provider Department      2/9/2018 8:40 AM Amanda Barros MD [facility]        Today's Diagnoses     Anxiety    -  1    Major depressive disorder, single episode, moderate (H)        Obesity        Edema of both legs        Hypothyroidism, unspecified type        Primary insomnia        Special screening for malignant neoplasms, colon          Care Instructions    1. Refilled all medication, except for the levothyroxine  2. Labs today: electrolytes, kidney function and thyroid  3. Schedule pap and mammo  4. You will be contacted to set up the colonoscopy          Follow-ups after your visit        Additional Services     BARIATRIC ADULT REFERRAL       Your provider has referred you to: [facility]: [facility] [phone]. [email or web address]    Please be aware that coverage of these services is subject to the terms and limitations of your health insurance plan.  Call member services at your health plan with any benefit or coverage questions.      Please bring the following with you to your appointment:      (1) List of current medications   (2) This referral request   (3) Any documents/labs given to you for this referral            GASTROENTEROLOGY ADULT REF PROCEDURE ONLY       Last Lab Result: Creatinine (mg/dL)       Date                     Value                 06/24/2017               1.04             ----------  Body mass index is 39.03 kg/(m^2).      Patient will be contacted to schedule procedure.     Please be aware that coverage of these services is subject to the terms and limitations of your health insurance plan.  Call member services at your health plan with any benefit or coverage questions.   "Any procedures must be performed at a [place] facility OR coordinated by your clinic's referral office.    Please bring the following with you to your appointment:    (1) Any X-Rays, CTs or MRIs which have been performed.  Contact the facility where they were done to arrange for  prior to your scheduled appointment.    (2) List of current medications   (3) This referral request   (4) Any documents/labs given to you for this referral                  Who to contact     If you have questions or need follow up information about today's clinic visit or your schedule please contact [facility] [place] directly at [phone].  Normal or non-critical lab and imaging results will be communicated to you by Andre Phillipehart, letter or phone within 4 business days after the clinic has received the results. If you do not hear from us within 7 days, please contact the clinic through Andre Phillipehart or phone. If you have a critical or abnormal lab result, we will notify you by phone as soon as possible.  Submit refill requests through [employer] or call your pharmacy and they will forward the refill request to us. Please allow 3 business days for your refill to be completed.          Additional Information About Your Visit        Andre Phillipehart Information     [employer] gives you secure access to your electronic health record. If you see a primary care provider, you can also send messages to your care team and make appointments. If you have questions, please call your primary care clinic.  If you do not have a primary care provider, please call [phone] and they will assist you.        Care EveryWhere ID     This is your Care EveryWhere ID. This could be used by other organizations to access your [place] medical records  [id number]        Your Vitals Were     Pulse Temperature Height Pulse Oximetry BMI (Body Mass Index)       85 98  F (36.7  C) (Tympanic) 5' 6\" (1.676 m) 98% 39.03 kg/m2        Blood Pressure from Last 3 Encounters: "   02/09/18 98/70   06/25/17 119/71   02/20/17 114/72    Weight from Last 3 Encounters:   02/09/18 241 lb 12.8 oz (109.7 kg)   06/24/17 230 lb (104.3 kg)   02/20/17 215 lb 14.4 oz (97.9 kg)              We Performed the Following     BARIATRIC ADULT REFERRAL     Basic metabolic panel     GASTROENTEROLOGY ADULT REF PROCEDURE ONLY     T4, free     TSH          Today's Medication Changes          These changes are accurate as of 2/9/18  9:15 AM.  If you have any questions, ask your nurse or doctor.               These medicines have changed or have updated prescriptions.        Dose/Directions    sertraline 50 MG tablet   Commonly known as:  ZOLOFT   This may have changed:  See the new instructions.   Used for:  Anxiety, Major depressive disorder, single episode, moderate (H)   Changed by:  Amanda Braros MD        Dose:  75 mg   Take 1.5 tablets (75 mg) by mouth daily   Quantity:  135 tablet   Refills:  1       spironolactone 50 MG tablet   Commonly known as:  ALDACTONE   This may have changed:  See the new instructions.   Used for:  Edema of both legs   Changed by:  Amanda Barros MD        Dose:  100 mg   Take 2 tablets (100 mg) by mouth daily   Quantity:  180 tablet   Refills:  3            Where to get your medicines      These medications were sent to [facility] [place] - [address]   [address] [place] [address]     Phone:  [phone]     hydrochlorothiazide 25 MG tablet    sertraline 50 MG tablet    spironolactone 50 MG tablet    traZODone 50 MG tablet         Some of these will need a paper prescription and others can be bought over the counter.  Ask your nurse if you have questions.     Bring a paper prescription for each of these medications     LORazepam 0.5 MG tablet                Primary Care Provider Office Phone # Fax #    Amanda Barros [phone] [address] [place]  [address]        Equal Access to Services     [facility]: Hadii memo barron tawnymaicol Boogieali, wamichelleda luqjose, qaabeta jorgeshannannorman de jesus. So [facility] [phone].    ATENCIÓN: Si habla español, tiene a lopez disposición servicios gratuitos de asistencia lingüística. Anthony al [phone].    We comply with applicable federal civil rights laws and [place] laws. We do not discriminate on the basis of race, color, national origin, age, disability, sex, sexual orientation, or gender identity.            Thank you!     Thank you for choosing [facility] [place]  for your care. Our goal is always to provide you with excellent care. Hearing back from our patients is one way we can continue to improve our services. Please take a few minutes to complete the written survey that you may receive in the mail after your visit with us. Thank you!             Your Updated Medication List - Protect others around you: Learn how to safely use, store and throw away your medicines at www.disposemymeds.org.          This list is accurate as of 2/9/18  9:15 AM.  Always use your most recent med list.                   Brand Name Dispense Instructions for use Diagnosis    diclofenac 1 % Gel topical gel    VOLTAREN    100 g    Apply 4 grams to ankle or 2 grams to hands four times daily using enclosed dosing card.    Bursitis of right ankle       hydrochlorothiazide 25 MG tablet    HYDRODIURIL    90 tablet    Take 1 tablet (25 mg) by mouth daily    Edema of both legs       hydrOXYzine 25 MG tablet    ATARAX    60 tablet    Take 1-2 tablets (25-50 mg) by mouth every 6 hours as needed for anxiety    Anxiety       ibuprofen 800 MG tablet    ADVIL/MOTRIN    20 tablet    Take 1 tablet (800 mg) by mouth every 6 hours as needed for moderate pain        levothyroxine 137 MCG tablet    SYNTHROID/LEVOTHROID    12 tablet    TAKE 1 TABLET BY MOUTH EVERY DAY.    Hypothyroidism, unspecified type       LORazepam 0.5 MG  tablet    ATIVAN    20 tablet    Take 0.5-1 tablets (0.25-0.5 mg) by mouth every 8 hours as needed for anxiety    Anxiety       sertraline 50 MG tablet    ZOLOFT    135 tablet    Take 1.5 tablets (75 mg) by mouth daily    Anxiety, Major depressive disorder, single episode, moderate (H)       spironolactone 50 MG tablet    ALDACTONE    180 tablet    Take 2 tablets (100 mg) by mouth daily    Edema of both legs       traZODone 50 MG tablet    DESYREL    180 tablet    Take 2 tablets (100 mg) by mouth nightly as needed for sleep    Primary insomnia

## 2018-02-09 NOTE — PATIENT INSTRUCTIONS
1. Refilled all medication, except for the levothyroxine  2. Labs today: electrolytes, kidney function and thyroid  3. Schedule pap and mammo  4. You will be contacted to set up the colonoscopy

## 2018-02-10 RX ORDER — LEVOTHYROXINE SODIUM 137 UG/1
TABLET ORAL
Qty: 90 TABLET | Refills: 1 | Status: SHIPPED | OUTPATIENT
Start: 2018-02-10 | End: 2018-11-26 | Stop reason: DRUGHIGH

## 2018-02-10 ASSESSMENT — PATIENT HEALTH QUESTIONNAIRE - PHQ9: SUM OF ALL RESPONSES TO PHQ QUESTIONS 1-9: 8

## 2018-02-10 ASSESSMENT — ANXIETY QUESTIONNAIRES: GAD7 TOTAL SCORE: 12

## 2018-02-13 ENCOUNTER — HOSPITAL ENCOUNTER (OUTPATIENT)
Facility: CLINIC | Age: 53
End: 2018-02-13
Attending: INTERNAL MEDICINE | Admitting: INTERNAL MEDICINE
Payer: COMMERCIAL

## 2018-03-21 RX ORDER — ONDANSETRON 2 MG/ML
4 INJECTION INTRAMUSCULAR; INTRAVENOUS
Status: CANCELLED | OUTPATIENT
Start: 2018-03-21

## 2018-03-21 RX ORDER — LIDOCAINE 40 MG/G
CREAM TOPICAL
Status: CANCELLED | OUTPATIENT
Start: 2018-03-21

## 2018-09-25 DIAGNOSIS — F41.9 ANXIETY: ICD-10-CM

## 2018-09-26 RX ORDER — HYDROXYZINE HYDROCHLORIDE 25 MG/1
TABLET, FILM COATED ORAL
Qty: 60 TABLET | Refills: 1 | Status: SHIPPED | OUTPATIENT
Start: 2018-09-26 | End: 2019-10-27

## 2018-09-26 NOTE — TELEPHONE ENCOUNTER
Routing refill request to provider for review/approval because:  A break in medication - last Rx from Feb 2017  Christie VIEYRA RN

## 2018-09-26 NOTE — TELEPHONE ENCOUNTER
"Requested Prescriptions   Pending Prescriptions Disp Refills     hydrOXYzine (ATARAX) 25 MG tablet [Pharmacy Med Name: hydrOXYzine HCL 25mg TAB]    Last Written Prescription Date:  2/20/2017  Last Fill Quantity: 60,  # refills: 2   Last office visit: 2/9/2018 with prescribing provider:  Amanda Barros     Future Office Visit:     60 tablet 1     Sig: TAKE 1-2 TABLET(S) BY MOUTH EVERY 6 HOURS AS NEEDED FOR ANXIETY    Antihistamines Protocol Passed    9/25/2018  9:21 PM       Passed - Recent (12 mo) or future (30 days) visit within the authorizing provider's specialty    Patient had office visit in the last 12 months or has a visit in the next 30 days with authorizing provider or within the authorizing provider's specialty.  See \"Patient Info\" tab in inbasket, or \"Choose Columns\" in Meds & Orders section of the refill encounter.           Passed - Patient is age 3 or older    Apply age and/or weight-based dosing for peds patients age 3 and older.    Forward request to provider for patients under the age of 3.            "

## 2018-11-16 ENCOUNTER — TELEPHONE (OUTPATIENT)
Dept: PEDIATRICS | Facility: CLINIC | Age: 53
End: 2018-11-16

## 2018-11-16 NOTE — LETTER
November 16, 2018      Amita Finley  [address] [address]        Dear Amita,       We care about your health and have reviewed your health plan including your medical conditions, medications, and lab results.  Based on this review, it is recommended that you follow up regarding the following health topic(s):  -Depression  -Breast Cancer Screening  -Colon Cancer Screening  -Cervical Cancer Screening  -Wellness (Physical) Visit     We recommend you take the following action(s):  -schedule a WELLNESS (Physical) APPOINTMENT.  We will perform the following labs: none due.     Please call us at the [facility] - [phone] (or use [facility]) to address the above recommendations.     Thank you for trusting [facility] and we appreciate the opportunity to serve you.  We look forward to supporting your healthcare needs in the future.    Healthy Regards,    Your Health Care Team  [facility]

## 2018-11-16 NOTE — TELEPHONE ENCOUNTER
Panel Management Review      Patient has the following on her problem list:     Depression / Dysthymia review    Measure:  Needs PHQ-9 score of 4 or less during index window.  Administer PHQ-9 and if score is 5 or more, send encounter to provider for next steps.    5 - 7 month window range:     PHQ-9 SCORE 4/2/2017 12/19/2017 2/9/2018   Total Score MyChart - 7 (Mild depression) -   Total Score 8 7 8       If PHQ-9 recheck is 5 or more, route to provider for next steps.    Patient is due for:  PHQ9 and DAP      Composite cancer screening  Chart review shows that this patient is due/due soon for the following Pap Smear, Mammogram and Colonoscopy  Summary:    Patient is due/failing the following:   COLONOSCOPY, DAP, MAMMOGRAM, PAP, PHQ9 and PHYSICAL    Action needed:   Patient needs office visit for Physical, PAP, colonoscopy, mammogram    Type of outreach:    Sent letter.    Questions for provider review:    None                                                                                                                                    Niya Brandon LPN       Chart routed to Care Team .

## 2018-11-26 ENCOUNTER — RADIANT APPOINTMENT (OUTPATIENT)
Dept: MAMMOGRAPHY | Facility: CLINIC | Age: 53
End: 2018-11-26
Payer: COMMERCIAL

## 2018-11-26 ENCOUNTER — OFFICE VISIT (OUTPATIENT)
Dept: PEDIATRICS | Facility: CLINIC | Age: 53
End: 2018-11-26
Payer: COMMERCIAL

## 2018-11-26 ENCOUNTER — OFFICE VISIT (OUTPATIENT)
Dept: OPTOMETRY | Facility: CLINIC | Age: 53
End: 2018-11-26
Payer: COMMERCIAL

## 2018-11-26 VITALS
HEIGHT: 66 IN | OXYGEN SATURATION: 98 % | HEART RATE: 86 BPM | WEIGHT: 242.4 LBS | BODY MASS INDEX: 38.96 KG/M2 | SYSTOLIC BLOOD PRESSURE: 110 MMHG | DIASTOLIC BLOOD PRESSURE: 68 MMHG | TEMPERATURE: 98.3 F

## 2018-11-26 DIAGNOSIS — R60.0 EDEMA OF BOTH LEGS: ICD-10-CM

## 2018-11-26 DIAGNOSIS — H02.889 MEIBOMIAN GLAND DYSFUNCTION: ICD-10-CM

## 2018-11-26 DIAGNOSIS — H04.129 DRY EYE: ICD-10-CM

## 2018-11-26 DIAGNOSIS — E06.3 HYPOTHYROIDISM DUE TO HASHIMOTO'S THYROIDITIS: Primary | ICD-10-CM

## 2018-11-26 DIAGNOSIS — Z12.11 SPECIAL SCREENING FOR MALIGNANT NEOPLASMS, COLON: ICD-10-CM

## 2018-11-26 DIAGNOSIS — H52.13 MYOPIA OF BOTH EYES: Primary | ICD-10-CM

## 2018-11-26 DIAGNOSIS — Z12.31 VISIT FOR SCREENING MAMMOGRAM: ICD-10-CM

## 2018-11-26 DIAGNOSIS — M19.90 INFLAMMATORY ARTHRITIS: ICD-10-CM

## 2018-11-26 DIAGNOSIS — F32.1 MAJOR DEPRESSIVE DISORDER, SINGLE EPISODE, MODERATE (H): ICD-10-CM

## 2018-11-26 DIAGNOSIS — H52.4 PRESBYOPIA: ICD-10-CM

## 2018-11-26 DIAGNOSIS — F41.9 ANXIETY: ICD-10-CM

## 2018-11-26 DIAGNOSIS — E66.01 MORBID OBESITY (H): ICD-10-CM

## 2018-11-26 PROCEDURE — 92004 COMPRE OPH EXAM NEW PT 1/>: CPT | Performed by: OPTOMETRIST

## 2018-11-26 PROCEDURE — 84443 ASSAY THYROID STIM HORMONE: CPT | Performed by: INTERNAL MEDICINE

## 2018-11-26 PROCEDURE — 84439 ASSAY OF FREE THYROXINE: CPT | Performed by: INTERNAL MEDICINE

## 2018-11-26 PROCEDURE — 92015 DETERMINE REFRACTIVE STATE: CPT | Performed by: OPTOMETRIST

## 2018-11-26 PROCEDURE — 77067 SCR MAMMO BI INCL CAD: CPT | Mod: TC

## 2018-11-26 PROCEDURE — 99214 OFFICE O/P EST MOD 30 MIN: CPT | Performed by: INTERNAL MEDICINE

## 2018-11-26 PROCEDURE — 36415 COLL VENOUS BLD VENIPUNCTURE: CPT | Performed by: INTERNAL MEDICINE

## 2018-11-26 PROCEDURE — 80048 BASIC METABOLIC PNL TOTAL CA: CPT | Performed by: INTERNAL MEDICINE

## 2018-11-26 RX ORDER — LEVOTHYROXINE SODIUM 150 UG/1
TABLET ORAL
COMMUNITY
Start: 2018-10-04 | End: 2018-11-27

## 2018-11-26 RX ORDER — ESCITALOPRAM OXALATE 10 MG/1
5 TABLET ORAL DAILY
Qty: 30 TABLET | Refills: 1 | Status: SHIPPED | OUTPATIENT
Start: 2018-11-26 | End: 2019-01-04

## 2018-11-26 ASSESSMENT — CONF VISUAL FIELD
METHOD: COUNTING FINGERS
OD_NORMAL: 1
OS_NORMAL: 1

## 2018-11-26 ASSESSMENT — VISUAL ACUITY
OS_CC+: -1
OS_SC: 20/30-2
OD_SC: 20/30-2
CORRECTION_TYPE: GLASSES
OD_CC+: -2
OD_CC: 20/30
OD_SC+: -2
OD_SC: 20/40
METHOD: SNELLEN - LINEAR
OS_SC+: -2
OS_CC: 20/20
OS_SC: 20/25

## 2018-11-26 ASSESSMENT — TONOMETRY
OS_IOP_MMHG: 17
IOP_METHOD: APPLANATION
OD_IOP_MMHG: 17

## 2018-11-26 ASSESSMENT — ANXIETY QUESTIONNAIRES
5. BEING SO RESTLESS THAT IT IS HARD TO SIT STILL: MORE THAN HALF THE DAYS
1. FEELING NERVOUS, ANXIOUS, OR ON EDGE: NEARLY EVERY DAY
7. FEELING AFRAID AS IF SOMETHING AWFUL MIGHT HAPPEN: NEARLY EVERY DAY
IF YOU CHECKED OFF ANY PROBLEMS ON THIS QUESTIONNAIRE, HOW DIFFICULT HAVE THESE PROBLEMS MADE IT FOR YOU TO DO YOUR WORK, TAKE CARE OF THINGS AT HOME, OR GET ALONG WITH OTHER PEOPLE: SOMEWHAT DIFFICULT
GAD7 TOTAL SCORE: 15
2. NOT BEING ABLE TO STOP OR CONTROL WORRYING: MORE THAN HALF THE DAYS
6. BECOMING EASILY ANNOYED OR IRRITABLE: NOT AT ALL
3. WORRYING TOO MUCH ABOUT DIFFERENT THINGS: MORE THAN HALF THE DAYS

## 2018-11-26 ASSESSMENT — EXTERNAL EXAM - LEFT EYE: OS_EXAM: NORMAL

## 2018-11-26 ASSESSMENT — REFRACTION_MANIFEST
OD_SPHERE: -1.75
OS_SPHERE: -1.00
OD_CYLINDER: +0.50
METHOD_AUTOREFRACTION: 1
OS_SPHERE: -1.00
OD_CYLINDER: +0.50
OS_CYLINDER: +0.25
OD_AXIS: 100
OS_AXIS: 060
OS_AXIS: 079
OS_CYLINDER: +0.25
OD_SPHERE: -2.00
OD_AXIS: 087

## 2018-11-26 ASSESSMENT — EXTERNAL EXAM - RIGHT EYE: OD_EXAM: NORMAL

## 2018-11-26 ASSESSMENT — CUP TO DISC RATIO
OS_RATIO: 0.5
OD_RATIO: 0.5

## 2018-11-26 ASSESSMENT — REFRACTION_WEARINGRX
OS_CYLINDER: SPHERE
OS_SPHERE: -1.00
OD_CYLINDER: SPHERE
SPECS_TYPE: SVL
OD_SPHERE: -1.00

## 2018-11-26 ASSESSMENT — PATIENT HEALTH QUESTIONNAIRE - PHQ9
SUM OF ALL RESPONSES TO PHQ QUESTIONS 1-9: 8
5. POOR APPETITE OR OVEREATING: NEARLY EVERY DAY

## 2018-11-26 NOTE — PROGRESS NOTES
Chief Complaint   Patient presents with     COMPREHENSIVE EYE EXAM     3-4 years since last exam.        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  Glasses for driving       Distance Vision Acuity: Noticed gradual change in both eyes    Near Vision Acuity: Satisfied with vision while reading  unaided    Eye Comfort: dry  Do you use eye drops? 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       OBJECTIVE: See Ophthalmology exam    ASSESSMENT:    ICD-10-CM    1. Myopia of both eyes H52.13    2. Presbyopia H52.4    3. Dry eye H04.129    4. Meibomian gland dysfunction H02.889         PLAN:   Get past medical information from patient   Discontinue visine/ warm compresses/ / add artificial tears      Vilma Andrade OD

## 2018-11-26 NOTE — PATIENT INSTRUCTIONS
Meibomian gland dysfunction or Posterior Blepharitis, is characterized by inflammation along both the uppper and lower eyelid margins. A single row of these glands is present in each lid with openings along the lid margins.  It is often found in association with skin conditions such as rosacea and seborrheic dermatitis.    Symptoms include:  ?Red eyes  ?Gritty or burning sensation  ?Excessive tearing  ?Itchy eyelids  ?Red, swollen eyelids  ?Crusting or matting of eyelashes in the morning  ?Light sensitivity  ?Blurred vision    It is important to keep cosmetics from blocking these oil glands. If blocked, they do not   excrete oil into the tear film, which causes the tears to evaporate quickly.   This may result in watery eyes.  There is also an increase of bacterial growth when the tear film is unstable, leading to further ocular surface inflammation.    Warm compresses are very beneficial to the normal functioning of the eye.  Warm compresses for 5-10 minutes twice daily and keeping the lid margins clean  and help maintain a good tear film.   Moisten a washcloth with hot water, or microwave for 10 seconds, being careful to not get the cloth too hot.   Then put the washcloth onto your eyelids for 5 minutes. It will cool quickly so a rice pack or eyemask that can be heated and laid on top of the washcloth will help retain the heat.    Omega 3 fatty acid supplements taken once to twice daily and artificial tears such as Soothe xp, Refresh optive , Retaine and systane balance are also an additional treatment to control inflammation and help soothe your eyes.       DRY EYE TREATMENT    I recommend using artificial tears for your dry eye. There are over the counter drops that work well and may be used up to 4x daily. ( systane balance, refresh optive, soothe xp)   If you need more than 4 drops daily, use a preservative free product which come in individual vials which may be used for 24 hours and discarded.     Artificial  tears work best as a preventative and not as well after your eyes are starting to bother you.  It may take 4- 6 weeks of using the drops before you notice improvement.  If after that time you are still having problems schedule an appointment for an evaluation and discussion of different treatments.  Dry eyes are a chronic condition and you may have more symptoms at certain times of the year.      Additional recommended treatment:  Warm compresses once to twice daily for 5-10 minutes    Directions for warm soaks  There are few methods for hot compresses. Moisten a washcloth with hot water, or microwave for 10 seconds, being careful to not get the cloth too hot.   Then put the washcloth onto your eyelids for 5 minutes. It will cool quickly so a rice pack or eyemask that can be heated and laid on top of the washcloth will help retain the heat.          Omega 3 fatty acid supplements taken 1-2x daily  Recommend  at least  2000mg omega 3  800 EPA  600 DHA    Blink regularly  Stay hydrated/ increase humidity  Wear sunglasses     Discontinue visine  Distance prescription change in R eye  Consider single vision reading prescription as well

## 2018-11-26 NOTE — LETTER
My Depression Action Plan  Name: Amita Finley   Date of Birth 1965  Date: 11/26/2018    My doctor: Amanda Barros   My clinic: [address]  [address]  [facility] [address] [phone]          GREEN    ZONE   Good Control    What it looks like:     Things are going generally well. You have normal up s and down s. You may even feel depressed from time to time, but bad moods usually last less than a day.   What you need to do:  1. Continue to care for yourself (see self care plan)  2. Check your depression survival kit and update it as needed  3. Follow your physician s recommendations including any medication.  4. Do not stop taking medication unless you consult with your physician first.           YELLOW         ZONE Getting Worse    What it looks like:     Depression is starting to interfere with your life.     It may be hard to get out of bed; you may be starting to isolate yourself from others.    Symptoms of depression are starting to last most all day and this has happened for several days.     You may have suicidal thoughts but they are not constant.   What you need to do:     1. Call your care team, your response to treatment will improve if you keep your care team informed of your progress. Yellow periods are signs an adjustment may need to be made.     2. Continue your self-care, even if you have to fake it!    3. Talk to someone in your support network    4. Open up your depression survival kit           RED    ZONE Medical Alert - Get Help    What it looks like:     Depression is seriously interfering with your life.     You may experience these or other symptoms: You can t get out of bed most days, can t work or engage in other necessary activities, you have trouble taking care of basic hygiene, or basic responsibilities, thoughts of suicide or death that will not go away, self-injurious behavior.     What you need to  do:  1. Call your care team and request a same-day appointment. If they are not available (weekends or after hours) call your local crisis line, emergency room or 911.            Depression Self Care Plan / Survival Kit    Self-Care for Depression  Here s the deal. Your body and mind are really not as separate as most people think.  What you do and think affects how you feel and how you feel influences what you do and think. This means if you do things that people who feel good do, it will help you feel better.  Sometimes this is all it takes.  There is also a place for medication and therapy depending on how severe your depression is, so be sure to consult with your medical provider and/ or Behavioral Health Consultant if your symptoms are worsening or not improving.     In order to better manage my stress, I will:    Exercise  Get some form of exercise, every day. This will help reduce pain and release endorphins, the  feel good  chemicals in your brain. This is almost as good as taking antidepressants!  This is not the same as joining a gym and then never going! (they count on that by the way ) It can be as simple as just going for a walk or doing some gardening, anything that will get you moving.      Hygiene   Maintain good hygiene (Get out of bed in the morning, Make your bed, Brush your teeth, Take a shower, and Get dressed like you were going to work, even if you are unemployed).  If your clothes don't fit try to get ones that do.    Diet  I will strive to eat foods that are good for me, drink plenty of water, and avoid excessive sugar, caffeine, alcohol, and other mood-altering substances.  Some foods that are helpful in depression are: complex carbohydrates, B vitamins, flaxseed, fish or fish oil, fresh fruits and vegetables.    Psychotherapy  I agree to participate in Individual Therapy (if recommended).    Medication  If prescribed medications, I agree to take them.  Missing doses can result in serious  side effects.  I understand that drinking alcohol, or other illicit drug use, may cause potential side effects.  I will not stop my medication abruptly without first discussing it with my provider.    Staying Connected With Others  I will stay in touch with my friends, family members, and my primary care provider/team.    Use your imagination  Be creative.  We all have a creative side; it doesn t matter if it s oil painting, sand castles, or mud pies! This will also kick up the endorphins.    Witness Beauty  (AKA stop and smell the roses) Take a look outside, even in mid-winter. Notice colors, textures. Watch the squirrels and birds.     Service to others  Be of service to others.  There is always someone else in need.  By helping others we can  get out of ourselves  and remember the really important things.  This also provides opportunities for practicing all the other parts of the program.    Humor  Laugh and be silly!  Adjust your TV habits for less news and crime-drama and more comedy.    Control your stress  Try breathing deep, massage therapy, biofeedback, and meditation. Find time to relax each day.     My support system    Clinic Contact:  Phone number:    Contact 1:  Phone number:    Contact 2:  Phone number:    Quaker/:  Phone number:    Therapist:  Phone number:    Local crisis center:    Phone number:    Other community support:  Phone number:

## 2018-11-26 NOTE — LETTER
11/26/2018         RE: Amita Finley  [address]  [place] [address]        Dear Colleague,    [facility] you [facility]. Please see a copy of my visit note below.    Chief Complaint   Patient presents with     COMPREHENSIVE EYE EXAM     3-4 years since last exam.        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  Glasses for driving       Distance Vision Acuity: Noticed gradual change in both eyes    Near Vision Acuity: Satisfied with vision while reading  unaided    Eye Comfort: dry  Do you use eye drops? 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       OBJECTIVE: See Ophthalmology exam    ASSESSMENT:    ICD-10-CM    1. Myopia of both eyes H52.13    2. Presbyopia H52.4    3. Dry eye H04.129    4. Meibomian gland dysfunction H02.889         PLAN:   Get past medical information from patient   Discontinue visine/ warm compresses/ / add artificial tears      Vilma Andrade OD     Again, thank you for allowing me to participate in the care of your patient.        Sincerely,        Vilma Andrade, OD

## 2018-11-26 NOTE — MR AVS SNAPSHOT
After Visit Summary   11/26/2018    Amita Finley    MRN: [id number]           Patient Information     Date Of Birth          1965        Visit Information        Provider Department      11/26/2018 12:40 PM Vilma Andrade OD [facility] [place]        Today's Diagnoses     Myopia of both eyes    -  1    Presbyopia        Dry eye        Meibomian gland dysfunction          Care Instructions    Meibomian gland dysfunction or Posterior Blepharitis, is characterized by inflammation along both the uppper and lower eyelid margins. A single row of these glands is present in each lid with openings along the lid margins.  It is often found in association with skin conditions such as rosacea and seborrheic dermatitis.    Symptoms include:  ?Red eyes  ?Gritty or burning sensation  ?Excessive tearing  ?Itchy eyelids  ?Red, swollen eyelids  ?Crusting or matting of eyelashes in the morning  ?Light sensitivity  ?Blurred vision    It is important to keep cosmetics from blocking these oil glands. If blocked, they do not   excrete oil into the tear film, which causes the tears to evaporate quickly.   This may result in watery eyes.  There is also an increase of bacterial growth when the tear film is unstable, leading to further ocular surface inflammation.    Warm compresses are very beneficial to the normal functioning of the eye.  Warm compresses for 5-10 minutes twice daily and keeping the lid margins clean  and help maintain a good tear film.   Moisten a washcloth with hot water, or microwave for 10 seconds, being careful to not get the cloth too hot.   Then put the washcloth onto your eyelids for 5 minutes. It will cool quickly so a rice pack or eyemask that can be heated and laid on top of the washcloth will help retain the heat.    Omega 3 fatty acid supplements taken once to twice daily and artificial tears such as Soothe xp, Refresh optive , Retaine and systane balance are also an  additional treatment to control inflammation and help soothe your eyes.       DRY EYE TREATMENT    I recommend using artificial tears for your dry eye. There are over the counter drops that work well and may be used up to 4x daily. ( systane balance, refresh optive, soothe xp)   If you need more than 4 drops daily, use a preservative free product which come in individual vials which may be used for 24 hours and discarded.     Artificial tears work best as a preventative and not as well after your eyes are starting to bother you.  It may take 4- 6 weeks of using the drops before you notice improvement.  If after that time you are still having problems schedule an appointment for an evaluation and discussion of different treatments.  Dry eyes are a chronic condition and you may have more symptoms at certain times of the year.      Additional recommended treatment:  Warm compresses once to twice daily for 5-10 minutes    Directions for warm soaks  There are few methods for hot compresses. Moisten a washcloth with hot water, or microwave for 10 seconds, being careful to not get the cloth too hot.   Then put the washcloth onto your eyelids for 5 minutes. It will cool quickly so a rice pack or eyemask that can be heated and laid on top of the washcloth will help retain the heat.          Omega 3 fatty acid supplements taken 1-2x daily  Recommend  at least  2000mg omega 3  800 EPA  600 DHA    Blink regularly  Stay hydrated/ increase humidity  Wear sunglasses     Discontinue visine  Distance prescription change in R eye  Consider single vision reading prescription as well          Follow-ups after your visit        Follow-up notes from your care team     Return in about 1 year (around 11/26/2019).      Your next 10 appointments already scheduled     Nov 26, 2018  2:45 PM CST   MA SCREENING DIGITAL BILATERAL with EAMA1   [facility] [place] ([facility] [place])    [address] ,[address]  [place]  [id number] [phone]           How do I prepare for my exam? (Food and drink instructions) No Food and Drink Restrictions.  How do I prepare for my exam? (Other instructions) Do not use any powder, lotion or deodorant under your arms or on your breast. If you do, we will ask you to remove it before your exam.  What should I wear: Wear comfortable, two-piece clothing.  How long does the exam take: Most scans will take 15 minutes.  What should I bring: Bring any previous mammograms from other facilities or have them mailed to the breast center.  Do I need a :  No  is needed.  What do I need to tell my doctor: If you have any allergies, tell your care team.  What should I do after the exam: No restrictions, You may resume normal activities.  What is this test: This test is an x-ray of the breast to look for breast disease. The breast is pressed between two plates to flatten and spread the tissue. An X-ray is taken of the breast from different angles.  Who should I call with questions: If you have any questions, please call the Imaging Department where you will have your exam. Directions, parking instructions, and other information is available on our website, [email or web address].  Other information about my exam Three-dimensional (3D) mammograms are available at [facility] locations in [facility], [facility], [place], [facility] and [place]. [facility] locations include [place] and the [facility] in [place].  Benefits of 3D mammograms include * Improved rate of cancer detection * Decreases your chance of having to go back for more tests, which means fewer: * \"False-positive\" results (This means that there is an abnormal area but it isn't cancer.) * Invasive testing procedures, such as a biopsy or surgery * Can provide clearer images of the breast if you have dense breast tissue.  *3D mammography is an optional exam that anyone can have with a 2D " "mammogram. It doesn't replace or take the place of a 2D mammogram. 2D mammograms remain an effective screening test for all women.  Not all insurance companies cover the cost of a 3D mammogram. Check with your insurance. Three-dimensional (3D) mammograms are available at [facility] locations in [facility], [facility], [facility], [place], [place], and [place]. [facility] locations include [place] and [facility] in [place]. Benefits of 3D mammograms include: - Improved rate of cancer detection - Decreases your chance of having to go back for more tests, which means fewer: - \"False-positive\" results (This means that there is an abnormal area but it isn't cancer.) - Invasive testing procedures, such as a biopsy or surgery - Can provide clearer images of the breast if you have dense breast tissue. 3D mammography is an optional exam that anyone can have with a 2D mammogram. It doesn't replace or take the place of a 2D mammogram. 2D mammograms remain an effective screening test for all women.  Not all insurance companies cover the cost of a 3D mammogram. Check with your insurance.              Who to contact     If you have questions or need follow up information about today's clinic visit or your schedule please contact [facility] directly at [phone].  Normal or non-critical lab and imaging results will be communicated to you by MyChart, letter or phone within 4 business days after the clinic has received the results. If you do not hear from us within 7 days, please contact the clinic through [employer] or phone. If you have a critical or abnormal lab result, we will notify you by phone as soon as possible.  Submit refill requests through "[employer]" or call your pharmacy and they will forward the refill request to us. Please allow 3 business days for your refill to be completed.          Additional Information About Your Visit        "[employer] Information     "[employer] gives " you secure access to your electronic health record. If you see a primary care provider, you can also send messages to your care team and make appointments. If you have questions, please call your primary care clinic.  If you do not have a primary care provider, please call [phone] and they will assist you.        Care EveryWhere ID     This is your Care EveryWhere ID. This could be used by other organizations to access your [facility] medical records  [id number]         Blood Pressure from Last 3 Encounters:   02/09/18 98/70   06/25/17 119/71   02/20/17 114/72    Weight from Last 3 Encounters:   02/09/18 109.7 kg (241 lb 12.8 oz)   06/24/17 104.3 kg (230 lb)   02/20/17 97.9 kg (215 lb 14.4 oz)              We Performed the Following     EYE EXAM (SIMPLE-NONBILLABLE)     REFRACTION        Primary Care Provider Office Phone # Fax #    Amanda Barros [phone] [address] [place] [address]        Equal Access to Services     [facility]: Hadii aad ku hadasho Soomaali, waaxda luqadaha, qaybta kaalmada adeegyada, waxay idiin hayaliya doe . So [facility] [phone].    ATENCIÓN: Si habla español, tiene a lopez disposición servicios gratuitos de asistencia lingüística. Llame al [phone].    We comply with applicable federal civil rights laws and [place] laws. We do not discriminate on the basis of race, color, national origin, age, disability, sex, sexual orientation, or gender identity.            Thank you!     Thank you for choosing [facility]  for your care. Our goal is always to provide you with excellent care. Hearing back from our patients is one way we can continue to improve our services. Please take a few minutes to complete the written survey that you may receive in the mail after your visit with us. Thank you!             Your Updated Medication List - Protect others around you: Learn how to safely use, store and throw away your  medicines at www.disposemymeds.org.          This list is accurate as of 11/26/18  2:13 PM.  Always use your most recent med list.                   Brand Name Dispense Instructions for use Diagnosis    diclofenac 1 % topical gel    VOLTAREN    100 g    Apply 4 grams to ankle or 2 grams to hands four times daily using enclosed dosing card.    Bursitis of right ankle       hydrochlorothiazide 25 MG tablet    HYDRODIURIL    90 tablet    Take 1 tablet (25 mg) by mouth daily    Edema of both legs       hydrOXYzine 25 MG tablet    ATARAX    60 tablet    TAKE 1-2 TABLET(S) BY MOUTH EVERY 6 HOURS AS NEEDED FOR ANXIETY    Anxiety       ibuprofen 800 MG tablet    ADVIL/MOTRIN    20 tablet    Take 1 tablet (800 mg) by mouth every 6 hours as needed for moderate pain        levothyroxine 137 MCG tablet    SYNTHROID/LEVOTHROID    90 tablet    TAKE 1 TABLET BY MOUTH EVERY DAY.    Hypothyroidism, unspecified type       LORazepam 0.5 MG tablet    ATIVAN    20 tablet    Take 0.5-1 tablets (0.25-0.5 mg) by mouth every 8 hours as needed for anxiety    Anxiety       sertraline 50 MG tablet    ZOLOFT    135 tablet    Take 1.5 tablets (75 mg) by mouth daily    Anxiety, Major depressive disorder, single episode, moderate (H)       spironolactone 50 MG tablet    ALDACTONE    180 tablet    Take 2 tablets (100 mg) by mouth daily    Edema of both legs       traZODone 50 MG tablet    DESYREL    180 tablet    Take 2 tablets (100 mg) by mouth nightly as needed for sleep    Primary insomnia
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
## 2018-11-26 NOTE — PATIENT INSTRUCTIONS
24 week Weight Healthy Lifestyle Plan:  Located in [facility] or [place]  $499 for 24-week program  Call: [phone]  [email or web address]    Profile by Carter Kirk    1. Start lexapro 5 mg for at least a week, then increase to 10 mg if needed  2. Labs today: thyroid and electrolytes  3. You will be contacted to set up the colonoscopy  4. Come fasting to physical

## 2018-11-26 NOTE — MR AVS SNAPSHOT
After Visit Summary   11/26/2018    Amita Finley    MRN: [id number]           Patient Information     Date Of Birth          1965        Visit Information        Provider Department      11/26/2018 2:00 PM Amanda [facility]ros MD [facility]        Today's Diagnoses     Special screening for malignant neoplasms, colon    -  1    Hypothyroidism, unspecified type        Anxiety        Major depressive disorder, single episode, moderate (H)        Inflammatory arthritis        Morbid obesity (H)        Edema of both legs          Care Instructions    24 week Weight Healthy Lifestyle Plan:  Located in [facility] or [place]  $499 for 24-week program  Call: [phone]  [email or web address]    Profile by [facility] - in [place]    1. Start lexapro 5 mg for at least a week, then increase to 10 mg if needed  2. Labs today: thyroid and electrolytes  3. You will be contacted to set up the colonoscopy  4. Come fasting to physical          Follow-ups after your visit        Additional Services     GASTROENTEROLOGY ADULT REF PROCEDURE ONLY Jairon Weissierge [phone]       Last Lab Result: Creatinine (mg/dL)       Date                     Value                 02/09/2018               1.00             ----------  Body mass index is 39.12 kg/(m^2).      Patient will be contacted to schedule procedure.     Please be aware that coverage of these services is subject to the terms and limitations of your health insurance plan.  Call member services at your health plan with any benefit or coverage questions.  Any procedures must be performed at a [place] facility OR coordinated by your clinic's referral office.    Please bring the following with you to your appointment:    (1) Any X-Rays, CTs or MRIs which have been performed.  Contact the facility where they were done to arrange for  prior to your scheduled appointment.    (2) List of current  medications   (3) This referral request   (4) Any documents/labs given to you for this referral            RHEUMATOLOGY REFERRAL       Your provider has referred you [place]: [facility] [place]:  [facility] Jorge Kirk   [phone] [email or web address]    Please be aware that coverage of these services is subject to the terms and limitations of your health insurance plan.  Call member services at your health plan with any benefit or coverage questions.      Please bring the following with you to your appointment:    (1) Any X-Rays, CTs or MRIs which have been performed.  Contact the facility where they were done to arrange for  prior to your scheduled appointment.    (2) List of current medications   (3) This referral request   (4) Any documents/labs given to you for this referral                  Follow-up notes from your care team     Return in about 5 weeks (around 12/31/2018).      Your next 10 appointments already scheduled     Jan 04, 2019  8:00 AM CST   PHYSICAL with Amanda Barros MD   [facility] [place] ([facility])    [address] [place] [address]  [address]  [facility] [address] [phone]              Who to contact     If you have questions or need follow up information about today's clinic visit or your schedule please contact [facility] directly at [phone].  Normal or non-critical lab and imaging results will be communicated to you by MyChart, letter or phone within 4 business days after the clinic has received the results. If you do not hear from us within 7 days, please contact the clinic through MyChart or phone. If you have a critical or abnormal lab result, we will notify you by phone as soon as possible.  Submit refill requests through [employer] or call your pharmacy and they will forward the refill request to us. Please allow 3 business days for your refill to be completed.          Additional Information About Your Visit        [facility]  "Information     David gives you secure access to your electronic health record. If you see a primary care provider, you can also send messages to your care team and make appointments. If you have questions, please call your primary care clinic.  If you do not have a primary care provider, please call [phone] and they will assist you.        Care EveryWhere ID     This is your Care EveryWhere ID. This could be used by other organizations to access your [place] medical records  [id number]        Your Vitals Were     Pulse Temperature Height Pulse Oximetry BMI (Body Mass Index)       86 98.3  F (36.8  C) (Tympanic) 5' 6\" (1.676 m) 98% 39.12 kg/m2        Blood Pressure from Last 3 Encounters:   11/26/18 110/68   02/09/18 98/70   06/25/17 119/71    Weight from Last 3 Encounters:   11/26/18 242 lb 6.4 oz (110 kg)   02/09/18 241 lb 12.8 oz (109.7 kg)   06/24/17 230 lb (104.3 kg)              We Performed the Following     Basic metabolic panel     GASTROENTEROLOGY ADULT REF PROCEDURE ONLY Jairon Lawson [phone]     RHEUMATOLOGY REFERRAL     T4, free     TSH          Today's Medication Changes          These changes are accurate as of 11/26/18  2:51 PM.  If you have any questions, ask your nurse or doctor.               Start taking these medicines.        Dose/Directions    escitalopram 10 MG tablet   Commonly known as:  LEXAPRO   Used for:  Anxiety, Major depressive disorder, single episode, moderate (H)   Started by:  Amanda Barros MD        Dose:  5 mg   Take 0.5 tablets (5 mg) by mouth daily For 1 week, then increase to 10 mg daily   Quantity:  30 tablet   Refills:  1         These medicines have changed or have updated prescriptions.        Dose/Directions    levothyroxine 150 MCG tablet   Commonly known as:  SYNTHROID/LEVOTHROID   This may have changed:  Another medication with the same name was removed. Continue taking this medication, and follow the directions you see here.   Changed " by:  Amanda Barros MD        Refills:  0            Where to get your medicines      These medications were sent to [facility] [place] - KARLENE Kirk - [address]   [address] [place] [address] [address]     Phone:  [phone]     escitalopram 10 MG tablet                Primary Care Provider Office Phone # Fax #    Amanda Barros [phone]       [address] [place] [address]        Equal Access to Services     [facility]: Hadii aad ku hadasho Soomaali, waaxda luqadaha, qaybta kaalmada adeegyada, waxay idiin hayaan adeeg kharash la'aan . So [facility] [phone].    ATENCIÓN: Si habla español, tiene a lopez disposición servicios gratuitos de asistencia lingüística. [facility] [phone].    We comply with applicable federal civil rights laws and [place] laws. We do not discriminate on the basis of race, color, national origin, age, disability, sex, sexual orientation, or gender identity.            Thank you!     Thank you for choosing [facility]  for your care. Our goal is always to provide you with excellent care. Hearing back from our patients is one way we can continue to improve our services. Please take a few minutes to complete the written survey that you may receive in the mail after your visit with us. Thank you!             Your Updated Medication List - Protect others around you: Learn how to safely use, store and throw away your medicines at www.disposemymeds.org.          This list is accurate as of 11/26/18  2:51 PM.  Always use your most recent med list.                   Brand Name Dispense Instructions for use Diagnosis    diclofenac 1 % topical gel    VOLTAREN    100 g    Apply 4 grams to ankle or 2 grams to hands four times daily using enclosed dosing card.    Bursitis of right ankle       escitalopram 10 MG tablet    LEXAPRO    30 tablet    Take 0.5 tablets (5 mg) by mouth daily For 1 week, then  increase to 10 mg daily    Anxiety, Major depressive disorder, single episode, moderate (H)       hydrochlorothiazide 25 MG tablet    HYDRODIURIL    90 tablet    Take 1 tablet (25 mg) by mouth daily    Edema of both legs       hydrOXYzine 25 MG tablet    ATARAX    60 tablet    TAKE 1-2 TABLET(S) BY MOUTH EVERY 6 HOURS AS NEEDED FOR ANXIETY    Anxiety       ibuprofen 800 MG tablet    ADVIL/MOTRIN    20 tablet    Take 1 tablet (800 mg) by mouth every 6 hours as needed for moderate pain        levothyroxine 150 MCG tablet    SYNTHROID/LEVOTHROID          LORazepam 0.5 MG tablet    ATIVAN    20 tablet    Take 0.5-1 tablets (0.25-0.5 mg) by mouth every 8 hours as needed for anxiety    Anxiety       sertraline 50 MG tablet    ZOLOFT    135 tablet    Take 1.5 tablets (75 mg) by mouth daily    Anxiety, Major depressive disorder, single episode, moderate (H)       spironolactone 50 MG tablet    ALDACTONE    180 tablet    Take 2 tablets (100 mg) by mouth daily    Edema of both legs       traZODone 50 MG tablet    DESYREL    180 tablet    Take 2 tablets (100 mg) by mouth nightly as needed for sleep    Primary insomnia

## 2018-11-27 LAB
ANION GAP SERPL CALCULATED.3IONS-SCNC: 7 MMOL/L (ref 3–14)
BUN SERPL-MCNC: 12 MG/DL (ref 7–30)
CALCIUM SERPL-MCNC: 9.1 MG/DL (ref 8.5–10.1)
CHLORIDE SERPL-SCNC: 102 MMOL/L (ref 94–109)
CO2 SERPL-SCNC: 28 MMOL/L (ref 20–32)
CREAT SERPL-MCNC: 1.02 MG/DL (ref 0.52–1.04)
GFR SERPL CREATININE-BSD FRML MDRD: 57 ML/MIN/1.7M2
GLUCOSE SERPL-MCNC: 82 MG/DL (ref 70–99)
POTASSIUM SERPL-SCNC: 3.5 MMOL/L (ref 3.4–5.3)
SODIUM SERPL-SCNC: 137 MMOL/L (ref 133–144)
T4 FREE SERPL-MCNC: 1.1 NG/DL (ref 0.76–1.46)
TSH SERPL DL<=0.005 MIU/L-ACNC: 5.44 MU/L (ref 0.4–4)

## 2018-11-27 RX ORDER — LEVOTHYROXINE SODIUM 175 UG/1
175 TABLET ORAL DAILY
Qty: 90 TABLET | Refills: 1 | Status: SHIPPED | OUTPATIENT
Start: 2018-11-27 | End: 2019-01-06

## 2018-11-27 ASSESSMENT — ANXIETY QUESTIONNAIRES: GAD7 TOTAL SCORE: 15

## 2019-01-04 ENCOUNTER — OFFICE VISIT (OUTPATIENT)
Dept: PEDIATRICS | Facility: CLINIC | Age: 54
End: 2019-01-04
Payer: COMMERCIAL

## 2019-01-04 VITALS
OXYGEN SATURATION: 97 % | BODY MASS INDEX: 39.53 KG/M2 | WEIGHT: 246 LBS | HEIGHT: 66 IN | DIASTOLIC BLOOD PRESSURE: 72 MMHG | HEART RATE: 80 BPM | TEMPERATURE: 97.4 F | SYSTOLIC BLOOD PRESSURE: 110 MMHG

## 2019-01-04 DIAGNOSIS — R60.0 EDEMA OF BOTH LEGS: ICD-10-CM

## 2019-01-04 DIAGNOSIS — Z13.220 SCREENING CHOLESTEROL LEVEL: ICD-10-CM

## 2019-01-04 DIAGNOSIS — E66.01 MORBID OBESITY (H): ICD-10-CM

## 2019-01-04 DIAGNOSIS — E06.3 HYPOTHYROIDISM DUE TO HASHIMOTO'S THYROIDITIS: ICD-10-CM

## 2019-01-04 DIAGNOSIS — R10.11 RUQ ABDOMINAL PAIN: ICD-10-CM

## 2019-01-04 DIAGNOSIS — F51.01 PRIMARY INSOMNIA: ICD-10-CM

## 2019-01-04 DIAGNOSIS — Z23 NEED FOR SHINGLES VACCINE: ICD-10-CM

## 2019-01-04 DIAGNOSIS — L82.1 SEBORRHEIC KERATOSIS: ICD-10-CM

## 2019-01-04 DIAGNOSIS — F41.9 ANXIETY: ICD-10-CM

## 2019-01-04 DIAGNOSIS — Z01.419 ENCOUNTER FOR GYNECOLOGICAL EXAMINATION WITHOUT ABNORMAL FINDING: Primary | ICD-10-CM

## 2019-01-04 DIAGNOSIS — M19.90 INFLAMMATORY ARTHRITIS: ICD-10-CM

## 2019-01-04 DIAGNOSIS — L98.9 SKIN LESION: ICD-10-CM

## 2019-01-04 DIAGNOSIS — Z12.4 SCREENING FOR CERVICAL CANCER: ICD-10-CM

## 2019-01-04 DIAGNOSIS — M77.51 BURSITIS OF RIGHT ANKLE: ICD-10-CM

## 2019-01-04 DIAGNOSIS — F32.1 MAJOR DEPRESSIVE DISORDER, SINGLE EPISODE, MODERATE (H): ICD-10-CM

## 2019-01-04 LAB
ALBUMIN SERPL-MCNC: 3.9 G/DL (ref 3.4–5)
ALP SERPL-CCNC: 55 U/L (ref 40–150)
ALT SERPL W P-5'-P-CCNC: 31 U/L (ref 0–50)
AST SERPL W P-5'-P-CCNC: 22 U/L (ref 0–45)
BILIRUB DIRECT SERPL-MCNC: 0.1 MG/DL (ref 0–0.2)
BILIRUB SERPL-MCNC: 0.4 MG/DL (ref 0.2–1.3)
CHOLEST SERPL-MCNC: 222 MG/DL
HDLC SERPL-MCNC: 54 MG/DL
LDLC SERPL CALC-MCNC: 148 MG/DL
NONHDLC SERPL-MCNC: 168 MG/DL
PROT SERPL-MCNC: 7.2 G/DL (ref 6.8–8.8)
T4 FREE SERPL-MCNC: 1.27 NG/DL (ref 0.76–1.46)
TRIGL SERPL-MCNC: 100 MG/DL
TSH SERPL DL<=0.005 MIU/L-ACNC: 5.68 MU/L (ref 0.4–4)

## 2019-01-04 PROCEDURE — 90715 TDAP VACCINE 7 YRS/> IM: CPT | Performed by: INTERNAL MEDICINE

## 2019-01-04 PROCEDURE — 90471 IMMUNIZATION ADMIN: CPT | Performed by: INTERNAL MEDICINE

## 2019-01-04 PROCEDURE — 80061 LIPID PANEL: CPT | Performed by: INTERNAL MEDICINE

## 2019-01-04 PROCEDURE — 84443 ASSAY THYROID STIM HORMONE: CPT | Performed by: INTERNAL MEDICINE

## 2019-01-04 PROCEDURE — 87624 HPV HI-RISK TYP POOLED RSLT: CPT | Performed by: INTERNAL MEDICINE

## 2019-01-04 PROCEDURE — 99214 OFFICE O/P EST MOD 30 MIN: CPT | Mod: 25 | Performed by: INTERNAL MEDICINE

## 2019-01-04 PROCEDURE — 99396 PREV VISIT EST AGE 40-64: CPT | Mod: 25 | Performed by: INTERNAL MEDICINE

## 2019-01-04 PROCEDURE — 90750 HZV VACC RECOMBINANT IM: CPT | Performed by: INTERNAL MEDICINE

## 2019-01-04 PROCEDURE — 36415 COLL VENOUS BLD VENIPUNCTURE: CPT | Performed by: INTERNAL MEDICINE

## 2019-01-04 PROCEDURE — 84439 ASSAY OF FREE THYROXINE: CPT | Performed by: INTERNAL MEDICINE

## 2019-01-04 PROCEDURE — 90472 IMMUNIZATION ADMIN EACH ADD: CPT | Performed by: INTERNAL MEDICINE

## 2019-01-04 PROCEDURE — G0145 SCR C/V CYTO,THINLAYER,RESCR: HCPCS | Performed by: INTERNAL MEDICINE

## 2019-01-04 PROCEDURE — 80076 HEPATIC FUNCTION PANEL: CPT | Performed by: INTERNAL MEDICINE

## 2019-01-04 RX ORDER — METHYLPREDNISOLONE 4 MG
TABLET, DOSE PACK ORAL
Qty: 21 TABLET | Refills: 0 | Status: SHIPPED | OUTPATIENT
Start: 2019-01-04 | End: 2019-10-27

## 2019-01-04 RX ORDER — LORAZEPAM 0.5 MG/1
.25-.5 TABLET ORAL EVERY 8 HOURS PRN
Qty: 20 TABLET | Refills: 0 | Status: SHIPPED | OUTPATIENT
Start: 2019-01-04 | End: 2019-04-10

## 2019-01-04 RX ORDER — ESCITALOPRAM OXALATE 10 MG/1
10 TABLET ORAL DAILY
Qty: 90 TABLET | Refills: 1 | COMMUNITY
Start: 2019-01-04 | End: 2019-07-26

## 2019-01-04 RX ORDER — HYDROCHLOROTHIAZIDE 25 MG/1
25 TABLET ORAL DAILY
Qty: 90 TABLET | Refills: 3 | Status: SHIPPED | OUTPATIENT
Start: 2019-01-04 | End: 2019-10-27

## 2019-01-04 RX ORDER — ESCITALOPRAM OXALATE 10 MG/1
10 TABLET ORAL DAILY
Qty: 90 TABLET | Refills: 1 | Status: SHIPPED | OUTPATIENT
Start: 2019-01-04 | End: 2019-01-04

## 2019-01-04 RX ORDER — SPIRONOLACTONE 50 MG/1
100 TABLET, FILM COATED ORAL DAILY
Qty: 180 TABLET | Refills: 3 | Status: SHIPPED | OUTPATIENT
Start: 2019-01-04 | End: 2019-10-27

## 2019-01-04 RX ORDER — TRAZODONE HYDROCHLORIDE 50 MG/1
100 TABLET, FILM COATED ORAL
Qty: 180 TABLET | Refills: 3 | Status: SHIPPED | OUTPATIENT
Start: 2019-01-04 | End: 2019-10-27

## 2019-01-04 ASSESSMENT — ENCOUNTER SYMPTOMS
FREQUENCY: 0
DIZZINESS: 0
MYALGIAS: 0
SHORTNESS OF BREATH: 0
HEMATOCHEZIA: 0
CONSTIPATION: 0
SORE THROAT: 0
WEAKNESS: 0
HEADACHES: 0
DIARRHEA: 0
ABDOMINAL PAIN: 1
CHILLS: 0
COUGH: 0
PARESTHESIAS: 0
NERVOUS/ANXIOUS: 1
EYE PAIN: 0
JOINT SWELLING: 1
FEVER: 0
BREAST MASS: 0
HEMATURIA: 0
PALPITATIONS: 0
ARTHRALGIAS: 1
HEARTBURN: 0
NAUSEA: 0
DYSURIA: 0

## 2019-01-04 ASSESSMENT — ANXIETY QUESTIONNAIRES
5. BEING SO RESTLESS THAT IT IS HARD TO SIT STILL: NOT AT ALL
2. NOT BEING ABLE TO STOP OR CONTROL WORRYING: SEVERAL DAYS
6. BECOMING EASILY ANNOYED OR IRRITABLE: NOT AT ALL
GAD7 TOTAL SCORE: 5
1. FEELING NERVOUS, ANXIOUS, OR ON EDGE: MORE THAN HALF THE DAYS
7. FEELING AFRAID AS IF SOMETHING AWFUL MIGHT HAPPEN: SEVERAL DAYS
3. WORRYING TOO MUCH ABOUT DIFFERENT THINGS: SEVERAL DAYS

## 2019-01-04 ASSESSMENT — PATIENT HEALTH QUESTIONNAIRE - PHQ9
SUM OF ALL RESPONSES TO PHQ QUESTIONS 1-9: 3
5. POOR APPETITE OR OVEREATING: NOT AT ALL

## 2019-01-04 ASSESSMENT — MIFFLIN-ST. JEOR: SCORE: 1741.57

## 2019-01-04 NOTE — PROGRESS NOTES
SUBJECTIVE:   CC: Amita Finley is an 53 year old woman who presents for preventive health visit.     Physical   Annual:     Getting at least 3 servings of Calcium per day:  Yes    Bi-annual eye exam:  Yes    Dental care twice a year:  Yes    Sleep apnea or symptoms of sleep apnea:  None    Diet:  Regular (no restrictions)    Frequency of exercise:  1 day/week    Duration of exercise:  Less than 15 minutes    Taking medications regularly:  Yes    Medication side effects:  None    Additional concerns today:  No    PHQ-2 Total Score: 1    Anxiety/depression: Started on lexapro 5 mg on 11/26. Took 5 mg dose for about a week, then increased to 10 mg. Feels a little calmer with the medication. No nausea with starting as had with sertraline. No other side effects. Feels like this is a good dose.    Inflammatory arthritis: Hand joints have been bad - 5th MCP in particular. Especially when wakes up - pain pretty severe. Also in right elbow. Gets better as the day goes on, but never goes away. Has been gradually worsening over last 6 months. Has not yet scheduled with Rheumatology. Would like to a burst of steroids to see if she can calm it down. Has done both medrol dose pack and prednisone in the past. Feels like the medrol dose pack is easier.    ?gallstones - having RUQ pain up under rib. Fairly continuously. Worse with eating. Tried taking omeprazole for a couple of weeks and did no help. Present for last few months. Initially was very intermittent. Gradually worsening. Worse over the holidays, worse if bends forward. Would like to check an US.    Skin lesions: multiple lesions. Would like to get a skin check. Has numerous light tan lesions on legs.     Edema: controlled on hydrochlorothiazide and spironolactone. Still gets worse as the day goes on.    Hypothyroidism: TSH still mildly when checked in November. Increased dose to 175 mcg once a day.    PHQ-9 SCORE 2/9/2018 11/26/2018 1/4/2019   PHQ-9 Total Score  [employer] - - -   PHQ-9 Total Score 8 8 3     BROOKS-7 SCORE 2018   Total Score 12 15 5     Today's PHQ-2 Score:   PHQ-2 (  [employer]) 2019   Q1: Little interest or pleasure in doing things 0   Q2: Feeling down, depressed or hopeless 1   PHQ-2 Score 1   Q1: Little interest or pleasure in doing things Not at all   Q2: Feeling down, depressed or hopeless Several days   PHQ-2 Score 1     Abuse: Current or Past(Physical, Sexual or Emotional)- No  Do you feel safe in your environment? Yes    Social History     Tobacco Use     Smoking status: Former Smoker     Packs/day: 1.50     Years: 20.00     Pack years: 30.00     Types: Cigarettes     Last attempt to quit: 1996     Years since quittin.0     Smokeless tobacco: Never Used   Substance Use Topics     Alcohol use: Yes     Alcohol/week: 1.8 oz     Types: 3 Standard drinks or equivalent per week     Alcohol Use 2019   If you drink alcohol do you typically have greater than 3 drinks per day OR greater than 7 drinks per week? No     Reviewed orders with patient.  Reviewed health maintenance and updated orders accordingly - Yes  Patient Active Problem List   Diagnosis     Other acne     Hypothyroidism     Lump or mass in breast     Rosacea     CARDIOVASCULAR SCREENING; LDL GOAL LESS THAN 160     Antiphospholipid antibody positive     Inflammatory arthritis     Back pain     Edema of both legs     Anxiety     Major depressive disorder, single episode, moderate (H)     Dry eye     Obesity (BMI 35.0-39.9) with comorbidity (H)     Past Surgical History:   Procedure Laterality Date     Novasure endometrial ablation  10/2007       Social History     Tobacco Use     Smoking status: Former Smoker     Packs/day: 1.50     Years: 20.00     Pack years: 30.00     Types: Cigarettes     Last attempt to quit: 1996     Years since quittin.0     Smokeless tobacco: Never Used   Substance Use Topics     Alcohol use: Yes     Alcohol/week: 1.8 oz      Types: 3 Standard drinks or equivalent per week     Family History   Problem Relation Age of Onset     Gastrointestinal Disease Mother         in 60's     Hypertension Mother      C.A.D. Father      Diabetes Father      Cardiovascular Father         First MI age 66     Glaucoma Father      Hypertension Father      Cancer Maternal Grandmother      Gastrointestinal Disease Sister         in 30's     Thyroid Disease Daughter      Macular Degeneration No family hx of          Mammogram Screening: Patient over age 50, mutual decision to screen reflected in health maintenance.    Pertinent mammograms are reviewed under the imaging tab.  History of abnormal Pap smear: NO - age 30-65 PAP every 5 years with negative HPV co-testing recommended  PAP / HPV 2/26/2014 12/3/2010 8/21/2007   PAP NIL NIL OTHER-NIL EM>40     Reviewed and updated as needed this visit by clinical staff  Tobacco  Allergies  Meds  Problems  Med Hx  Surg Hx  Fam Hx       Reviewed and updated as needed this visit by Provider  Tobacco  Allergies  Meds  Problems  Med Hx  Surg Hx  Fam Hx        Review of Systems   Constitutional: Negative for chills and fever.   HENT: Positive for hearing loss. Negative for congestion, ear pain and sore throat.    Eyes: Negative for pain and visual disturbance.   Respiratory: Negative for cough and shortness of breath.    Cardiovascular: Positive for peripheral edema. Negative for chest pain and palpitations.   Gastrointestinal: Positive for abdominal pain. Negative for constipation, diarrhea, heartburn, hematochezia and nausea.   Breasts:  Negative for tenderness, breast mass and discharge.   Genitourinary: Negative for dysuria, frequency, genital sores, hematuria, pelvic pain, urgency, vaginal bleeding and vaginal discharge.   Musculoskeletal: Positive for arthralgias and joint swelling. Negative for myalgias.   Skin: Positive for rash.   Neurological: Negative for dizziness, weakness, headaches and  "paresthesias.   Psychiatric/Behavioral: Negative for mood changes. The patient is nervous/anxious.    All other systems reviewed and are negative.     OBJECTIVE:   /72   Pulse 80   Temp 97.4  F (36.3  C) (Oral)   Ht 1.683 m (5' 6.25\")   Wt 111.6 kg (246 lb)   SpO2 97%   BMI 39.41 kg/m    Physical Exam  GENERAL: healthy, alert and no distress  EYES: Eyes grossly normal to inspection, PERRL and conjunctivae and sclerae normal  HENT: ear canals and TM's normal, nose and mouth without ulcers or lesions  NECK: no adenopathy, no asymmetry, masses, or scars and thyroid normal to palpation  RESP: lungs clear to auscultation - no rales, rhonchi or wheezes  BREAST: deferred to recent mammogram  CV: regular rate and rhythm, normal S1 S2, no S3 or S4, no murmur, click or rub, no peripheral edema and peripheral pulses strong  ABDOMEN: soft, mild lateral RUQ pain with palpation, no hepatosplenomegaly, no masses and bowel sounds normal  MS: tenderness over 5th MCP joint on right hand. no gross musculoskeletal defects noted, no edema  SKIN: multiple slightly raised and a little scaly light tan colored mostly oval lesions on bilateral thighs. no suspicious lesions or rashes  NEURO: Normal strength and tone, mentation intact and speech normal  PSYCH: mentation appears normal, affect normal/bright    Diagnostic Test Results:  pending    ASSESSMENT/PLAN:   1. Encounter for gynecological examination without abnormal finding  Pap today, mammo UTD, colonoscopy scheduled next week  tdap and Shingrix today  Flu vaccine UTD  Check cholesterol    2. Anxiety  3. Major depressive disorder, single episode, moderate (H)  Improved. Continue escitalopram 10 mg daily. Small amount of lorazepam filled. Uses infrequently. Repeat PHQ9 in 6 months and f/u in 1 year if continues to be controlled.  - escitalopram (LEXAPRO) 10 MG tablet; Take 1 tablet (10 mg) by mouth daily For 1 week, then increase to 10 mg daily  Dispense: 90 tablet; Refill: " 1  - LORazepam (ATIVAN) 0.5 MG tablet; Take 0.5-1 tablets (0.25-0.5 mg) by mouth every 8 hours as needed for anxiety  Dispense: 20 tablet; Refill: 0    4. Morbid obesity (H)  BMI 40. Has struggled with this and was working with [employer] without results. Some resources given at last visit for other options.     5. RUQ abdominal pain  Concerning for gallbladder. Check hepatic panel today. Will get US.  - Hepatic panel  - US Abdomen Complete; Future    6. Inflammatory arthritis  5th MCP on right hand has been very uncomfortable and limiting activity. Plans to schedule with Rheumatology. Will use short steroid burst to try to calm symptoms as likely will be a few months until she can get in.  - methylPREDNISolone (MEDROL DOSEPAK) 4 MG tablet therapy pack; Follow Package Directions  Dispense: 21 tablet; Refill: 0    7. Skin lesion  Multiple lesions. Lesions on legs c/w SKs. Would like full skin check.  - DERMATOLOGY REFERRAL    8. Seborrheic keratosis  Multiple on legs. reassurance given.     9. Edema of both legs  Mostly controlled with spironolactone and hydrochlorothiazide. Weight loss also recommended.  - spironolactone (ALDACTONE) 50 MG tablet; Take 2 tablets (100 mg) by mouth daily  Dispense: 180 tablet; Refill: 3  - hydrochlorothiazide (HYDRODIURIL) 25 MG tablet; Take 1 tablet (25 mg) by mouth daily  Dispense: 90 tablet; Refill: 3    10. Primary insomnia  Controlled. Continue trazodone.  - traZODone (DESYREL) 50 MG tablet; Take 2 tablets (100 mg) by mouth nightly as needed for sleep  Dispense: 180 tablet; Refill: 3    11. Hypothyroidism due to Hashimoto's thyroiditis  Adjusted dose at last visit. Due for recheck.  - TSH  - T4, free    12. Bursitis of right ankle  Needs refill.  - diclofenac (VOLTAREN) 1 % topical gel; Apply 4 grams to ankle or 2 grams to hands four times daily using enclosed dosing card.  Dispense: 100 g; Refill: 1    13. Screening for cervical cancer  - Pap imaged thin layer screen with HPV -  "recommended age 30 - 65 years (select HPV order below)  - HPV High Risk Types DNA Cervical    14. Need for shingles vaccine  - ZOSTER VACCINE RECOMBINANT ADJUVANTED IM NJX    15. Screening cholesterol level  - Lipid panel reflex to direct LDL Fasting    COUNSELING:  Reviewed preventive health counseling, as reflected in patient instructions  Special attention given to:        Regular exercise       Healthy diet/nutrition       Immunizations    Vaccinated for: TDAP and Zoster         Colon cancer screening    BP Readings from Last 1 Encounters:   01/04/19 110/72     Estimated body mass index is 39.41 kg/m  as calculated from the following:    Height as of this encounter: 1.683 m (5' 6.25\").    Weight as of this encounter: 111.6 kg (246 lb).    Weight management plan: Discussed healthy diet and exercise guidelines     reports that she quit smoking about 23 years ago. Her smoking use included cigarettes. She has a 30.00 pack-year smoking history. she has never used smokeless tobacco.    Counseling Resources:  ATP IV Guidelines  Pooled Cohorts Equation Calculator  Breast Cancer Risk Calculator  FRAX Risk Assessment  ICSI Preventive Guidelines  Dietary Guidelines for Americans, 2010  USDA's MyPlate  ASA Prophylaxis  Lung CA Screening    Amanda Barros MD  [facility]  "

## 2019-01-04 NOTE — PATIENT INSTRUCTIONS
Preventive Health Recommendations  Female Ages 50 - 64    Yearly exam: See your health care provider every year in order to  o Review health changes.   o Discuss preventive care.    o Review your medicines if your doctor has prescribed any.      Get a Pap test every three years (unless you have an abnormal result and your provider advises testing more often).    If you get Pap tests with HPV test, you only need to test every 5 years, unless you have an abnormal result.     You do not need a Pap test if your uterus was removed (hysterectomy) and you have not had cancer.    You should be tested each year for STDs (sexually transmitted diseases) if you're at risk.     Have a mammogram every 1 to 2 years.    Have a colonoscopy at age 50, or have a yearly FIT test (stool test). These exams screen for colon cancer.      Have a cholesterol test every 5 years, or more often if advised.    Have a diabetes test (fasting glucose) every three years. If you are at risk for diabetes, you should have this test more often.     If you are at risk for osteoporosis (brittle bone disease), think about having a bone density scan (DEXA).    Shots: Get a flu shot each year. Get a tetanus shot every 10 years.    Nutrition:     Eat at least 5 servings of fruits and vegetables each day.    Eat whole-grain bread, whole-wheat pasta and brown rice instead of white grains and rice.    Get adequate Calcium and Vitamin D.     Lifestyle    Exercise at least 150 minutes a week (30 minutes a day, 5 days a week). This will help you control your weight and prevent disease.    Limit alcohol to one drink per day.    No smoking.     Wear sunscreen to prevent skin cancer.     See your dentist every six months for an exam and cleaning.    See your eye doctor every 1 to 2 years.  -----------------  1. Tetanus/whooping cough and Shingrix Shingles vaccines today  2. Pap testing with HPV  3. Refilled medications   4. Recheck thyroid levels, cholesterol, liver  function tests  5. Schedule with Dermatology - Dr. Borges - [phone]  6. Medrol dose pack for arthritis - schedule with Rheumatology  7. You will be contacted to set up the abdominal ultrasound  - For scheduling in the [facility] ([facility]) call [phone]  or   [phone]

## 2019-01-05 ASSESSMENT — ANXIETY QUESTIONNAIRES: GAD7 TOTAL SCORE: 5

## 2019-01-06 DIAGNOSIS — E06.3 HYPOTHYROIDISM DUE TO HASHIMOTO'S THYROIDITIS: Primary | ICD-10-CM

## 2019-01-06 RX ORDER — LEVOTHYROXINE SODIUM 200 UG/1
200 TABLET ORAL DAILY
Qty: 90 TABLET | Refills: 3 | Status: SHIPPED | OUTPATIENT
Start: 2019-01-06 | End: 2019-07-28

## 2019-01-08 LAB
COPATH REPORT: NORMAL
PAP: NORMAL

## 2019-01-10 LAB
FINAL DIAGNOSIS: NORMAL
HPV HR 12 DNA CVX QL NAA+PROBE: NEGATIVE
HPV16 DNA SPEC QL NAA+PROBE: NEGATIVE
HPV18 DNA SPEC QL NAA+PROBE: NEGATIVE
SPECIMEN DESCRIPTION: NORMAL
SPECIMEN SOURCE CVX/VAG CYTO: NORMAL

## 2019-01-11 ENCOUNTER — TRANSFERRED RECORDS (OUTPATIENT)
Dept: HEALTH INFORMATION MANAGEMENT | Facility: CLINIC | Age: 54
End: 2019-01-11

## 2019-04-10 DIAGNOSIS — F41.9 ANXIETY: ICD-10-CM

## 2019-04-11 NOTE — TELEPHONE ENCOUNTER
Requested Prescriptions   Pending Prescriptions Disp Refills     LORazepam (ATIVAN) 0.5 MG tablet [Pharmacy Med Name: LORAZEPAM 0.5MG TABS]  Last Written Prescription Date:  01/04/2019  Last Fill Quantity: 20 tablet,  # refills: 0    Last office visit: 1/4/2019 with prescribing provider:  Amanda Barros MD        Future Office Visit:     20 tablet 0     Sig: TAKE ONE-HALF TO ONE TABLET BY MOUTH EVERY 8 HOURS AS NEEDED FOR ANXIETY       There is no refill protocol information for this order

## 2019-04-12 RX ORDER — LORAZEPAM 0.5 MG/1
TABLET ORAL
Qty: 20 TABLET | Refills: 0 | Status: SHIPPED | OUTPATIENT
Start: 2019-04-12 | End: 2019-12-06

## 2019-04-12 NOTE — TELEPHONE ENCOUNTER
checked-last filled 1/5/19.  No concerns.    Routing refill request to provider for review/approval because:  Drug not on the [facility] refill protocol.    Catherine Martínez RN - [facility]

## 2019-04-16 ENCOUNTER — MYC REFILL (OUTPATIENT)
Dept: PEDIATRICS | Facility: CLINIC | Age: 54
End: 2019-04-16

## 2019-04-16 DIAGNOSIS — R60.0 EDEMA OF BOTH LEGS: ICD-10-CM

## 2019-04-17 RX ORDER — SPIRONOLACTONE 50 MG/1
100 TABLET, FILM COATED ORAL DAILY
Qty: 180 TABLET | Refills: 3 | OUTPATIENT
Start: 2019-04-17

## 2019-04-17 NOTE — TELEPHONE ENCOUNTER
Pt should have refills on file.  Original rx was sent on 1/4/19 for 90 day supply with 3 refills to requesting pharmacy.    Catherine Martínez RN - [facility]

## 2019-05-10 NOTE — PROGRESS NOTES
[facility]umatology Clinic Visit     Amita Finley MRN# [id number]   YOB: 1965    Primary care provider: Amanda Barros  Jun 7, 2019          Assessment and Plan:   # Inflammatory arthritis  # H/o antiphospholipid antibody syndrome with recurrent miscarriages    Liver tests within normal limits    Rheumatoid arthritis. Further evaluation needed. Will need hand xray next visit.     We will do the following work up. F/u with rheumatology in few weeks to review the lab results and discuss management. In the meantime, we will try a prednisone course to see the response. Side effects discussed. Also start plaquenil which was recommended last visit with rheumatology several years ago.     Discussed with the patient regarding benefits versus risks of plaquenil therapy.  Discussed that plaquenil can cause in some people eye toxicity.   Needs baseline eye exam by ophthalmologist. Recommend periodic eye exams.   Discussed that it would take about >3 to get maximum efficacy of plaquenil.    The labs from patient records are reviewed.     I will be back in touch with the patient through mychart/letter when results are available.     Patient agrees with the above mentioned treatment plan.     Most Recent Immunizations   Administered Date(s) Administered     DTaP, Unspecified 02/11/2009     Influenza (H1N1) 11/24/2009     Influenza (IIV3) PF 12/26/2013     Influenza Vaccine IM 3yrs+ 4 Valent IIV4 10/23/2018     Mantoux Tuberculin Skin Test 06/21/2013     TD (ADULT, 7+) 06/09/2005     TDAP Vaccine (Adacel) 01/04/2019     Zoster vaccine recombinant adjuvanted (SHINGRIX) 01/04/2019       Orders Placed This Encounter   Procedures     CBC with platelets differential     Creatinine     Erythrocyte sedimentation rate auto     CRP inflammation     MARIO antibody panel     DNA double stranded antibodies     Rheumatoid factor     Cyclic Citrullinated Peptide Antibody IgG     Uric acid     Lupus Anticoagulant  Panel     Cardiolipin Radha IgG and IgM     Beta 2 Glycoprotein 1 Antibody IgG     Anti Nuclear Radha IgG by IFA with Reflex     TSH with free T4 reflex     RHEUMATOLOGY REFERRAL       No follow-ups on file.    There are no discontinued medications.  Current Outpatient Medications   Medication Sig Dispense Refill     diclofenac (VOLTAREN) 1 % topical gel Apply 4 grams to ankle or 2 grams to hands four times daily using enclosed dosing card. 100 g 1     escitalopram (LEXAPRO) 10 MG tablet Take 1 tablet (10 mg) by mouth daily 90 tablet 1     hydrochlorothiazide (HYDRODIURIL) 25 MG tablet Take 1 tablet (25 mg) by mouth daily 90 tablet 3     hydroxychloroquine (PLAQUENIL) 200 MG tablet Take 1 tablet (200 mg) by mouth 2 times daily Annual Plaquenil toxicity eye screening required. 180 tablet 1     ibuprofen (ADVIL/MOTRIN) 800 MG tablet Take 1 tablet (800 mg) by mouth every 6 hours as needed for moderate pain 20 tablet 0     levothyroxine (SYNTHROID/LEVOTHROID) 200 MCG tablet Take 1 tablet (200 mcg) by mouth daily 90 tablet 3     predniSONE (DELTASONE) 5 MG tablet Take 15 mg PO daily X 15 days 45 tablet 0     spironolactone (ALDACTONE) 50 MG tablet Take 2 tablets (100 mg) by mouth daily 180 tablet 3     traZODone (DESYREL) 50 MG tablet Take 2 tablets (100 mg) by mouth nightly as needed for sleep 180 tablet 3     hydrOXYzine (ATARAX) 25 MG tablet TAKE 1-2 TABLET(S) BY MOUTH EVERY 6 HOURS AS NEEDED FOR ANXIETY (Patient not taking: Reported on 6/7/2019) 60 tablet 1     LORazepam (ATIVAN) 0.5 MG tablet TAKE ONE-HALF TO ONE TABLET BY MOUTH EVERY 8 HOURS AS NEEDED FOR ANXIETY (Patient not taking: Reported on 6/7/2019) 20 tablet 0     methylPREDNISolone (MEDROL DOSEPAK) 4 MG tablet therapy pack Follow Package Directions (Patient not taking: Reported on 6/7/2019) 21 tablet 0       Mark Torres MD  [facility]          Active Problem List:     Patient Active Problem List    Diagnosis Date Noted     Dry eye  11/26/2018     Priority: Medium     Obesity (BMI 35.0-39.9) with comorbidity (H) 11/26/2018     Priority: Medium     Anxiety 02/21/2017     Priority: Medium     Major depressive disorder, single episode, moderate (H) 02/21/2017     Priority: Medium     Edema of both legs 06/21/2013     Priority: Medium     Back pain 06/17/2013     Priority: Medium     Antiphospholipid antibody positive 12/14/2011     Priority: Medium     Taking daily ASA         Inflammatory arthritis 12/14/2011     Priority: Medium     Followed by Rheum. Better on prednisone. RF positive, all other testing negative.       CARDIOVASCULAR SCREENING; LDL GOAL LESS THAN 160 02/10/2010     Priority: Medium     Rosacea 08/20/2008     Priority: Medium     Lump or mass in breast 10/31/2007     Priority: Medium     Hypothyroidism      Priority: Medium     Other acne 06/09/2005     Priority: Medium            History of Present Illness:     Chief Complaint   Patient presents with     Consult       May 10, 2019  Have you ever seen a rheumatologist Yes Who Dr. Ritchie [facility] ago   Joint pain history  Onset: diagnosed for 10+ years but worsened in last 3 months   Involved joints: Both pointers on both hands, middle fingers and pinky, elbows, great toe on left foot and forefoot of right, and neck   Pain scale:  5/10     Wakes the patient from sleep : No  Morning stiffness:Yes for 5-120 minutes  Meds used:Diclofenac gel not effective, Ibuprofen slightly effective but causes swelling, Naproxen not effective causes stomach, Medrol Dosepak and Prednisone which are effective, Tried CBD cream doesn't work, Tylenol      Interim history  Since last visit:  1. Infections - No  2. New symptoms/medical problem - No  3. Any side effects from Rheum medications -swelling and stomach with past treatment   3. ER visits/Hospitalizations/surgeries - No  4. Last PCP visit: 1/4/19      Wt Readings from Last 4 Encounters:   06/07/19 112.6 kg (248 lb 4.8  oz)   19 111.6 kg (246 lb)   18 110 kg (242 lb 6.4 oz)   18 109.7 kg (241 lb 12.8 oz)       Fatigue-     /10    No family or personal history of psoriasis, ulcerative colitis or chron's disease. .  Patient denies any raynauds, malar rash, skin photosensitivity  No h/o persistent shortness of breath, cough, chest pain  No h/o persistent vomiting, diarrhea, abdominal pain  No h/o hematochezia, hematuria, hemoptysis  No h/o seizures   No h/o cancer    BP Readings from Last 3 Encounters:   19 106/70   19 110/72   18 110/68              Review of Systems:   Complete ROS negative except for symptoms mentioned in the HPI          Past Medical History:     Past Medical History:   Diagnosis Date     Antiphospholipid antibodies positive 2011     Arthritis      Baker's cyst of knee      Hypothyroidism      Obesity      Past Surgical History:   Procedure Laterality Date     Novasure endometrial ablation  10/2007            Social History:     Social History     Occupational History     Occupation: nurse     Employer: [employer]     Comment: [employer]   Tobacco Use     Smoking status: Former Smoker     Packs/day: 1.50     Years: 20.00     Pack years: 30.00     Types: Cigarettes     Last attempt to quit: 1996     Years since quittin.4     Smokeless tobacco: Never Used   Substance and Sexual Activity     Alcohol use: Yes     Alcohol/week: 1.8 oz     Types: 3 Standard drinks or equivalent per week     Drug use: No     Sexual activity: Yes     Partners: Male     Birth control/protection: Surgical            Family History:     Family History   Problem Relation Age of Onset     Gastrointestinal Disease Mother         in 60's     Hypertension Mother      C.A.D. Father      Diabetes Father      Cardiovascular Father         First MI age 66     Glaucoma Father      Hypertension Father      Cancer Maternal Grandmother      Gastrointestinal Disease Sister         in  30's     Thyroid Disease Daughter      Macular Degeneration No family hx of             Allergies:     Allergies   Allergen Reactions     Ciprofloxacin Other (See Comments) and Anaphylaxis     Tongue swelling     Augmentin Other (See Comments)     Burning sensation on hands            Medications:     Current Outpatient Medications   Medication Sig Dispense Refill     diclofenac (VOLTAREN) 1 % topical gel Apply 4 grams to ankle or 2 grams to hands four times daily using enclosed dosing card. 100 g 1     escitalopram (LEXAPRO) 10 MG tablet Take 1 tablet (10 mg) by mouth daily 90 tablet 1     hydrochlorothiazide (HYDRODIURIL) 25 MG tablet Take 1 tablet (25 mg) by mouth daily 90 tablet 3     hydroxychloroquine (PLAQUENIL) 200 MG tablet Take 1 tablet (200 mg) by mouth 2 times daily Annual Plaquenil toxicity eye screening required. 180 tablet 1     ibuprofen (ADVIL/MOTRIN) 800 MG tablet Take 1 tablet (800 mg) by mouth every 6 hours as needed for moderate pain 20 tablet 0     levothyroxine (SYNTHROID/LEVOTHROID) 200 MCG tablet Take 1 tablet (200 mcg) by mouth daily 90 tablet 3     predniSONE (DELTASONE) 5 MG tablet Take 15 mg PO daily X 15 days 45 tablet 0     spironolactone (ALDACTONE) 50 MG tablet Take 2 tablets (100 mg) by mouth daily 180 tablet 3     traZODone (DESYREL) 50 MG tablet Take 2 tablets (100 mg) by mouth nightly as needed for sleep 180 tablet 3     hydrOXYzine (ATARAX) 25 MG tablet TAKE 1-2 TABLET(S) BY MOUTH EVERY 6 HOURS AS NEEDED FOR ANXIETY (Patient not taking: Reported on 6/7/2019) 60 tablet 1     LORazepam (ATIVAN) 0.5 MG tablet TAKE ONE-HALF TO ONE TABLET BY MOUTH EVERY 8 HOURS AS NEEDED FOR ANXIETY (Patient not taking: Reported on 6/7/2019) 20 tablet 0     methylPREDNISolone (MEDROL DOSEPAK) 4 MG tablet therapy pack Follow Package Directions (Patient not taking: Reported on 6/7/2019) 21 tablet 0            Physical Exam:   Blood pressure 106/70, pulse 80, temperature 98  F (36.7  C), temperature  "source Oral, height 1.662 m (5' 5.43\"), weight 112.6 kg (248 lb 4.8 oz), SpO2 98 %, not currently breastfeeding.  Wt Readings from Last 4 Encounters:   06/07/19 112.6 kg (248 lb 4.8 oz)   01/04/19 111.6 kg (246 lb)   11/26/18 110 kg (242 lb 6.4 oz)   02/09/18 109.7 kg (241 lb 12.8 oz)       Constitutional: well-developed, appearing stated age; cooperative  Eyes: normal conjunctiva, sclera  ENT: nl external ears, nose, lips.No mucous membrane lesions, normal saliva pool  Neck: no cervical lymphadenopathy  Resp: lungs clear to auscultation in the bases,   CV: RRR, no added sounds  GI: Abdomen soft and no tenderness  : not tested  Lymph: no cervical, supraclavicular or epitrochlear nodes  MS: Subtle synovitis noted in 2nd and 3rd MCPs and PIPs bilaterally. Fist 100% bilaterally.    All shoulder, elbow, wrist,  hip, knee, ankle, and foot MTP/IP joints were examined and  found without active synovitis or major deformity. Full ROM.  No dactylitis,  tenosynovitis, enthesopathy.  Skin: no rash in exposed areas  Psych: nl judgement, orientation, memory, affect.         Data:         Mark Torres MD    [facility]    "

## 2019-06-07 ENCOUNTER — OFFICE VISIT (OUTPATIENT)
Dept: RHEUMATOLOGY | Facility: CLINIC | Age: 54
End: 2019-06-07
Attending: INTERNAL MEDICINE
Payer: COMMERCIAL

## 2019-06-07 VITALS
SYSTOLIC BLOOD PRESSURE: 106 MMHG | OXYGEN SATURATION: 98 % | TEMPERATURE: 98 F | WEIGHT: 248.3 LBS | HEIGHT: 65 IN | DIASTOLIC BLOOD PRESSURE: 70 MMHG | HEART RATE: 80 BPM | BODY MASS INDEX: 41.37 KG/M2

## 2019-06-07 DIAGNOSIS — M19.90 INFLAMMATORY ARTHRITIS: Primary | ICD-10-CM

## 2019-06-07 DIAGNOSIS — M06.9 RHEUMATOID ARTHRITIS, INVOLVING UNSPECIFIED SITE, UNSPECIFIED RHEUMATOID FACTOR PRESENCE: ICD-10-CM

## 2019-06-07 DIAGNOSIS — Z86.2 HX OF LUPUS ANTICOAGULANT DISORDER: ICD-10-CM

## 2019-06-07 LAB
BASOPHILS # BLD AUTO: 0 10E9/L (ref 0–0.2)
BASOPHILS NFR BLD AUTO: 0.3 %
DIFFERENTIAL METHOD BLD: NORMAL
EOSINOPHIL # BLD AUTO: 0.1 10E9/L (ref 0–0.7)
EOSINOPHIL NFR BLD AUTO: 1.9 %
ERYTHROCYTE [DISTWIDTH] IN BLOOD BY AUTOMATED COUNT: 13.4 % (ref 10–15)
ERYTHROCYTE [SEDIMENTATION RATE] IN BLOOD BY WESTERGREN METHOD: 9 MM/H (ref 0–30)
HCT VFR BLD AUTO: 42.7 % (ref 35–47)
HGB BLD-MCNC: 14.3 G/DL (ref 11.7–15.7)
LYMPHOCYTES # BLD AUTO: 2.2 10E9/L (ref 0.8–5.3)
LYMPHOCYTES NFR BLD AUTO: 28.5 %
MCH RBC QN AUTO: 28.1 PG (ref 26.5–33)
MCHC RBC AUTO-ENTMCNC: 33.5 G/DL (ref 31.5–36.5)
MCV RBC AUTO: 84 FL (ref 78–100)
MONOCYTES # BLD AUTO: 0.7 10E9/L (ref 0–1.3)
MONOCYTES NFR BLD AUTO: 8.9 %
NEUTROPHILS # BLD AUTO: 4.6 10E9/L (ref 1.6–8.3)
NEUTROPHILS NFR BLD AUTO: 60.4 %
PLATELET # BLD AUTO: 248 10E9/L (ref 150–450)
RBC # BLD AUTO: 5.09 10E12/L (ref 3.8–5.2)
WBC # BLD AUTO: 7.6 10E9/L (ref 4–11)

## 2019-06-07 PROCEDURE — 85730 THROMBOPLASTIN TIME PARTIAL: CPT | Performed by: INTERNAL MEDICINE

## 2019-06-07 PROCEDURE — 86146 BETA-2 GLYCOPROTEIN ANTIBODY: CPT | Performed by: INTERNAL MEDICINE

## 2019-06-07 PROCEDURE — 86140 C-REACTIVE PROTEIN: CPT | Performed by: INTERNAL MEDICINE

## 2019-06-07 PROCEDURE — 84443 ASSAY THYROID STIM HORMONE: CPT | Performed by: INTERNAL MEDICINE

## 2019-06-07 PROCEDURE — 00000401 ZZHCL STATISTIC THROMBIN TIME NC: Performed by: INTERNAL MEDICINE

## 2019-06-07 PROCEDURE — 86431 RHEUMATOID FACTOR QUANT: CPT | Performed by: INTERNAL MEDICINE

## 2019-06-07 PROCEDURE — 85613 RUSSELL VIPER VENOM DILUTED: CPT | Performed by: INTERNAL MEDICINE

## 2019-06-07 PROCEDURE — 00000167 ZZHCL STATISTIC INR NC: Performed by: INTERNAL MEDICINE

## 2019-06-07 PROCEDURE — 82565 ASSAY OF CREATININE: CPT | Performed by: INTERNAL MEDICINE

## 2019-06-07 PROCEDURE — 84550 ASSAY OF BLOOD/URIC ACID: CPT | Performed by: INTERNAL MEDICINE

## 2019-06-07 PROCEDURE — 86225 DNA ANTIBODY NATIVE: CPT | Performed by: INTERNAL MEDICINE

## 2019-06-07 PROCEDURE — 86147 CARDIOLIPIN ANTIBODY EA IG: CPT | Performed by: INTERNAL MEDICINE

## 2019-06-07 PROCEDURE — 86200 CCP ANTIBODY: CPT | Performed by: INTERNAL MEDICINE

## 2019-06-07 PROCEDURE — 36415 COLL VENOUS BLD VENIPUNCTURE: CPT | Performed by: INTERNAL MEDICINE

## 2019-06-07 PROCEDURE — 86038 ANTINUCLEAR ANTIBODIES: CPT | Performed by: INTERNAL MEDICINE

## 2019-06-07 PROCEDURE — 86235 NUCLEAR ANTIGEN ANTIBODY: CPT | Performed by: INTERNAL MEDICINE

## 2019-06-07 PROCEDURE — 85652 RBC SED RATE AUTOMATED: CPT | Performed by: INTERNAL MEDICINE

## 2019-06-07 PROCEDURE — 85025 COMPLETE CBC W/AUTO DIFF WBC: CPT | Performed by: INTERNAL MEDICINE

## 2019-06-07 PROCEDURE — 84439 ASSAY OF FREE THYROXINE: CPT | Performed by: INTERNAL MEDICINE

## 2019-06-07 PROCEDURE — 99204 OFFICE O/P NEW MOD 45 MIN: CPT | Performed by: INTERNAL MEDICINE

## 2019-06-07 RX ORDER — PREDNISONE 5 MG/1
TABLET ORAL
Qty: 45 TABLET | Refills: 0 | Status: SHIPPED | OUTPATIENT
Start: 2019-06-07 | End: 2019-10-27

## 2019-06-07 RX ORDER — HYDROXYCHLOROQUINE SULFATE 200 MG/1
200 TABLET, FILM COATED ORAL 2 TIMES DAILY
Qty: 180 TABLET | Refills: 1 | Status: SHIPPED | OUTPATIENT
Start: 2019-06-07 | End: 2020-03-23

## 2019-06-07 ASSESSMENT — MIFFLIN-ST. JEOR: SCORE: 1739.03

## 2019-06-07 ASSESSMENT — ROUTINE ASSESSMENT OF PATIENT INDEX DATA (RAPID3)
RAPID3 INTERPRETATION: HIGH > 12.0
TOTAL RAPID3 SCORE: 12.3

## 2019-06-07 NOTE — NURSING NOTE
"Chief Complaint   Patient presents with     Consult       Initial /70 (BP Location: Right arm, Patient Position: Chair, Cuff Size: Adult Large)   Pulse 80   Temp 98  F (36.7  C) (Oral)   Ht 1.662 m (5' 5.43\")   Wt 112.6 kg (248 lb 4.8 oz)   SpO2 98%   BMI 40.77 kg/m   Estimated body mass index is 40.77 kg/m  as calculated from the following:    Height as of this encounter: 1.662 m (5' 5.43\").    Weight as of this encounter: 112.6 kg (248 lb 4.8 oz).  Medication Reconciliation: complete    Have you ever seen a rheumatologist Yes Who Dr. iRtchie [facility] ago   Joint pain history  Onset: diagnosed for 10+ years but worsened in last 3 months   Involved joints: Both pointers on both hands, middle fingers and pinky, elbows, great toe on left foot and forefoot of right, and neck   Pain scale:  5/10     Wakes the patient from sleep : No  Morning stiffness:Yes for 5-120 minutes  Meds used:Diclofenac gel not effective, Ibuprofen slightly effective but causes swelling, Naproxen not effective causes stomach, Medrol Dosepak and Prednisone which are effective, Tried CBD cream doesn't work, Tylenol     Interim history  Since last visit:  1. Infections - No  2. New symptoms/medical problem - No  3. Any side effects from Rheum medications -swelling and stomach with past treatment   3. ER visits/Hospitalizations/surgeries - No  4. Last PCP visit: 1/4/19  Wt Readings from Last 4 Encounters:   06/07/19 112.6 kg (248 lb 4.8 oz)   01/04/19 111.6 kg (246 lb)   11/26/18 110 kg (242 lb 6.4 oz)   02/09/18 109.7 kg (241 lb 12.8 oz)     BP Readings from Last 3 Encounters:   06/07/19 106/70   01/04/19 110/72   11/26/18 110/68     "

## 2019-06-08 LAB
CREAT SERPL-MCNC: 0.87 MG/DL (ref 0.52–1.04)
CRP SERPL-MCNC: 6 MG/L (ref 0–8)
GFR SERPL CREATININE-BSD FRML MDRD: 76 ML/MIN/{1.73_M2}
T4 FREE SERPL-MCNC: 1.64 NG/DL (ref 0.76–1.46)
TSH SERPL DL<=0.005 MIU/L-ACNC: 0.04 MU/L (ref 0.4–4)
URATE SERPL-MCNC: 5.6 MG/DL (ref 2.6–6)

## 2019-06-09 LAB
CARDIOLIPIN ANTIBODY IGG: <1.6 GPL-U/ML (ref 0–19.9)
CARDIOLIPIN ANTIBODY IGM: 0.5 MPL-U/ML (ref 0–19.9)
ENA RNP IGG SER IA-ACNC: <0.2 AI (ref 0–0.9)
ENA SCL70 IGG SER IA-ACNC: 0.5 AI (ref 0–0.9)
ENA SM IGG SER-ACNC: <0.2 AI (ref 0–0.9)
ENA SS-A IGG SER IA-ACNC: <0.2 AI (ref 0–0.9)
ENA SS-B IGG SER IA-ACNC: <0.2 AI (ref 0–0.9)

## 2019-06-10 LAB
ANA SER QL IF: NEGATIVE
B2 GLYCOPROT1 IGG SERPL IA-ACNC: 0.7 U/ML
CCP AB SER IA-ACNC: 1 U/ML
DSDNA AB SER-ACNC: 1 IU/ML
RHEUMATOID FACT SER NEPH-ACNC: <20 IU/ML (ref 0–20)

## 2019-06-11 LAB — LA PPP-IMP: NEGATIVE

## 2019-06-28 NOTE — RESULT ENCOUNTER NOTE
[place]:  Dear Ms. Finley,  Your thyroid numbers are abnormal. Please discuss this with your primary care provider.   Inflammatory markers are normal.   Blood cell counts, kidney function within normal limits  Uric acid gout test within normal limits  Some of the specific antibodies for rheumatoid arthritis, lupus, mixed connective tissue disease, scleroderma, sjogrens syndrome are normal (negative).  Blood clotting antibodies is normal.         Sincerely    Mark Torres MD  [facility]

## 2019-07-10 DIAGNOSIS — F41.9 ANXIETY: ICD-10-CM

## 2019-07-10 DIAGNOSIS — F32.1 MAJOR DEPRESSIVE DISORDER, SINGLE EPISODE, MODERATE (H): ICD-10-CM

## 2019-07-10 RX ORDER — ESCITALOPRAM OXALATE 10 MG/1
TABLET ORAL
Qty: 30 TABLET | Refills: 0 | Status: SHIPPED | OUTPATIENT
Start: 2019-07-10 | End: 2019-07-28

## 2019-07-10 NOTE — TELEPHONE ENCOUNTER
"Requested Prescriptions   Pending Prescriptions Disp Refills     escitalopram (LEXAPRO) 10 MG tablet [Pharmacy Med Name: ESCITALOPRAM OXALATE 10MG TABS]    Last Written Prescription Date:  1/4/2019  Last Fill Quantity: 90,  # refills: 1   Last office visit: 1/4/2019 with prescribing provider:  Amanda Barros     Future Office Visit:     90 tablet 1     Sig: TAKE ONE TABLET BY MOUTH ONCE DAILY       SSRIs Protocol Failed - 7/10/2019  6:20 AM        Failed - PHQ-9 score less than 5 in past 6 months     Please review last PHQ-9 score.     PHQ-9 SCORE 2/9/2018 11/26/2018 1/4/2019   PHQ-9 Total Score MyChart - - -   PHQ-9 Total Score 8 8 3     BROOKS-7 SCORE 2/9/2018 11/26/2018 1/4/2019   Total Score 12 15 5           Failed - Recent (6 mo) or future (30 days) visit within the authorizing provider's specialty     Patient had office visit in the last 6 months or has a visit in the next 30 days with authorizing provider or within the authorizing provider's specialty.  See \"Patient Info\" tab in inbasket, or \"Choose Columns\" in Meds & Orders section of the refill encounter.            Passed - Medication is active on med list        Passed - Patient is age 18 or older        Passed - No active pregnancy on record        Passed - No positive pregnancy test in last 12 months      Medication is being filled for 1 time refill only due to:  Patient needs to be seen because 6 month follow up due.     Rachel Hutchins RN        "

## 2019-07-18 ENCOUNTER — OFFICE VISIT (OUTPATIENT)
Dept: URGENT CARE | Facility: URGENT CARE | Age: 54
End: 2019-07-18
Payer: COMMERCIAL

## 2019-07-18 VITALS
WEIGHT: 245 LBS | TEMPERATURE: 97.9 F | HEART RATE: 90 BPM | BODY MASS INDEX: 40.23 KG/M2 | RESPIRATION RATE: 18 BRPM | OXYGEN SATURATION: 98 % | DIASTOLIC BLOOD PRESSURE: 64 MMHG | SYSTOLIC BLOOD PRESSURE: 114 MMHG

## 2019-07-18 DIAGNOSIS — J01.90 ACUTE SINUSITIS WITH SYMPTOMS > 10 DAYS: ICD-10-CM

## 2019-07-18 DIAGNOSIS — J06.9 VIRAL URI WITH COUGH: Primary | ICD-10-CM

## 2019-07-18 PROCEDURE — 99214 OFFICE O/P EST MOD 30 MIN: CPT | Performed by: FAMILY MEDICINE

## 2019-07-18 RX ORDER — CODEINE PHOSPHATE AND GUAIFENESIN 10; 100 MG/5ML; MG/5ML
1-2 SOLUTION ORAL
Qty: 50 ML | Refills: 0 | Status: SHIPPED | OUTPATIENT
Start: 2019-07-18 | End: 2019-10-27

## 2019-07-18 RX ORDER — DOXYCYCLINE HYCLATE 100 MG
100 TABLET ORAL 2 TIMES DAILY
Qty: 14 TABLET | Refills: 0 | Status: SHIPPED | OUTPATIENT
Start: 2019-07-18 | End: 2019-10-27

## 2019-07-18 RX ORDER — BENZONATATE 100 MG/1
100 CAPSULE ORAL 3 TIMES DAILY PRN
Qty: 21 CAPSULE | Refills: 1 | Status: SHIPPED | OUTPATIENT
Start: 2019-07-18 | End: 2019-12-06

## 2019-07-18 NOTE — PROGRESS NOTES
Subjective:   Amita Finley is a 53 year old female who presents for   Chief Complaint   Patient presents with     Urgent Care     URI     cough, sinus congestion, fatigue, wheezing for 12 days      12 days of symptoms thought things were improving on day 6 but things have gotten worse.   Cough is most bothersome symptom. She does get warm at times.. Denies any recorded fevers.   She has slight left facial pressure. She has some nasal drainage. Lots of phlegm production  She does get sinus infections from time to time.     Meds attempted: fluticasone, nasal saline, sudafed, tylenol, ibuprofen, albuterol        Patient Active Problem List    Diagnosis Date Noted     Dry eye 11/26/2018     Priority: Medium     Obesity (BMI 35.0-39.9) with comorbidity (H) 11/26/2018     Priority: Medium     Anxiety 02/21/2017     Priority: Medium     Major depressive disorder, single episode, moderate (H) 02/21/2017     Priority: Medium     Edema of both legs 06/21/2013     Priority: Medium     Back pain 06/17/2013     Priority: Medium     Antiphospholipid antibody positive 12/14/2011     Priority: Medium     Taking daily ASA         Inflammatory arthritis 12/14/2011     Priority: Medium     Followed by Rheum. Better on prednisone. RF positive, all other testing negative.       CARDIOVASCULAR SCREENING; LDL GOAL LESS THAN 160 02/10/2010     Priority: Medium     Rosacea 08/20/2008     Priority: Medium     Lump or mass in breast 10/31/2007     Priority: Medium     Hypothyroidism      Priority: Medium     Other acne 06/09/2005     Priority: Medium       Current Outpatient Medications   Medication     benzonatate (TESSALON) 100 MG capsule     diclofenac (VOLTAREN) 1 % topical gel     doxycycline hyclate (VIBRA-TABS) 100 MG tablet     escitalopram (LEXAPRO) 10 MG tablet     escitalopram (LEXAPRO) 10 MG tablet     guaiFENesin-codeine (ROBITUSSIN AC) 100-10 MG/5ML solution     hydrochlorothiazide (HYDRODIURIL) 25 MG tablet      hydroxychloroquine (PLAQUENIL) 200 MG tablet     hydrOXYzine (ATARAX) 25 MG tablet     ibuprofen (ADVIL/MOTRIN) 800 MG tablet     levothyroxine (SYNTHROID/LEVOTHROID) 200 MCG tablet     LORazepam (ATIVAN) 0.5 MG tablet     methylPREDNISolone (MEDROL DOSEPAK) 4 MG tablet therapy pack     predniSONE (DELTASONE) 5 MG tablet     spironolactone (ALDACTONE) 50 MG tablet     traZODone (DESYREL) 50 MG tablet     No current facility-administered medications for this visit.      ROS:  As above per HPI    Objective:   /64 (BP Location: Right arm)   Pulse 90   Temp 97.9  F (36.6  C) (Tympanic)   Resp 18   Wt 111.1 kg (245 lb)   SpO2 98%   BMI 40.23 kg/m  , Body mass index is 40.23 kg/m .  Gen:  NAD, well-nourished, sitting in chair comfortably  HEENT: EOMI, sclera anicteric, Head normocephalic, ; nares patent; moist mucous membranes, normal tonsils bilaterally, no trismus  Neck: trachea midline, no thyromegaly  CV:  Hemodynamically stable, RRR  Pulm:  no increased work of breathing , CTAB, no wheezes/rales/rhonchi   Extrem: no cyanosis, edema or clubbing  Skin: no obvious rashes or abnormalities  Psych: Euthymic, linear thoughts, normal rate of speech    Assessment & Plan:     Amita Finley, 53 year old female who presents with:  Viral URI with cough/ Acute Sinusitis  Care tips provided in AVS regarding treatment of cough, codeine given for use in the evening time only.   Will proceed with doxycycline to treat acute sinusitis, patient anxious about possible developing pneumonia given her immunosuppressants reassured her with 98% O2 and absent of crackles and absent of fevers this is unlikely, also her dose of doxycycline should theoretically treat for possible infection. Continue fluticasone and nasal saline.   - doxycycline hyclate (VIBRA-TABS) 100 MG tablet  Dispense: 14 tablet; Refill: 0  - benzonatate (TESSALON) 100 MG capsule  Dispense: 21 capsule; Refill: 1  - guaiFENesin-codeine (ROBITUSSIN AC) 100-10  MG/5ML solution  Dispense: 50 mL; Refill: 0      Amilcar Philippe MD   [facility]    The use of Dragon/[employer] dictation services may have been used to construct the content in this note; any grammatical or spelling errors are non-intentional. Please contact the author of this note directly if you are in need of any clarification.

## 2019-07-19 NOTE — PATIENT INSTRUCTIONS
Doxycycline twice a day for 7 days    Continue the albuterol as needed for wheezing/coughing fits 2 puffs every 4 hours as needed    Nasal saline and fluticasone once daily to help with sinus symptoms    Day time: tessalon, robitussin, and honey    Night time for cough: codeine, honey and tessalon      Call if you have new or worsening symptoms, return if you are short of breath despite the above medications

## 2019-07-26 ENCOUNTER — E-VISIT (OUTPATIENT)
Dept: PEDIATRICS | Facility: CLINIC | Age: 54
End: 2019-07-26
Payer: COMMERCIAL

## 2019-07-26 DIAGNOSIS — F41.9 ANXIETY: ICD-10-CM

## 2019-07-26 DIAGNOSIS — E06.3 HYPOTHYROIDISM DUE TO HASHIMOTO'S THYROIDITIS: Primary | ICD-10-CM

## 2019-07-26 DIAGNOSIS — F32.1 MAJOR DEPRESSIVE DISORDER, SINGLE EPISODE, MODERATE (H): ICD-10-CM

## 2019-07-26 PROCEDURE — 99444 ZZC PHYSICIAN ONLINE EVALUATION & MANAGEMENT SERVICE: CPT | Performed by: INTERNAL MEDICINE

## 2019-07-26 RX ORDER — ESCITALOPRAM OXALATE 10 MG/1
10 TABLET ORAL DAILY
Qty: 90 TABLET | Refills: 1 | Status: CANCELLED | OUTPATIENT
Start: 2019-07-26

## 2019-07-26 ASSESSMENT — ANXIETY QUESTIONNAIRES
3. WORRYING TOO MUCH ABOUT DIFFERENT THINGS: SEVERAL DAYS
4. TROUBLE RELAXING: SEVERAL DAYS
GAD7 TOTAL SCORE: 7
6. BECOMING EASILY ANNOYED OR IRRITABLE: SEVERAL DAYS
7. FEELING AFRAID AS IF SOMETHING AWFUL MIGHT HAPPEN: SEVERAL DAYS
5. BEING SO RESTLESS THAT IT IS HARD TO SIT STILL: NOT AT ALL
7. FEELING AFRAID AS IF SOMETHING AWFUL MIGHT HAPPEN: SEVERAL DAYS
1. FEELING NERVOUS, ANXIOUS, OR ON EDGE: MORE THAN HALF THE DAYS
2. NOT BEING ABLE TO STOP OR CONTROL WORRYING: SEVERAL DAYS
GAD7 TOTAL SCORE: 7
GAD7 TOTAL SCORE: 7

## 2019-07-26 ASSESSMENT — PATIENT HEALTH QUESTIONNAIRE - PHQ9
10. IF YOU CHECKED OFF ANY PROBLEMS, HOW DIFFICULT HAVE THESE PROBLEMS MADE IT FOR YOU TO DO YOUR WORK, TAKE CARE OF THINGS AT HOME, OR GET ALONG WITH OTHER PEOPLE: NOT DIFFICULT AT ALL
SUM OF ALL RESPONSES TO PHQ QUESTIONS 1-9: 6
SUM OF ALL RESPONSES TO PHQ QUESTIONS 1-9: 6
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
## 2019-07-27 ASSESSMENT — ANXIETY QUESTIONNAIRES: GAD7 TOTAL SCORE: 7

## 2019-07-27 ASSESSMENT — PATIENT HEALTH QUESTIONNAIRE - PHQ9: SUM OF ALL RESPONSES TO PHQ QUESTIONS 1-9: 6

## 2019-07-28 RX ORDER — LEVOTHYROXINE SODIUM 150 UG/1
150 TABLET ORAL DAILY
Qty: 115 TABLET | Refills: 1 | Status: SHIPPED | OUTPATIENT
Start: 2019-07-28 | End: 2019-10-27

## 2019-07-28 RX ORDER — ESCITALOPRAM OXALATE 10 MG/1
15 TABLET ORAL DAILY
Qty: 135 TABLET | Refills: 1 | Status: SHIPPED | OUTPATIENT
Start: 2019-07-28 | End: 2019-10-27

## 2019-07-28 NOTE — PATIENT INSTRUCTIONS
1. Increase escitalopram to 15 mg once a day (1.5 pills once a day)  2. Decrease levothyroxine to 150 mcg pills - take 2 pills (300 mcg) 2 days a week and 1 pill (150 mcg) all other days  3. Recheck thyroid labs in 3 months as lab visit  4. Follow-up in 6 months for physical - sooner if anxiety/depression not improving or worsening.

## 2019-10-04 ENCOUNTER — OFFICE VISIT (OUTPATIENT)
Dept: RHEUMATOLOGY | Facility: CLINIC | Age: 54
End: 2019-10-04
Attending: INTERNAL MEDICINE
Payer: COMMERCIAL

## 2019-10-04 VITALS
OXYGEN SATURATION: 97 % | DIASTOLIC BLOOD PRESSURE: 81 MMHG | WEIGHT: 256.5 LBS | TEMPERATURE: 97.6 F | BODY MASS INDEX: 40.26 KG/M2 | SYSTOLIC BLOOD PRESSURE: 125 MMHG | HEART RATE: 72 BPM | HEIGHT: 67 IN

## 2019-10-04 DIAGNOSIS — M19.90 INFLAMMATORY ARTHRITIS: ICD-10-CM

## 2019-10-04 DIAGNOSIS — M06.9 RHEUMATOID ARTHRITIS, INVOLVING UNSPECIFIED SITE, UNSPECIFIED RHEUMATOID FACTOR PRESENCE: ICD-10-CM

## 2019-10-04 PROCEDURE — G0463 HOSPITAL OUTPT CLINIC VISIT: HCPCS | Mod: ZF

## 2019-10-04 RX ORDER — PREDNISONE 5 MG/1
TABLET ORAL
Qty: 50 TABLET | Refills: 2 | Status: SHIPPED | OUTPATIENT
Start: 2019-10-04 | End: 2020-02-17

## 2019-10-04 ASSESSMENT — PAIN SCALES - GENERAL: PAINLEVEL: MODERATE PAIN (5)

## 2019-10-04 ASSESSMENT — MIFFLIN-ST. JEOR: SCORE: 1793.17

## 2019-10-04 NOTE — PATIENT INSTRUCTIONS
Diagnosis: rheumatoid arthritis     Plan:   - Labs today with liver function, blood counts, and creatinine   - Obtain bilateral hand x-rays   - Continue plaquenil 400 mg daily; Ophghalmology visit annually  - Start methotrexate 15 (6 tabs), then 20 mg every week.  - Start prednisone (20 mg daily for 1 week, then 15 mg daily for 1 week)      While on methotrexate:   -- Check labs every 3-4 months (AST/ALT, Albumin, CBC with platelets)   -- Limit alcohol intake to 2 drinks weekly; use folate 1 mg daily.  --Tylenol 500 mg can be used as needed up to three times daily for nausea/headache associated with dosing.

## 2019-10-04 NOTE — LETTER
10/4/2019      RE: Amita Finley  [address] [facility] [id number]       [facility]  Rheumatology Clinic  Lucio Duran MD  10/04/2019     Name: Amita Finley  MRN: [id number]  Age: 53 year old  : 1965  Referring provider: Mark Torres     Assessment and Plan:   # Seronegative inflammatory arthritis:   Patient relates reduced hand cramping, but persistent symmetrical small joint hand and foot pain. Exam shows tenderness without gross synovial thickening at multiple MCPs, thumb MPs, left ankle, and multiple MTPs. Blood work on 2019 showed creatinine, CRP, CCP, RF, uric acid, CBC, lupus panel, NAEL, anti-cardiolipin antibodies, anti-B2GP1, MARIO panel all negative or normal. TSH was suppressed at 0.04 and free T4 was high at 1.64. Liver function was normal in 2019.     I agree with Dr. Torres about the diagnosis of rheumatoid arthritis. The absence of RF and CCP, and the normal CRP measured in 2019 reflect good-prognosis disease. I think that she has experienced modest improvement with hydroxychloroquine monotherapy, but symptoms and signs of inflammatory joint disease persist. I think tthat further trial of plaquenil is unlikely to result in satisfactory reduction in chronic arthralgia. I therefore recommend adding methotrexate. We had a good discussion about the risks and benefits of methotrexate. I suggest starting 15 mg for 1 week, then moving to 20 mg once weekly. While on the drug, she should undergo every 3 month CBC, LFTs, and creatinine. She should restrict alcohol intake to 2 drinks per week and she may use a B-complex multivitamin daily to prevent mucosal irritation. I recommend continuing plaquenil 400 mg daily and obtaining screening toxicity exam annually. As a bridge to methotrexate efficacy, I recommend prednisone 20 mg daily for 1 week, then 15 mg daily for 1 week, then off. Intermittent prednisone may be used 3-4 times annually for flares of  joint pain. She will undergo initial exam in 06/2020. I will repeat LFTs, CBC, and CRP today, and I will ask for plain films of the hands to rule out bony erosions.     Follow-up: Return in about 3 months (around 1/4/2020).     HPI:   Amita Finley is a 53 year old female with a history of inflammatory arthritis and antiphospholipid antibody syndrome with recurrent miscarriages who presents for evaluation.     She previously followed with Dr. Torres, who she last saw on 06/07/2019. He noted history of rheumatoid arthritis diagnosed 10+ years ago, which had worsened in the 3 months prior. She had joint pain in the index fingers, middle fingers, small fingers, elbows, great toe on the left, forefoot on the right, and neck. Prednisone and plaquenil were started.      Today she reports that she has been using plaquenil for about 3 months but continues to have hand and foot pain. She is right handed but the left hand always had more pain and swelling compared to the right. The 2nd and 3rd knuckles are the most painful, although for a period of time the 4th and 5th knuckles were most painful. Hand cramping/claw hand formation, which was present for years, has decreased since starting prednisone. She notes that she was hopping on her left ankle while moving some furniture about a month and a half and since the ankle has been hot and painful, worse with weight bearing. Her joint pain is worse in the morning and accompanied bu about 2-2.5 hours of early morning stiffness.     She uses ibuprofen occasionally but tries to avoid this due to problems with leg edema. She used prednisone when she first started plaquenil and had about 2 weeks of significant improvement in joint pain. She has used medrol Dosepak on and off but had never been on other immunomodulatory medications. She has used Lovenox during pregnancy. She uses hydrochlorothiazide and spironolactone for leg swelling. She also takes thyroid replacement,  which was adjusted after labs in 06/2019. She uses trazodone at night for sleep. She has had a right knee injection in the past for knee pain, which was successful.     She denies sores in the mouth or nose. She has had small itchy nodules on the fingers but denies rashes otherwise. She also denies cough, shortness of breath, abdominal pain, nausea, vomiting, diarrhea, constipation, or numbness, weakness, tingling in the arms or legs.     Review of Systems:   Pertinent items are noted in HPI or as below, remainder of complete ROS is negative.      No recent problems with hearing or vision. No swallowing problems.   No breathing difficulty, shortness of breath, coughing, or wheezing  No chest pain or palpitations  No heart burn, indigestion, abdominal pain, nausea, vomiting, diarrhea  No urination problems, no bloody, cloudy urine, no dysuria  No numbing, tingling, weakness  No headaches or confusion  No rashes. No easy bleeding or bruising.     Active Medications:     Current Outpatient Medications:      diclofenac (VOLTAREN) 1 % topical gel, Apply 4 grams to ankle or 2 grams to hands four times daily using enclosed dosing card., Disp: 100 g, Rfl: 1     escitalopram (LEXAPRO) 10 MG tablet, Take 1.5 tablets (15 mg) by mouth daily, Disp: 135 tablet, Rfl: 1     hydrochlorothiazide (HYDRODIURIL) 25 MG tablet, Take 1 tablet (25 mg) by mouth daily, Disp: 90 tablet, Rfl: 3     hydroxychloroquine (PLAQUENIL) 200 MG tablet, Take 1 tablet (200 mg) by mouth 2 times daily Annual Plaquenil toxicity eye screening required., Disp: 180 tablet, Rfl: 1     levothyroxine (SYNTHROID/LEVOTHROID) 150 MCG tablet, Take 1 tablet (150 mcg) by mouth daily Take 300 mg by mouth on Monday and Friday. Take 150 mg by mouth all other days., Disp: 115 tablet, Rfl: 1     LORazepam (ATIVAN) 0.5 MG tablet, TAKE ONE-HALF TO ONE TABLET BY MOUTH EVERY 8 HOURS AS NEEDED FOR ANXIETY, Disp: 20 tablet, Rfl: 0     methotrexate 2.5 MG tablet, Take 8 tablets (20  mg) by mouth every 7 days Labs every 8 - 12 weeks for refills., Disp: 24 tablet, Rfl: 2     predniSONE (DELTASONE) 5 MG tablet, 4 tabs daily for 1 week, then 3 tabs daily for 1 week, Disp: 50 tablet, Rfl: 2     spironolactone (ALDACTONE) 50 MG tablet, Take 2 tablets (100 mg) by mouth daily, Disp: 180 tablet, Rfl: 3     traZODone (DESYREL) 50 MG tablet, Take 2 tablets (100 mg) by mouth nightly as needed for sleep, Disp: 180 tablet, Rfl: 3     benzonatate (TESSALON) 100 MG capsule, Take 1 capsule (100 mg) by mouth 3 times daily as needed for cough, Disp: 21 capsule, Rfl: 1     guaiFENesin-codeine (ROBITUSSIN AC) 100-10 MG/5ML solution, Take 5-10 mLs by mouth nightly as needed for cough, Disp: 50 mL, Rfl: 0     hydrOXYzine (ATARAX) 25 MG tablet, TAKE 1-2 TABLET(S) BY MOUTH EVERY 6 HOURS AS NEEDED FOR ANXIETY, Disp: 60 tablet, Rfl: 1     ibuprofen (ADVIL/MOTRIN) 800 MG tablet, Take 1 tablet (800 mg) by mouth every 6 hours as needed for moderate pain, Disp: 20 tablet, Rfl: 0     methylPREDNISolone (MEDROL DOSEPAK) 4 MG tablet therapy pack, Follow Package Directions, Disp: 21 tablet, Rfl: 0     predniSONE (DELTASONE) 5 MG tablet, Take 15 mg PO daily X 15 days, Disp: 45 tablet, Rfl: 0      Allergies:   Ciprofloxacin and Augmentin      Past Medical History:  Antiphospholipid antibodies positive   Inflammatory arthritis    Baker's cyst of knee   Hypothyroidism   Obesity   Hypothyroidism   Lump/mass in breast   Rosacea   Back pain   Leg edema   Anxiety   Depression   Dry eye     Past Surgical History:  Novasure endometrial ablation 2007     Family History:   Mother: GI disease, hypertension  Father: coronary artery disease, diabetes mellitus, MI, glaucoma, hypertension   Maternal grandmother: cancer   Sister: GI disease   Daughter: thyroid disease     Social History:   Former smoker, 30 pack years, quit in 1996.   3 drinks per week.   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 She works 60  "hours per week. 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   She has two children.     Physical Exam:   /81   Pulse 72   Temp 97.6  F (36.4  C) (Oral)   Ht 1.689 m (5' 6.5\")   Wt 116.3 kg (256 lb 8 oz)   SpO2 97%   BMI 40.78 kg/m      Wt Readings from Last 4 Encounters:   10/04/19 116.3 kg (256 lb 8 oz)   07/18/19 111.1 kg (245 lb)   06/07/19 112.6 kg (248 lb 4.8 oz)   01/04/19 111.6 kg (246 lb)     Constitutional: Well-developed, appearing stated age; cooperative  Eyes: Normal EOM, PERRLA, vision, conjunctiva, sclera  ENT: Normal external ears, nose, hearing, lips, teeth, gums, throat. No mucous membrane lesions, normal saliva pool  Neck: No mass or thyroid enlargement  Resp: Lungs clear to auscultation, nl to palpation  CV: RRR, no murmurs, rubs or gallops, no edema  GI: No ABD mass or tenderness, no HSM  : Not tested  Lymph: No cervical, supraclavicular, inguinal or epitrochlear nodes  MS: Some subtle fusiform swelling in multiple digits. Tenderness at MCPs 2, 3, and 5 on the left. Tenderness at the left 1st CMC and thumb MP. Left ankle more swollen than the right. Tenderness around the left tibiotalar joint. Tender at the right as well. Tenderness at the dorsal midfoot on the left more so than the right. Tenderness in 1st MTPs on both sides. 0.5 cm firm mass on the volar aspect of the left 2nd PIP. The TMJ, neck, shoulder, elbow, spine, hip, knee joints were examined and found normal. No active synovitis or altered joint anatomy. Full joint ROM. Normal  strength. No dactylitis,  tenosynovitis, enthesopathy.  Skin: No nail pitting, alopecia, rash, nodules or lesions  Neuro: Normal cranial nerves, strength, sensation, DTRs.   Psych: Normal judgement, orientation, memory, affect.     Laboratory:   RHEUM RESULTS Latest Ref Rng & Units 11/26/2018 1/4/2019 6/7/2019   DNA-DS <10 IU/mL - - 1   SED RATE 0 - 30 mm/h - - 9   CRP, INFLAMMATION 0.0 - 8.0 mg/L - - 6.0 "   RHEUMATOID FACTOR <20 IU/mL - - <20   NAEL SCREEN BY EIA <1.0 - - -   AST 0 - 45 U/L - 22 -   ALT 0 - 50 U/L - 31 -   ALBUMIN 3.4 - 5.0 g/dL - 3.9 -   WBC 4.0 - 11.0 10e9/L - - 7.6   RBC 3.8 - 5.2 10e12/L - - 5.09   HGB 11.7 - 15.7 g/dL - - 14.3   HCT 35.0 - 47.0 % - - 42.7   MCV 78 - 100 fl - - 84   MCHC 31.5 - 36.5 g/dL - - 33.5   RDW 10.0 - 15.0 % - - 13.4    - 450 10e9/L - - 248   CREATININE 0.52 - 1.04 mg/dL 1.02 - 0.87   GFR ESTIMATE, IF BLACK >60 mL/min/[1.73:m2] 69 - 88   GFR ESTIMATE >60 mL/min/[1.73:m2] 57(L) - 76   HEP B SURFACE YOAV - - - -   HEPATITIS C ANTIBODY NR - - -       Rheumatoid Factor   Date Value Ref Range Status   06/07/2019 <20 <20 IU/mL Final     Cyclic Citrullinated Peptide Antibody, IgG   Date Value Ref Range Status   06/07/2019 1 <7 U/mL Final     Comment:     Negative     Cyclic Cit Pept IgG/IgA   Date Value Ref Range Status   10/30/2014 <20  Interpretation:  Negative   <20 UNITS Final     Scleroderma Antibody Scl-70 MARIO IgG   Date Value Ref Range Status   06/07/2019 0.5 0.0 - 0.9 AI Final     Comment:     Negative  Antibody index (AI) values reflect qualitative changes in antibody   concentration that cannot be directly associated with clinical condition or   disease state.       SSA (Ro) (MARIO) Antibody, IgG   Date Value Ref Range Status   06/07/2019 <0.2 0.0 - 0.9 AI Final     Comment:     Negative  Antibody index (AI) values reflect qualitative changes in antibody   concentration that cannot be directly associated with clinical condition or   disease state.       SSB (La) (MARIO) Antibody, IgG   Date Value Ref Range Status   06/07/2019 <0.2 0.0 - 0.9 AI Final     Comment:     Negative  Antibody index (AI) values reflect qualitative changes in antibody   concentration that cannot be directly associated with clinical condition or   disease state.       NAEL interpretation   Date Value Ref Range Status   06/07/2019 Negative NEG^Negative Final     Comment:                                         Reference range:  <1:40  NEGATIVE  1:40 - 1:80  BORDERLINE POSITIVE  >1:80 POSITIVE       NAEL Screen by EIA   Date Value Ref Range Status   10/07/2011 <1.0  Interpretation:  Negative <1.0 Final     DNA-ds   Date Value Ref Range Status   06/07/2019 1 <10 IU/mL Final     Comment:     Negative     Beta 2 Glycoprotein 1 Antibody IgG   Date Value Ref Range Status   06/07/2019 0.7 <7 U/mL Final     Comment:     Negative     Cardiolipin Antibody IgG   Date Value Ref Range Status   06/07/2019 <1.6 0.0 - 19.9 GPL-U/mL Final     Comment:     Negative     Cardiolipin Antibody IgM   Date Value Ref Range Status   06/07/2019 0.5 0.0 - 19.9 MPL-U/mL Final     Comment:     Negative     Hep B Surface Agn   Date Value Ref Range Status   12/03/2010 Negative NEG Final     [place] MARIO Antibody IgG   Date Value Ref Range Status   06/07/2019 <0.2 0.0 - 0.9 AI Final     Comment:     Negative  Antibody index (AI) values reflect qualitative changes in antibody   concentration that cannot be directly associated with clinical condition or   disease state.       Scleroderma Antibody Scl-70 MARIO IgG   Date Value Ref Range Status   06/07/2019 0.5 0.0 - 0.9 AI Final     Comment:     Negative  Antibody index (AI) values reflect qualitative changes in antibody   concentration that cannot be directly associated with clinical condition or   disease state.         Scribe Disclosure:  ILouise, am serving as a scribe to document services personally performed by Lucio Duran MD at this visit, based upon the provider's statements to me. All documentation has been reviewed by the aforementioned provider prior to being entered into the official medical record.     Lucio Duran MD

## 2019-10-04 NOTE — NURSING NOTE
"Chief Complaint   Patient presents with     Consult     Inflammatory arthritis     /81   Pulse 72   Temp 97.6  F (36.4  C) (Oral)   Ht 1.689 m (5' 6.5\")   Wt 116.3 kg (256 lb 8 oz)   SpO2 97%   BMI 40.78 kg/m    Catrachita Shaw CMA    "

## 2019-10-04 NOTE — PROGRESS NOTES
[facility]  Rheumatology Clinic  Lucio Duran MD  10/04/2019     Name: Amita Finley  MRN: [id number]  Age: 53 year old  : 1965  Referring provider: Mark Torres     Assessment and Plan:   # Seronegative inflammatory arthritis:   Patient relates reduced hand cramping, but persistent symmetrical small joint hand and foot pain. Exam shows tenderness without gross synovial thickening at multiple MCPs, thumb MPs, left ankle, and multiple MTPs. Blood work on 2019 showed creatinine, CRP, CCP, RF, uric acid, CBC, lupus panel, NAEL, anti-cardiolipin antibodies, anti-B2GP1, MARIO panel all negative or normal. TSH was suppressed at 0.04 and free T4 was high at 1.64. Liver function was normal in 2019.     I agree with Dr. Torres about the diagnosis of rheumatoid arthritis. The absence of RF and CCP, and the normal CRP measured in 2019 reflect good-prognosis disease. I think that she has experienced modest improvement with hydroxychloroquine monotherapy, but symptoms and signs of inflammatory joint disease persist. I think tthat further trial of plaquenil is unlikely to result in satisfactory reduction in chronic arthralgia. I therefore recommend adding methotrexate. We had a good discussion about the risks and benefits of methotrexate. I suggest starting 15 mg for 1 week, then moving to 20 mg once weekly. While on the drug, she should undergo every 3 month CBC, LFTs, and creatinine. She should restrict alcohol intake to 2 drinks per week and she may use a B-complex multivitamin daily to prevent mucosal irritation. I recommend continuing plaquenil 400 mg daily and obtaining screening toxicity exam annually. As a bridge to methotrexate efficacy, I recommend prednisone 20 mg daily for 1 week, then 15 mg daily for 1 week, then off. Intermittent prednisone may be used 3-4 times annually for flares of joint pain. She will undergo initial exam in 2020. I will repeat LFTs, CBC, and CRP  today, and I will ask for plain films of the hands to rule out bony erosions.     Follow-up: Return in about 3 months (around 1/4/2020).     HPI:   Amita Finley is a 53 year old female with a history of inflammatory arthritis and antiphospholipid antibody syndrome with recurrent miscarriages who presents for evaluation.     She previously followed with Dr. Torres, who she last saw on 06/07/2019. He noted history of rheumatoid arthritis diagnosed 10+ years ago, which had worsened in the 3 months prior. She had joint pain in the index fingers, middle fingers, small fingers, elbows, great toe on the left, forefoot on the right, and neck. Prednisone and plaquenil were started.      Today she reports that she has been using plaquenil for about 3 months but continues to have hand and foot pain. She is right handed but the left hand always had more pain and swelling compared to the right. The 2nd and 3rd knuckles are the most painful, although for a period of time the 4th and 5th knuckles were most painful. Hand cramping/claw hand formation, which was present for years, has decreased since starting prednisone. She notes that she was hopping on her left ankle while moving some furniture about a month and a half and since the ankle has been hot and painful, worse with weight bearing. Her joint pain is worse in the morning and accompanied bu about 2-2.5 hours of early morning stiffness.     She uses ibuprofen occasionally but tries to avoid this due to problems with leg edema. She used prednisone when she first started plaquenil and had about 2 weeks of significant improvement in joint pain. She has used medrol Dosepak on and off but had never been on other immunomodulatory medications. She has used Lovenox during pregnancy. She uses hydrochlorothiazide and spironolactone for leg swelling. She also takes thyroid replacement, which was adjusted after labs in 06/2019. She uses trazodone at night for sleep. She has  had a right knee injection in the past for knee pain, which was successful.     She denies sores in the mouth or nose. She has had small itchy nodules on the fingers but denies rashes otherwise. She also denies cough, shortness of breath, abdominal pain, nausea, vomiting, diarrhea, constipation, or numbness, weakness, tingling in the arms or legs.     Review of Systems:   Pertinent items are noted in HPI or as below, remainder of complete ROS is negative.      No recent problems with hearing or vision. No swallowing problems.   No breathing difficulty, shortness of breath, coughing, or wheezing  No chest pain or palpitations  No heart burn, indigestion, abdominal pain, nausea, vomiting, diarrhea  No urination problems, no bloody, cloudy urine, no dysuria  No numbing, tingling, weakness  No headaches or confusion  No rashes. No easy bleeding or bruising.     Active Medications:     Current Outpatient Medications:      diclofenac (VOLTAREN) 1 % topical gel, Apply 4 grams to ankle or 2 grams to hands four times daily using enclosed dosing card., Disp: 100 g, Rfl: 1     escitalopram (LEXAPRO) 10 MG tablet, Take 1.5 tablets (15 mg) by mouth daily, Disp: 135 tablet, Rfl: 1     hydrochlorothiazide (HYDRODIURIL) 25 MG tablet, Take 1 tablet (25 mg) by mouth daily, Disp: 90 tablet, Rfl: 3     hydroxychloroquine (PLAQUENIL) 200 MG tablet, Take 1 tablet (200 mg) by mouth 2 times daily Annual Plaquenil toxicity eye screening required., Disp: 180 tablet, Rfl: 1     levothyroxine (SYNTHROID/LEVOTHROID) 150 MCG tablet, Take 1 tablet (150 mcg) by mouth daily Take 300 mg by mouth on Monday and Friday. Take 150 mg by mouth all other days., Disp: 115 tablet, Rfl: 1     LORazepam (ATIVAN) 0.5 MG tablet, TAKE ONE-HALF TO ONE TABLET BY MOUTH EVERY 8 HOURS AS NEEDED FOR ANXIETY, Disp: 20 tablet, Rfl: 0     methotrexate 2.5 MG tablet, Take 8 tablets (20 mg) by mouth every 7 days Labs every 8 - 12 weeks for refills., Disp: 24 tablet, Rfl:  2     predniSONE (DELTASONE) 5 MG tablet, 4 tabs daily for 1 week, then 3 tabs daily for 1 week, Disp: 50 tablet, Rfl: 2     spironolactone (ALDACTONE) 50 MG tablet, Take 2 tablets (100 mg) by mouth daily, Disp: 180 tablet, Rfl: 3     traZODone (DESYREL) 50 MG tablet, Take 2 tablets (100 mg) by mouth nightly as needed for sleep, Disp: 180 tablet, Rfl: 3     benzonatate (TESSALON) 100 MG capsule, Take 1 capsule (100 mg) by mouth 3 times daily as needed for cough, Disp: 21 capsule, Rfl: 1     guaiFENesin-codeine (ROBITUSSIN AC) 100-10 MG/5ML solution, Take 5-10 mLs by mouth nightly as needed for cough, Disp: 50 mL, Rfl: 0     hydrOXYzine (ATARAX) 25 MG tablet, TAKE 1-2 TABLET(S) BY MOUTH EVERY 6 HOURS AS NEEDED FOR ANXIETY, Disp: 60 tablet, Rfl: 1     ibuprofen (ADVIL/MOTRIN) 800 MG tablet, Take 1 tablet (800 mg) by mouth every 6 hours as needed for moderate pain, Disp: 20 tablet, Rfl: 0     methylPREDNISolone (MEDROL DOSEPAK) 4 MG tablet therapy pack, Follow Package Directions, Disp: 21 tablet, Rfl: 0     predniSONE (DELTASONE) 5 MG tablet, Take 15 mg PO daily X 15 days, Disp: 45 tablet, Rfl: 0      Allergies:   Ciprofloxacin and Augmentin      Past Medical History:  Antiphospholipid antibodies positive   Inflammatory arthritis    Baker's cyst of knee   Hypothyroidism   Obesity   Hypothyroidism   Lump/mass in breast   Rosacea   Back pain   Leg edema   Anxiety   Depression   Dry eye     Past Surgical History:  Novasure endometrial ablation 2007     Family History:   Mother: GI disease, hypertension  Father: coronary artery disease, diabetes mellitus, MI, glaucoma, hypertension   Maternal grandmother: cancer   Sister: GI disease   Daughter: thyroid disease     Social History:   Former smoker, 30 pack years, quit in 1996.   3 drinks per week.   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 She works 60 hours per week. She used to work in neurosurgery but struggled in the OR due to joint  "symptoms.   She has two children.     Physical Exam:   /81   Pulse 72   Temp 97.6  F (36.4  C) (Oral)   Ht 1.689 m (5' 6.5\")   Wt 116.3 kg (256 lb 8 oz)   SpO2 97%   BMI 40.78 kg/m     Wt Readings from Last 4 Encounters:   10/04/19 116.3 kg (256 lb 8 oz)   07/18/19 111.1 kg (245 lb)   06/07/19 112.6 kg (248 lb 4.8 oz)   01/04/19 111.6 kg (246 lb)     Constitutional: Well-developed, appearing stated age; cooperative  Eyes: Normal EOM, PERRLA, vision, conjunctiva, sclera  ENT: Normal external ears, nose, hearing, lips, teeth, gums, throat. No mucous membrane lesions, normal saliva pool  Neck: No mass or thyroid enlargement  Resp: Lungs clear to auscultation, nl to palpation  CV: RRR, no murmurs, rubs or gallops, no edema  GI: No ABD mass or tenderness, no HSM  : Not tested  Lymph: No cervical, supraclavicular, inguinal or epitrochlear nodes  MS: Some subtle fusiform swelling in multiple digits. Tenderness at MCPs 2, 3, and 5 on the left. Tenderness at the left 1st CMC and thumb MP. Left ankle more swollen than the right. Tenderness around the left tibiotalar joint. Tender at the right as well. Tenderness at the dorsal midfoot on the left more so than the right. Tenderness in 1st MTPs on both sides. 0.5 cm firm mass on the volar aspect of the left 2nd PIP. The TMJ, neck, shoulder, elbow, spine, hip, knee joints were examined and found normal. No active synovitis or altered joint anatomy. Full joint ROM. Normal  strength. No dactylitis,  tenosynovitis, enthesopathy.  Skin: No nail pitting, alopecia, rash, nodules or lesions  Neuro: Normal cranial nerves, strength, sensation, DTRs.   Psych: Normal judgement, orientation, memory, affect.     Laboratory:   RHEUM RESULTS Latest Ref Rng & Units 11/26/2018 1/4/2019 6/7/2019   DNA-DS <10 IU/mL - - 1   SED RATE 0 - 30 mm/h - - 9   CRP, INFLAMMATION 0.0 - 8.0 mg/L - - 6.0   RHEUMATOID FACTOR <20 IU/mL - - <20   NAEL SCREEN BY EIA <1.0 - - -   AST 0 - 45 U/L - 22 " -   ALT 0 - 50 U/L - 31 -   ALBUMIN 3.4 - 5.0 g/dL - 3.9 -   WBC 4.0 - 11.0 10e9/L - - 7.6   RBC 3.8 - 5.2 10e12/L - - 5.09   HGB 11.7 - 15.7 g/dL - - 14.3   HCT 35.0 - 47.0 % - - 42.7   MCV 78 - 100 fl - - 84   MCHC 31.5 - 36.5 g/dL - - 33.5   RDW 10.0 - 15.0 % - - 13.4    - 450 10e9/L - - 248   CREATININE 0.52 - 1.04 mg/dL 1.02 - 0.87   GFR ESTIMATE, IF BLACK >60 mL/min/[1.73:m2] 69 - 88   GFR ESTIMATE >60 mL/min/[1.73:m2] 57(L) - 76   HEP B SURFACE YOAV - - - -   HEPATITIS C ANTIBODY NR - - -       Rheumatoid Factor   Date Value Ref Range Status   06/07/2019 <20 <20 IU/mL Final     Cyclic Citrullinated Peptide Antibody, IgG   Date Value Ref Range Status   06/07/2019 1 <7 U/mL Final     Comment:     Negative     Cyclic Cit Pept IgG/IgA   Date Value Ref Range Status   10/30/2014 <20  Interpretation:  Negative   <20 UNITS Final     Scleroderma Antibody Scl-70 MARIO IgG   Date Value Ref Range Status   06/07/2019 0.5 0.0 - 0.9 AI Final     Comment:     Negative  Antibody index (AI) values reflect qualitative changes in antibody   concentration that cannot be directly associated with clinical condition or   disease state.       SSA (Ro) (MARIO) Antibody, IgG   Date Value Ref Range Status   06/07/2019 <0.2 0.0 - 0.9 AI Final     Comment:     Negative  Antibody index (AI) values reflect qualitative changes in antibody   concentration that cannot be directly associated with clinical condition or   disease state.       SSB (La) (MARIO) Antibody, IgG   Date Value Ref Range Status   06/07/2019 <0.2 0.0 - 0.9 AI Final     Comment:     Negative  Antibody index (AI) values reflect qualitative changes in antibody   concentration that cannot be directly associated with clinical condition or   disease state.       NAEL interpretation   Date Value Ref Range Status   06/07/2019 Negative NEG^Negative Final     Comment:                                        Reference range:  <1:40  NEGATIVE  1:40 - 1:80  BORDERLINE POSITIVE  >1:80  POSITIVE       NAEL Screen by EIA   Date Value Ref Range Status   10/07/2011 <1.0  Interpretation:  Negative <1.0 Final     DNA-ds   Date Value Ref Range Status   06/07/2019 1 <10 IU/mL Final     Comment:     Negative     Beta 2 Glycoprotein 1 Antibody IgG   Date Value Ref Range Status   06/07/2019 0.7 <7 U/mL Final     Comment:     Negative     Cardiolipin Antibody IgG   Date Value Ref Range Status   06/07/2019 <1.6 0.0 - 19.9 GPL-U/mL Final     Comment:     Negative     Cardiolipin Antibody IgM   Date Value Ref Range Status   06/07/2019 0.5 0.0 - 19.9 MPL-U/mL Final     Comment:     Negative     Hep B Surface Agn   Date Value Ref Range Status   12/03/2010 Negative NEG Final     P[place]chard MARIO Antibody IgG   Date Value Ref Range Status   06/07/2019 <0.2 0.0 - 0.9 AI Final     Comment:     Negative  Antibody index (AI) values reflect qualitative changes in antibody   concentration that cannot be directly associated with clinical condition or   disease state.       Scleroderma Antibody Scl-70 MARIO IgG   Date Value Ref Range Status   06/07/2019 0.5 0.0 - 0.9 AI Final     Comment:     Negative  Antibody index (AI) values reflect qualitative changes in antibody   concentration that cannot be directly associated with clinical condition or   disease state.         Scribe Disclosure:  I, Louise Marrero, am serving as a scribe to document services personally performed by Lucio Duran MD at this visit, based upon the provider's statements to me. All documentation has been reviewed by the aforementioned provider prior to being entered into the official medical record.

## 2019-10-15 ENCOUNTER — ANCILLARY PROCEDURE (OUTPATIENT)
Dept: GENERAL RADIOLOGY | Facility: CLINIC | Age: 54
End: 2019-10-15
Attending: INTERNAL MEDICINE
Payer: COMMERCIAL

## 2019-10-15 DIAGNOSIS — M06.9 RHEUMATOID ARTHRITIS, INVOLVING UNSPECIFIED SITE, UNSPECIFIED RHEUMATOID FACTOR PRESENCE: ICD-10-CM

## 2019-10-15 LAB
ERYTHROCYTE [DISTWIDTH] IN BLOOD BY AUTOMATED COUNT: 13.3 % (ref 10–15)
HCT VFR BLD AUTO: 45.1 % (ref 35–47)
HGB BLD-MCNC: 15 G/DL (ref 11.7–15.7)
MCH RBC QN AUTO: 28.5 PG (ref 26.5–33)
MCHC RBC AUTO-ENTMCNC: 33.3 G/DL (ref 31.5–36.5)
MCV RBC AUTO: 86 FL (ref 78–100)
PLATELET # BLD AUTO: 275 10E9/L (ref 150–450)
RBC # BLD AUTO: 5.27 10E12/L (ref 3.8–5.2)
WBC # BLD AUTO: 8.7 10E9/L (ref 4–11)

## 2019-10-15 PROCEDURE — 73120 X-RAY EXAM OF HAND: CPT | Mod: 52

## 2019-10-15 PROCEDURE — 84450 TRANSFERASE (AST) (SGOT): CPT | Performed by: INTERNAL MEDICINE

## 2019-10-15 PROCEDURE — 86140 C-REACTIVE PROTEIN: CPT | Performed by: INTERNAL MEDICINE

## 2019-10-15 PROCEDURE — 36415 COLL VENOUS BLD VENIPUNCTURE: CPT | Performed by: INTERNAL MEDICINE

## 2019-10-15 PROCEDURE — 82565 ASSAY OF CREATININE: CPT | Performed by: INTERNAL MEDICINE

## 2019-10-15 PROCEDURE — 85027 COMPLETE CBC AUTOMATED: CPT | Performed by: INTERNAL MEDICINE

## 2019-10-15 PROCEDURE — 84460 ALANINE AMINO (ALT) (SGPT): CPT | Performed by: INTERNAL MEDICINE

## 2019-10-16 LAB
ALT SERPL W P-5'-P-CCNC: 40 U/L (ref 0–50)
AST SERPL W P-5'-P-CCNC: 20 U/L (ref 0–45)
CREAT SERPL-MCNC: 0.98 MG/DL (ref 0.52–1.04)
CRP SERPL-MCNC: 4.8 MG/L (ref 0–8)
GFR SERPL CREATININE-BSD FRML MDRD: 65 ML/MIN/{1.73_M2}

## 2019-10-27 ENCOUNTER — E-VISIT (OUTPATIENT)
Dept: PEDIATRICS | Facility: CLINIC | Age: 54
End: 2019-10-27
Payer: COMMERCIAL

## 2019-10-27 DIAGNOSIS — F41.9 ANXIETY: ICD-10-CM

## 2019-10-27 DIAGNOSIS — R60.0 EDEMA OF BOTH LEGS: ICD-10-CM

## 2019-10-27 DIAGNOSIS — F32.1 MAJOR DEPRESSIVE DISORDER, SINGLE EPISODE, MODERATE (H): Primary | ICD-10-CM

## 2019-10-27 DIAGNOSIS — F51.01 PRIMARY INSOMNIA: ICD-10-CM

## 2019-10-27 DIAGNOSIS — M77.51 BURSITIS OF RIGHT ANKLE: ICD-10-CM

## 2019-10-27 DIAGNOSIS — Z13.220 SCREENING CHOLESTEROL LEVEL: ICD-10-CM

## 2019-10-27 DIAGNOSIS — E06.3 HYPOTHYROIDISM DUE TO HASHIMOTO'S THYROIDITIS: ICD-10-CM

## 2019-10-27 PROCEDURE — 99444 ZZC PHYSICIAN ONLINE EVALUATION & MANAGEMENT SERVICE: CPT | Performed by: INTERNAL MEDICINE
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
## 2019-11-15 NOTE — TELEPHONE ENCOUNTER
Please call patient. I cannot complete the e-visit and fill medications without her returning the PHQ9. She also needs to come in for a fasting lab visit. This will need to be completed by 11/25 so I have the results before I leave.     Thanks,  Amanda Barros MD  Internal Medicine/Pediatrics

## 2019-11-27 RX ORDER — HYDROXYZINE HYDROCHLORIDE 25 MG/1
TABLET, FILM COATED ORAL
Qty: 60 TABLET | Refills: 0 | Status: SHIPPED | OUTPATIENT
Start: 2019-11-27 | End: 2020-08-10

## 2019-11-27 RX ORDER — ESCITALOPRAM OXALATE 20 MG/1
20 TABLET ORAL DAILY
Qty: 90 TABLET | Refills: 0 | Status: SHIPPED | OUTPATIENT
Start: 2019-11-27 | End: 2019-12-06

## 2019-11-27 RX ORDER — TRAZODONE HYDROCHLORIDE 50 MG/1
100 TABLET, FILM COATED ORAL
Qty: 180 TABLET | Refills: 0 | Status: SHIPPED | OUTPATIENT
Start: 2019-11-27 | End: 2019-12-06

## 2019-11-27 RX ORDER — HYDROCHLOROTHIAZIDE 25 MG/1
25 TABLET ORAL DAILY
Qty: 90 TABLET | Refills: 0 | Status: SHIPPED | OUTPATIENT
Start: 2019-11-27 | End: 2019-12-06

## 2019-11-27 RX ORDER — LEVOTHYROXINE SODIUM 150 UG/1
150 TABLET ORAL DAILY
Qty: 115 TABLET | Refills: 0 | Status: SHIPPED | OUTPATIENT
Start: 2019-11-27 | End: 2019-12-06

## 2019-11-27 RX ORDER — SPIRONOLACTONE 50 MG/1
100 TABLET, FILM COATED ORAL DAILY
Qty: 180 TABLET | Refills: 0 | Status: SHIPPED | OUTPATIENT
Start: 2019-11-27 | End: 2020-06-26

## 2019-11-27 NOTE — TELEPHONE ENCOUNTER
Called patient and LM that this is Provider's last day with clinic and PHQ9 needs to be completed.  Niya Brandon LPN

## 2019-11-27 NOTE — PATIENT INSTRUCTIONS
1. Increase lexapro to 20 mg once a day  2. Refills sent for 90 days on all scripts  3. Schedule fasting lab appointment to check cholesterol, thyroid, electrolytes  4. Will need an appointment within next 3 months to get further refills.    It has been great seeing you over the last several years!    Take care!  Amanda Barros MD  Internal Medicine/Pediatrics

## 2019-12-02 ENCOUNTER — MYC REFILL (OUTPATIENT)
Dept: RHEUMATOLOGY | Facility: CLINIC | Age: 54
End: 2019-12-02

## 2019-12-02 DIAGNOSIS — M06.9 RHEUMATOID ARTHRITIS, INVOLVING UNSPECIFIED SITE, UNSPECIFIED RHEUMATOID FACTOR PRESENCE: ICD-10-CM

## 2019-12-03 NOTE — TELEPHONE ENCOUNTER
methotrexate 2.5 MG tablet       Last Written Prescription Date:  10-4-19  Last Fill Quantity: 24 (3 week supply),   # refills: 2  Last Office Visit : 10-4-19  Future Office visit:  1-      CBC RESULTS:   Recent Labs   Lab Test 10/15/19  1623   WBC 8.7   RBC 5.27*   HGB 15.0   HCT 45.1   MCV 86   MCH 28.5   MCHC 33.3   RDW 13.3          Creatinine   Date Value Ref Range Status   10/15/2019 0.98 0.52 - 1.04 mg/dL Final   ]    Liver Function Studies -   Recent Labs   Lab Test 10/15/19  1623 01/04/19  0853   PROTTOTAL  --  7.2   ALBUMIN  --  3.9   BILITOTAL  --  0.4   ALKPHOS  --  55   AST 20 22   ALT 40 31       Kathleen M Doege RN

## 2019-12-06 ENCOUNTER — OFFICE VISIT (OUTPATIENT)
Dept: INTERNAL MEDICINE | Facility: CLINIC | Age: 54
End: 2019-12-06
Payer: COMMERCIAL

## 2019-12-06 VITALS
OXYGEN SATURATION: 97 % | RESPIRATION RATE: 16 BRPM | WEIGHT: 247 LBS | SYSTOLIC BLOOD PRESSURE: 113 MMHG | HEART RATE: 75 BPM | HEIGHT: 67 IN | DIASTOLIC BLOOD PRESSURE: 72 MMHG | BODY MASS INDEX: 38.77 KG/M2 | TEMPERATURE: 98.2 F

## 2019-12-06 DIAGNOSIS — M06.041 RHEUMATOID ARTHRITIS INVOLVING BOTH HANDS WITH NEGATIVE RHEUMATOID FACTOR (H): Primary | ICD-10-CM

## 2019-12-06 DIAGNOSIS — F32.1 MAJOR DEPRESSIVE DISORDER, SINGLE EPISODE, MODERATE (H): ICD-10-CM

## 2019-12-06 DIAGNOSIS — F51.01 PRIMARY INSOMNIA: ICD-10-CM

## 2019-12-06 DIAGNOSIS — Z12.31 ENCOUNTER FOR SCREENING MAMMOGRAM FOR BREAST CANCER: ICD-10-CM

## 2019-12-06 DIAGNOSIS — M06.042 RHEUMATOID ARTHRITIS INVOLVING BOTH HANDS WITH NEGATIVE RHEUMATOID FACTOR (H): Primary | ICD-10-CM

## 2019-12-06 DIAGNOSIS — E78.5 HYPERLIPIDEMIA LDL GOAL <160: ICD-10-CM

## 2019-12-06 DIAGNOSIS — E03.9 HYPOTHYROIDISM, UNSPECIFIED TYPE: ICD-10-CM

## 2019-12-06 DIAGNOSIS — R60.0 EDEMA OF BOTH LEGS: ICD-10-CM

## 2019-12-06 DIAGNOSIS — F41.9 ANXIETY: ICD-10-CM

## 2019-12-06 PROCEDURE — 99214 OFFICE O/P EST MOD 30 MIN: CPT | Performed by: INTERNAL MEDICINE

## 2019-12-06 RX ORDER — LORAZEPAM 0.5 MG/1
TABLET ORAL
Qty: 20 TABLET | Refills: 0 | Status: SHIPPED | OUTPATIENT
Start: 2019-12-06 | End: 2020-03-09

## 2019-12-06 RX ORDER — HYDROCHLOROTHIAZIDE 25 MG/1
25 TABLET ORAL DAILY
Qty: 90 TABLET | Refills: 1 | Status: SHIPPED | OUTPATIENT
Start: 2019-12-06 | End: 2020-08-10

## 2019-12-06 RX ORDER — TRAZODONE HYDROCHLORIDE 50 MG/1
100 TABLET, FILM COATED ORAL
Qty: 180 TABLET | Refills: 1 | Status: SHIPPED | OUTPATIENT
Start: 2019-12-06 | End: 2020-08-10 | Stop reason: DRUGHIGH

## 2019-12-06 RX ORDER — LEVOTHYROXINE SODIUM 150 UG/1
150 TABLET ORAL DAILY
Qty: 115 TABLET | Refills: 1 | Status: SHIPPED | OUTPATIENT
Start: 2019-12-06 | End: 2020-08-17

## 2019-12-06 RX ORDER — ESCITALOPRAM OXALATE 20 MG/1
20 TABLET ORAL DAILY
Qty: 90 TABLET | Refills: 1 | Status: SHIPPED | OUTPATIENT
Start: 2019-12-06 | End: 2020-08-10

## 2019-12-06 ASSESSMENT — PATIENT HEALTH QUESTIONNAIRE - PHQ9
5. POOR APPETITE OR OVEREATING: SEVERAL DAYS
5. POOR APPETITE OR OVEREATING: SEVERAL DAYS
SUM OF ALL RESPONSES TO PHQ QUESTIONS 1-9: 8

## 2019-12-06 ASSESSMENT — ANXIETY QUESTIONNAIRES
6. BECOMING EASILY ANNOYED OR IRRITABLE: SEVERAL DAYS
GAD7 TOTAL SCORE: 8
IF YOU CHECKED OFF ANY PROBLEMS ON THIS QUESTIONNAIRE, HOW DIFFICULT HAVE THESE PROBLEMS MADE IT FOR YOU TO DO YOUR WORK, TAKE CARE OF THINGS AT HOME, OR GET ALONG WITH OTHER PEOPLE: SOMEWHAT DIFFICULT
6. BECOMING EASILY ANNOYED OR IRRITABLE: SEVERAL DAYS
7. FEELING AFRAID AS IF SOMETHING AWFUL MIGHT HAPPEN: MORE THAN HALF THE DAYS
2. NOT BEING ABLE TO STOP OR CONTROL WORRYING: SEVERAL DAYS
3. WORRYING TOO MUCH ABOUT DIFFERENT THINGS: SEVERAL DAYS
1. FEELING NERVOUS, ANXIOUS, OR ON EDGE: MORE THAN HALF THE DAYS
3. WORRYING TOO MUCH ABOUT DIFFERENT THINGS: SEVERAL DAYS
GAD7 TOTAL SCORE: 8
2. NOT BEING ABLE TO STOP OR CONTROL WORRYING: SEVERAL DAYS
5. BEING SO RESTLESS THAT IT IS HARD TO SIT STILL: NOT AT ALL
5. BEING SO RESTLESS THAT IT IS HARD TO SIT STILL: NOT AT ALL
7. FEELING AFRAID AS IF SOMETHING AWFUL MIGHT HAPPEN: MORE THAN HALF THE DAYS
1. FEELING NERVOUS, ANXIOUS, OR ON EDGE: MORE THAN HALF THE DAYS
IF YOU CHECKED OFF ANY PROBLEMS ON THIS QUESTIONNAIRE, HOW DIFFICULT HAVE THESE PROBLEMS MADE IT FOR YOU TO DO YOUR WORK, TAKE CARE OF THINGS AT HOME, OR GET ALONG WITH OTHER PEOPLE: SOMEWHAT DIFFICULT

## 2019-12-06 ASSESSMENT — MIFFLIN-ST. JEOR: SCORE: 1750.07

## 2019-12-06 NOTE — PROGRESS NOTES
Patient's instructions / PLAN:                                                        Plan:  1. Labs today - [address]   2. Continue same meds, same doses for now   3. Follow up in May for annual exam   4. Mammogram ( please call [phone] to schedule it)    ASSESSMENT & PLAN:                                                      (F32.1) Major depressive disorder, single episode, moderate (H)  (primary encounter diagnosis)  Comment: stable   Plan: CBC with platelets, Comprehensive metabolic         panel, Lipid panel reflex to direct LDL         Fasting, TSH with free T4 reflex, escitalopram         (LEXAPRO) 20 MG tablet          (E78.5) Hyperlipidemia LDL goal <160  Comment: borderline high in Jan 148, new labs   Plan: CBC with platelets, Comprehensive metabolic         panel, Lipid panel reflex to direct LDL         Fasting, TSH with free T4 reflex          (M06.041,  M06.042) Rheumatoid arthritis involving both hands with negative rheumatoid factor (H)  Comment: Controlled    Plan: CBC with platelets, Comprehensive metabolic         panel, Lipid panel reflex to direct LDL         Fasting, TSH with free T4 reflex          (E03.9) Hypothyroidism, unspecified type  (E03.8,  E06.3) Hypothyroidism due to Hashimoto's thyroiditis  Comment: TSH 0.04 in June, new labs  Plan: CBC with platelets, Comprehensive metabolic         panel, Lipid panel reflex to direct LDL         Fasting, TSH with free T4 reflex, levothyroxine (SYNTHROID/LEVOTHROID) 150 MCG         tablet          (F41.9) Anxiety  Comment: Controlled    Plan: escitalopram (LEXAPRO) 20 MG tablet, LORazepam         (ATIVAN) 0.5 MG tablet          (R60.0) Edema of both legs  Comment: Not controlled   Plan: hydrochlorothiazide (HYDRODIURIL) 25 MG tablet    (F51.01) Primary insomnia  Comment:   Plan: traZODone (DESYREL) 50 MG tablet          (Z12.31) Encounter for screening mammogram for breast cancer  Comment:   Plan: [place] Screen Bilateral w/Servando, CANCELED: MA     "     Screening Digital Bilateral          Chief Complaint:                                                      Follow up chronic medical problems      SUBJECTIVE:                                                    History of present illness     -- Formerly Dr. Barros's pt, would like to establish care with PCP    -- [facility] and [facility] for [facility]    Rheumatoid Arthritis   -- in the hands and feet   -- Dx formally 2 m ago  -- sees Rheumatologist Dr. Duran at the   -- has been on Methotrexate for about 6 weeks now, initially was on  Plaquenil, provided no relief  -- has Prednisone for flare ups,  has not taken in x 1 month  -- no further acute concerns today    Anxiety/Depression   -- she is doing well and can cope  -- lorazepam     Hypothyroidism   -- levothyroxine 300 mg on Monday and Friday, 150 mg daily all other days  -- Would like TSH checked    ROS:                                                      ROS: negative for fever, chills, cough, wheezes, chest pain, shortness of breath, vomiting, abdominal pain, Pos for leg swelling    This document serves as a record of the services and decisions personally performed and made by Dr. Timi MD. It was created on their behalf by Abe Hawkins, a trained medical scribe. The creation of this document is based on the provider's statements to the medical scribe.  Abe Hawkins December 6, 2019 10:59 AM    OBJECTIVE:                                                    Physical Exam :   Blood pressure 113/72, pulse 75, temperature 98.2  F (36.8  C), resp. rate 16, height 1.689 m (5' 6.5\"), weight 112 kg (247 lb), SpO2 97 %, not currently breastfeeding.     NAD, appears comfortable  Skin: no rashes   HEENT: PERRLA, EOMI, pink conjunctiva, anicteric sclerae, bilateral tympanic membranes are clinically normal, oropharynx is normal color  Neck: supple, no JVD, No thyroidmegaly. Lymph nodes nonpalpable cervical and supraclavicular.  Chest: clear to auscultation " bilaterally, good respiratory effort  Heart: S1 S2, RRR, no mgr appreciated  Abdomen: soft, not tender, no hepatosplenomegaly or masses appreciated, no abdominal bruit, present bowel sounds  Extremities: no edema  Neurologic: A, Ox3, no focal signs appreciated    PMHx: reviewed  Past Medical History:   Diagnosis Date     Antiphospholipid antibodies positive 12/14/2011     Arthritis      Baker's cyst of knee      Hypothyroidism      Obesity       PSHx: reviewed  Past Surgical History:   Procedure Laterality Date     Novasure endometrial ablation  10/2007        Meds: reviewed  Current Outpatient Medications   Medication Sig Dispense Refill     escitalopram (LEXAPRO) 20 MG tablet Take 1 tablet (20 mg) by mouth daily 90 tablet 0     hydrochlorothiazide (HYDRODIURIL) 25 MG tablet Take 1 tablet (25 mg) by mouth daily 90 tablet 0     hydroxychloroquine (PLAQUENIL) 200 MG tablet Take 1 tablet (200 mg) by mouth 2 times daily Annual Plaquenil toxicity eye screening required. 180 tablet 1     hydrOXYzine (ATARAX) 25 MG tablet TAKE 1-2 TABLET(S) BY MOUTH EVERY 6 HOURS AS NEEDED FOR ANXIETY 60 tablet 0     levothyroxine (SYNTHROID/LEVOTHROID) 150 MCG tablet Take 1 tablet (150 mcg) by mouth daily Take 300 mg by mouth on Monday and Friday. Take 150 mg by mouth all other days. 115 tablet 0     LORazepam (ATIVAN) 0.5 MG tablet TAKE ONE-HALF TO ONE TABLET BY MOUTH EVERY 8 HOURS AS NEEDED FOR ANXIETY 20 tablet 0     methotrexate 2.5 MG tablet Take 8 tablets (20 mg) by mouth every 7 days Labs every 8 - 12 weeks for refills. 32 tablet 1     predniSONE (DELTASONE) 5 MG tablet 4 tabs daily for 1 week, then 3 tabs daily for 1 week 50 tablet 2     traZODone (DESYREL) 50 MG tablet Take 2 tablets (100 mg) by mouth nightly as needed for sleep 180 tablet 0     spironolactone (ALDACTONE) 50 MG tablet Take 2 tablets (100 mg) by mouth daily 180 tablet 0       Soc Hx: reviewed  Fam Hx: reviewed    The information in this document, created by the  medical scribe for me, accurately reflects the services I personally performed and the decisions made by me. I have reviewed and approved this document for accuracy prior to leaving the patient care area.  December 6, 2019 12:16 PM     Harika Capellan MD  Internal Medicine

## 2019-12-06 NOTE — PATIENT INSTRUCTIONS
Plan:  1. Labs today - [address]   2. Continue same meds, same doses for now   3. Follow up in May for annual exam   4. Mammogram ( please call [phone] to schedule it)

## 2019-12-07 PROBLEM — E03.9 HYPOTHYROIDISM, UNSPECIFIED TYPE: Status: ACTIVE | Noted: 2019-12-07

## 2019-12-07 PROBLEM — E78.5 HYPERLIPIDEMIA LDL GOAL <160: Status: ACTIVE | Noted: 2019-12-07

## 2019-12-07 PROBLEM — M06.042 RHEUMATOID ARTHRITIS INVOLVING BOTH HANDS WITH NEGATIVE RHEUMATOID FACTOR (H): Status: ACTIVE | Noted: 2019-12-07

## 2019-12-07 PROBLEM — M06.041 RHEUMATOID ARTHRITIS INVOLVING BOTH HANDS WITH NEGATIVE RHEUMATOID FACTOR (H): Status: ACTIVE | Noted: 2019-12-07

## 2019-12-07 ASSESSMENT — ANXIETY QUESTIONNAIRES: GAD7 TOTAL SCORE: 8

## 2019-12-13 ENCOUNTER — HOSPITAL ENCOUNTER (OUTPATIENT)
Dept: MAMMOGRAPHY | Facility: CLINIC | Age: 54
Discharge: HOME | End: 2019-12-13
Attending: INTERNAL MEDICINE | Admitting: INTERNAL MEDICINE
Payer: COMMERCIAL

## 2019-12-13 DIAGNOSIS — Z12.31 ENCOUNTER FOR SCREENING MAMMOGRAM FOR BREAST CANCER: ICD-10-CM

## 2019-12-13 PROCEDURE — 77063 BREAST TOMOSYNTHESIS BI: CPT

## 2020-01-15 DIAGNOSIS — F41.9 ANXIETY: ICD-10-CM

## 2020-01-15 DIAGNOSIS — F32.1 MAJOR DEPRESSIVE DISORDER, SINGLE EPISODE, MODERATE (H): ICD-10-CM

## 2020-01-16 RX ORDER — ESCITALOPRAM OXALATE 10 MG/1
TABLET ORAL
Start: 2020-01-16

## 2020-01-16 NOTE — TELEPHONE ENCOUNTER
Request for Lexapro 10 mg tabs.    Dose increased last OV 12/6/19: 20 mg one daily.  Rx sent 12/6/19 for #90 with 1 refill.    Minda Dominique RN

## 2020-02-16 ENCOUNTER — HEALTH MAINTENANCE LETTER (OUTPATIENT)
Age: 55
End: 2020-02-16

## 2020-02-17 ENCOUNTER — MYC REFILL (OUTPATIENT)
Dept: INTERNAL MEDICINE | Facility: CLINIC | Age: 55
End: 2020-02-17

## 2020-02-17 ENCOUNTER — MYC REFILL (OUTPATIENT)
Dept: RHEUMATOLOGY | Facility: CLINIC | Age: 55
End: 2020-02-17

## 2020-02-17 DIAGNOSIS — F41.9 ANXIETY: ICD-10-CM

## 2020-02-17 DIAGNOSIS — M06.9 RHEUMATOID ARTHRITIS, INVOLVING UNSPECIFIED SITE, UNSPECIFIED RHEUMATOID FACTOR PRESENCE: ICD-10-CM

## 2020-02-17 DIAGNOSIS — E03.9 HYPOTHYROIDISM, UNSPECIFIED TYPE: ICD-10-CM

## 2020-02-17 DIAGNOSIS — F51.01 PRIMARY INSOMNIA: ICD-10-CM

## 2020-02-17 RX ORDER — LEVOTHYROXINE SODIUM 150 UG/1
150 TABLET ORAL DAILY
Qty: 115 TABLET | Refills: 1 | Status: CANCELLED | OUTPATIENT
Start: 2020-02-17

## 2020-02-17 RX ORDER — TRAZODONE HYDROCHLORIDE 50 MG/1
100 TABLET, FILM COATED ORAL
Qty: 180 TABLET | Refills: 1 | Status: CANCELLED | OUTPATIENT
Start: 2020-02-17

## 2020-02-17 RX ORDER — PREDNISONE 5 MG/1
TABLET ORAL
Qty: 50 TABLET | Refills: 2 | Status: SHIPPED | OUTPATIENT
Start: 2020-02-17 | End: 2020-11-20

## 2020-02-17 RX ORDER — LORAZEPAM 0.5 MG/1
TABLET ORAL
Qty: 20 TABLET | Refills: 0 | Status: CANCELLED | OUTPATIENT
Start: 2020-02-17

## 2020-02-19 NOTE — TELEPHONE ENCOUNTER
Spoke with pharmacy.  States Levothyroxine and Trazodone have a refill remaining.    Lorazepam was e-scribed 12-6-19 but was not picked up by patient.  It is still refillable per Kerrie at pharmacy.    Patient informed via [email or web address].

## 2020-03-09 ENCOUNTER — MYC REFILL (OUTPATIENT)
Dept: INTERNAL MEDICINE | Facility: CLINIC | Age: 55
End: 2020-03-09

## 2020-03-09 DIAGNOSIS — F41.9 ANXIETY: ICD-10-CM

## 2020-03-11 NOTE — TELEPHONE ENCOUNTER
Controlled Substance Refill Request for Ativan   Problem List Complete:  No     PROVIDER TO CONSIDER COMPLETION OF PROBLEM LIST AND OVERVIEW/CONTROLLED SUBSTANCE AGREEMENT    Last Written Prescription Date:  12/6/19  Last Fill Quantity: 20,   # refills: 0      Last Office Visit with [facility] primary care provider: 12/6/19    Future Office visit:     Controlled substance agreement:   Encounter-Level CSA:    There are no encounter-level csa.     Patient-Level CSA:    There are no patient-level csa.         Last Urine Drug Screen: No results found for: CDAUT, No results found for: COMDAT, No results found for: THC13, PCP13, COC13, MAMP13, OPI13, AMP13, BZO13, TCA13, MTD13, BAR13, OXY13, PPX13, BUP13     Processing:  Rx to be electronically transmitted to pharmacy by provider      [email or web address]       checked in past 3 months?  Yes 3/11/20- no concerns     Routing refill request to provider for review/approval because:  Drug not on the [facility] refill protocol

## 2020-03-12 RX ORDER — LORAZEPAM 0.5 MG/1
TABLET ORAL
Qty: 20 TABLET | Refills: 0 | Status: SHIPPED | OUTPATIENT
Start: 2020-03-12 | End: 2020-08-10

## 2020-03-23 ENCOUNTER — MYC REFILL (OUTPATIENT)
Dept: RHEUMATOLOGY | Facility: CLINIC | Age: 55
End: 2020-03-23

## 2020-03-23 DIAGNOSIS — M19.90 INFLAMMATORY ARTHRITIS: ICD-10-CM

## 2020-03-23 RX ORDER — HYDROXYCHLOROQUINE SULFATE 200 MG/1
200 TABLET, FILM COATED ORAL 2 TIMES DAILY
Qty: 180 TABLET | Refills: 1 | Status: SHIPPED | OUTPATIENT
Start: 2020-03-23 | End: 2020-08-06

## 2020-06-19 DIAGNOSIS — R60.0 EDEMA OF BOTH LEGS: ICD-10-CM

## 2020-06-19 NOTE — TELEPHONE ENCOUNTER
Routing to MA/TC pool. The Pt is due for a visit with PCP  Pt needs to establish care with a new provider  Nicola Castillo RN, BSN

## 2020-06-23 NOTE — TELEPHONE ENCOUNTER
2nd attempt:  Left message for patient requesting a call back to schedule virtual visit, prior to refills.  Provided clinic number.     Dottie Albrecht

## 2020-06-26 RX ORDER — SPIRONOLACTONE 50 MG/1
TABLET, FILM COATED ORAL
Qty: 180 TABLET | Refills: 0 | Status: SHIPPED | OUTPATIENT
Start: 2020-06-26 | End: 2020-08-10

## 2020-06-26 NOTE — TELEPHONE ENCOUNTER
Routing refill request to provider for review/approval because:  Labs not current:  WIL VIEYRA RN on 6/26/2020 at 8:30 AM

## 2020-06-26 NOTE — TELEPHONE ENCOUNTER
Med refilled. Note has already been sent to have her schedule a visit prior to additional refills.

## 2020-08-06 ENCOUNTER — MYC REFILL (OUTPATIENT)
Dept: RHEUMATOLOGY | Facility: CLINIC | Age: 55
End: 2020-08-06

## 2020-08-06 DIAGNOSIS — M19.90 INFLAMMATORY ARTHRITIS: ICD-10-CM

## 2020-08-06 NOTE — LETTER
Amita Finley  [address] [address]    Dear Amita Finley,     Regular eye exams are required while taking hydroxychloroquine (Plaquenil). Eye exams should be completed by an eye specialist who is experienced in monitoring for hydroxychloroquine toxicity (a rare effect of the drug that can damage your eyes and vision).  These may be yearly or as determined by your eye specialist.     Although vision problems and loss of sight while taking hydroxychloroquine are very rare, notify your doctor immediately if you notice changes in your vision. The goal of screening is to detect toxicity before your vision is significantly or noticeably impacted. Failing to get proper screening exams puts you at risk of vision changes which may or may not be reversible.    Per the American Academy of Ophthalmology recommendations (2016), screening tests performed may include a 10-2 visual field test, spectral-domain optical coherence tomography (SD OCT), or other screening tests as determined by the eye specialist, including a multifocal electroretinogram (mfERG) or fundus auto-fluorescence (FAF).    We received a refill request from your pharmacy. A copy of your current eye exam was not found in your medical record. Your hydroxychloroquine prescription has been refilled with a limited supply pending confirmation you have had an eye exam to test for hydroxychloroquine toxicity.      We encourage you to bring this letter to your eye exam to discuss the exams that will be performed during your visit. Please request your eye clinic fax or mail a copy of your eye exam report to our clinic indicating that testing was completed for hydroxychloroquine toxicity screening. The exam notes must specifically comment on the potential for hydroxychloroquine toxicity and outline recommended follow-up.    If you have questions about hydroxychloroquine or the information in this letter, please call the clinic at  [phone] or talk to your provider at your next office visit.                        2    Eye Specialist Letter  Dear Eye Specialist,  To ensure safe use of Plaquenil (hydroxychloroquine), we are requesting your assistance in providing the following information. Please return this form to our clinic via mail or fax, or incorporate this information into your visit summary. Your note must indicate whether or not there is evidence of toxicity from Plaquenil use. For questions regarding this form, please call [phone].  Patient Name:   :             Date of Exam:    The following exams were completed during this visit in accordance with the American Academy of Ophthalmology recommendations (2016):  ? 10-2 automated visual field  ? Spectral-domain optical coherence tomography (SD OCT)  ? Multifocal electroretinogram (mfERG)  ? Fundus autofluorescence (FAF)  ? Other (please specify)  Please select from the following:  ? The findings from the above exams are not suggestive of toxicity from Plaquenil (hydroxychloroquine).  ? The findings from the above exams may suggest toxicity from Plaquenil (hydroxychloroquine).  Directly contact the clinic and fax this form.  Please provide additional guidance on whether or not the medication may be continued from your perspective:  Date of next recommended Plaquenil (hydroxychloroquine) screening eye exam:   ? 5 years  ? 1 year  ? 6 months  Other (please specify):   Eye Specialist Name (print):                                                                Date:  Eye Specialist Signature:

## 2020-08-10 ENCOUNTER — VIRTUAL VISIT (OUTPATIENT)
Dept: INTERNAL MEDICINE | Facility: CLINIC | Age: 55
End: 2020-08-10
Payer: COMMERCIAL

## 2020-08-10 DIAGNOSIS — R60.0 EDEMA OF BOTH LEGS: ICD-10-CM

## 2020-08-10 DIAGNOSIS — F41.9 ANXIETY: ICD-10-CM

## 2020-08-10 DIAGNOSIS — M06.041 RHEUMATOID ARTHRITIS INVOLVING BOTH HANDS WITH NEGATIVE RHEUMATOID FACTOR (H): ICD-10-CM

## 2020-08-10 DIAGNOSIS — M06.042 RHEUMATOID ARTHRITIS INVOLVING BOTH HANDS WITH NEGATIVE RHEUMATOID FACTOR (H): ICD-10-CM

## 2020-08-10 DIAGNOSIS — Z13.1 SCREENING FOR DIABETES MELLITUS: ICD-10-CM

## 2020-08-10 DIAGNOSIS — F51.01 PRIMARY INSOMNIA: ICD-10-CM

## 2020-08-10 DIAGNOSIS — R76.0 ANTIPHOSPHOLIPID ANTIBODY POSITIVE: Primary | ICD-10-CM

## 2020-08-10 PROCEDURE — 99213 OFFICE O/P EST LOW 20 MIN: CPT | Mod: 95 | Performed by: INTERNAL MEDICINE

## 2020-08-10 RX ORDER — LORAZEPAM 0.5 MG/1
TABLET ORAL
Qty: 20 TABLET | Refills: 0 | Status: SHIPPED | OUTPATIENT
Start: 2020-08-10 | End: 2020-11-20

## 2020-08-10 RX ORDER — TRAZODONE HYDROCHLORIDE 100 MG/1
100 TABLET ORAL AT BEDTIME
Qty: 90 TABLET | Refills: 1 | Status: SHIPPED | OUTPATIENT
Start: 2020-08-10 | End: 2020-11-20

## 2020-08-10 RX ORDER — HYDROXYCHLOROQUINE SULFATE 200 MG/1
200 TABLET, FILM COATED ORAL 2 TIMES DAILY
Qty: 180 TABLET | Refills: 1 | Status: SHIPPED | OUTPATIENT
Start: 2020-08-10 | End: 2020-09-04

## 2020-08-10 RX ORDER — HYDROCHLOROTHIAZIDE 25 MG/1
25 TABLET ORAL DAILY
Qty: 90 TABLET | Refills: 1 | Status: SHIPPED | OUTPATIENT
Start: 2020-08-10 | End: 2020-11-20

## 2020-08-10 RX ORDER — SPIRONOLACTONE 50 MG/1
TABLET, FILM COATED ORAL
Qty: 180 TABLET | Refills: 1 | Status: SHIPPED | OUTPATIENT
Start: 2020-08-10 | End: 2020-11-20

## 2020-08-10 NOTE — PROGRESS NOTES
"Amita Finley is a 54 year old female who is being evaluated via a billable video visit.      The patient has been notified of following:     \"This video visit will be conducted via a call between you and your physician/provider. We have found that certain health care needs can be provided without the need for an in-person physical exam.  This service lets us provide the care you need with a video conversation.  If a prescription is necessary we can send it directly to your pharmacy.  If lab work is needed we can place an order for that and you can then stop by our lab to have the test done at a later time.    Video visits are billed at different rates depending on your insurance coverage.  Please reach out to your insurance provider with any questions.    If during the course of the call the physician/provider feels a video visit is not appropriate, you will not be charged for this service.\"    Patient has given verbal consent for Video visit? Yes  How would you like to obtain your AVS? MyChart  If you are dropped from the video visit, the video invite should be resent to: Text to cell phone: [phone], patient would like to use [employer] if possible.  Will anyone else be joining your video visit? No        This is a VIDEO ( using [employer])  encounter with the patient.       Location of the provider : office    Location of the patient : home      09:06 --- 09:20          Dr Capellan's note      Patient's instructions / PLAN:                                                        Plan:  1. Continue same meds, same doses for now   2. Please make a lab appointment for fasting labs        ASSESSMENT & PLAN:                                                      (R60.0) Edema of both legs  Comment:   Plan: hydrochlorothiazide (HYDRODIURIL) 25 MG tablet,        spironolactone (ALDACTONE) 50 MG tablet            (F41.9) Anxiety  Comment: Controlled    Plan: LORazepam (ATIVAN) 0.5 MG tablet            (F51.01) Primary " insomnia  Comment:   Plan: traZODone (DESYREL) 100 MG tablet            (M06.041,  M06.042) Rheumatoid arthritis involving both hands with negative rheumatoid factor (H)  (R76.0) Antiphospholipid antibody positive   Comment:   Plan: f/u w rheumatology        Chief complaint:                                                      Follow up chronic medical problems      SUBJECTIVE:                                                    History of present illness:    Nurse note:      Amita Finley is a 54 year old female who presents today via video visit for the following health issues:    Rheumatology - dr Lucio Duran       Hyperlipidemia Follow-Up      Are you regularly taking any medication or supplement to lower your cholesterol?   No    Are you having muscle aches or other side effects that you think could be caused by your cholesterol lowering medication?  No      How many servings of fruits and vegetables do you eat daily?  2-3    On average, how many sweetened beverages do you drink each day (Examples: soda, juice, sweet tea, etc.  Do NOT count diet or artificially sweetened beverages)?   0    How many days per week do you exercise enough to make your heart beat faster? 3 or less    How many minutes a day do you exercise enough to make your heart beat faster? 9 or less    How many days per week do you miss taking your medication? 0      Review of Systems:                                                      ROS: negative for fever, chills, cough, wheezes, chest pain, shortness of breath, vomiting, abdominal pain, leg swelling     A 10-point review of systems was obtained.  Those pertinent are above and in the in the Subjective section.  The rest of the systems are negative.           OBJECTIVE:           An actual physical exam can't be done during phone visit   A limited exam can sometimes be performed by video visit       PMHx: reviewed  Past Medical History:   Diagnosis Date     Antiphospholipid  antibodies positive 12/14/2011     Arthritis      Baker's cyst of knee      Hypothyroidism      Obesity       PSHx: reviewed  Past Surgical History:   Procedure Laterality Date     Novasure endometrial ablation  10/2007        Meds: reviewed  Current Outpatient Medications   Medication Sig Dispense Refill     hydrochlorothiazide (HYDRODIURIL) 25 MG tablet Take 1 tablet (25 mg) by mouth daily 90 tablet 1     hydroxychloroquine (PLAQUENIL) 200 MG tablet Take 1 tablet (200 mg) by mouth 2 times daily Annual Plaquenil toxicity eye screening required. 180 tablet 1     levothyroxine (SYNTHROID/LEVOTHROID) 150 MCG tablet Take 1 tablet (150 mcg) by mouth daily Take 300 mg by mouth on Monday and Friday. Take 150 mg by mouth all other days. 115 tablet 1     LORazepam (ATIVAN) 0.5 MG tablet TAKE ONE-HALF TO ONE TABLET BY MOUTH EVERY 8 HOURS AS NEEDED FOR ANXIETY 20 tablet 0     predniSONE (DELTASONE) 5 MG tablet 4 tabs daily for 1 week, then 3 tabs daily for 1 week 50 tablet 2     spironolactone (ALDACTONE) 50 MG tablet TAKE TWO TABLETS BY MOUTH ONCE DAILY 180 tablet 0     traZODone (DESYREL) 50 MG tablet Take 2 tablets (100 mg) by mouth nightly as needed for sleep 180 tablet 1       Soc Hx: reviewed  Fam Hx: reviewed          Harika Capellan MD  Internal Medicine

## 2020-08-10 NOTE — TELEPHONE ENCOUNTER
hydroxychloroquine (PLAQUENIL) 200 MG tablet  Last Written Prescription Date:  3/23/20  Last Fill Quantity: 180,   # refills: 1  Last Office Visit : 10/4/19  Future Office visit:  9/4/20    Eye exam: last found 12/18  letter sent    Routing refill request to provider for review/approval because: eye exam not found.

## 2020-08-10 NOTE — PATIENT INSTRUCTIONS
Plan:  1. Continue same meds, same doses for now   2. Please make a lab appointment for fasting labs

## 2020-08-11 ENCOUNTER — HOSPITAL ENCOUNTER (OUTPATIENT)
Dept: LAB | Facility: CLINIC | Age: 55
Discharge: HOME | End: 2020-08-11
Attending: INTERNAL MEDICINE | Admitting: INTERNAL MEDICINE
Payer: COMMERCIAL

## 2020-08-11 DIAGNOSIS — Z13.1 SCREENING FOR DIABETES MELLITUS: ICD-10-CM

## 2020-08-11 DIAGNOSIS — F32.1 MAJOR DEPRESSIVE DISORDER, SINGLE EPISODE, MODERATE (H): ICD-10-CM

## 2020-08-11 DIAGNOSIS — M06.042 RHEUMATOID ARTHRITIS INVOLVING BOTH HANDS WITH NEGATIVE RHEUMATOID FACTOR (H): ICD-10-CM

## 2020-08-11 DIAGNOSIS — E03.9 HYPOTHYROIDISM, UNSPECIFIED TYPE: ICD-10-CM

## 2020-08-11 DIAGNOSIS — M06.041 RHEUMATOID ARTHRITIS INVOLVING BOTH HANDS WITH NEGATIVE RHEUMATOID FACTOR (H): ICD-10-CM

## 2020-08-11 DIAGNOSIS — E78.5 HYPERLIPIDEMIA LDL GOAL <160: ICD-10-CM

## 2020-08-11 LAB
ALBUMIN SERPL-MCNC: 3.7 G/DL (ref 3.4–5)
ALP SERPL-CCNC: 71 U/L (ref 40–150)
ALT SERPL W P-5'-P-CCNC: 32 U/L (ref 0–50)
ANION GAP SERPL CALCULATED.3IONS-SCNC: 6 MMOL/L (ref 3–14)
AST SERPL W P-5'-P-CCNC: 17 U/L (ref 0–45)
BILIRUB SERPL-MCNC: 0.5 MG/DL (ref 0.2–1.3)
BUN SERPL-MCNC: 12 MG/DL (ref 7–30)
CALCIUM SERPL-MCNC: 8.6 MG/DL (ref 8.5–10.1)
CHLORIDE SERPL-SCNC: 103 MMOL/L (ref 94–109)
CO2 SERPL-SCNC: 27 MMOL/L (ref 20–32)
CREAT SERPL-MCNC: 1.04 MG/DL (ref 0.52–1.04)
ERYTHROCYTE [DISTWIDTH] IN BLOOD BY AUTOMATED COUNT: 12.7 % (ref 10–15)
GFR SERPL CREATININE-BSD FRML MDRD: 61 ML/MIN/{1.73_M2}
GLUCOSE SERPL-MCNC: 87 MG/DL (ref 70–99)
HBA1C MFR BLD: 5 % (ref 0–5.6)
HCT VFR BLD AUTO: 45.5 % (ref 35–47)
HGB BLD-MCNC: 14.9 G/DL (ref 11.7–15.7)
MCH RBC QN AUTO: 28.5 PG (ref 26.5–33)
MCHC RBC AUTO-ENTMCNC: 32.7 G/DL (ref 31.5–36.5)
MCV RBC AUTO: 87 FL (ref 78–100)
PLATELET # BLD AUTO: 228 10E9/L (ref 150–450)
POTASSIUM SERPL-SCNC: 3.4 MMOL/L (ref 3.4–5.3)
PROT SERPL-MCNC: 7.4 G/DL (ref 6.8–8.8)
RBC # BLD AUTO: 5.23 10E12/L (ref 3.8–5.2)
SODIUM SERPL-SCNC: 136 MMOL/L (ref 133–144)
T4 FREE SERPL-MCNC: 1.82 NG/DL (ref 0.76–1.46)
TSH SERPL DL<=0.005 MIU/L-ACNC: 0.09 MU/L (ref 0.4–4)
WBC # BLD AUTO: 6.4 10E9/L (ref 4–11)

## 2020-08-11 PROCEDURE — 84439 ASSAY OF FREE THYROXINE: CPT | Performed by: INTERNAL MEDICINE

## 2020-08-11 PROCEDURE — 85027 COMPLETE CBC AUTOMATED: CPT | Performed by: INTERNAL MEDICINE

## 2020-08-11 PROCEDURE — 84443 ASSAY THYROID STIM HORMONE: CPT | Performed by: INTERNAL MEDICINE

## 2020-08-11 PROCEDURE — 80053 COMPREHEN METABOLIC PANEL: CPT | Performed by: INTERNAL MEDICINE

## 2020-08-11 PROCEDURE — 83036 HEMOGLOBIN GLYCOSYLATED A1C: CPT | Performed by: INTERNAL MEDICINE

## 2020-08-11 PROCEDURE — 36415 COLL VENOUS BLD VENIPUNCTURE: CPT | Performed by: INTERNAL MEDICINE

## 2020-08-13 ENCOUNTER — TELEPHONE (OUTPATIENT)
Dept: OPTOMETRY | Facility: CLINIC | Age: 55
End: 2020-08-13

## 2020-08-13 DIAGNOSIS — Z79.899 HIGH RISK MEDICATION USE: Primary | ICD-10-CM

## 2020-08-13 NOTE — TELEPHONE ENCOUNTER
[facility] I let pt know that Dr. Andrade doesn't do that, and that she usually refers somewhere else since we don't have an Ophthalmologist within the system that is in the [place]. Pt stated she would like to get a referral then for it.  Please put in a referral and let her know when it has been completed.

## 2020-08-13 NOTE — TELEPHONE ENCOUNTER
[facility]  [address]  [place], [place] [id number] [phone]

## 2020-08-13 NOTE — TELEPHONE ENCOUNTER
LVM for pt to call [facility]    Nereida Kitchen on 8/13/2020 at 3:42 PM

## 2020-08-16 ENCOUNTER — MYC MEDICAL ADVICE (OUTPATIENT)
Dept: INTERNAL MEDICINE | Facility: CLINIC | Age: 55
End: 2020-08-16

## 2020-08-16 DIAGNOSIS — E03.9 HYPOTHYROIDISM, UNSPECIFIED TYPE: Primary | ICD-10-CM

## 2020-08-16 DIAGNOSIS — E03.9 HYPOTHYROIDISM, UNSPECIFIED TYPE: ICD-10-CM

## 2020-08-16 NOTE — TELEPHONE ENCOUNTER
Pt would like to increase her levothyroxine from 150mcg/day up to 150mcg five days per week and 225mcg two days per week, and then recheck her level in 3 months. Is wondering if Dr. Capellan would be ok with this plan?     She would also like to switch back to the brand-name Synthroid.   Message also sent to pt asking her to confirm if she is taking this on an empty stomach every day.

## 2020-08-17 RX ORDER — LEVOTHYROXINE SODIUM 150 UG/1
150 TABLET ORAL DAILY
Qty: 115 TABLET | Refills: 1 | Status: SHIPPED | OUTPATIENT
Start: 2020-08-17 | End: 2020-11-20

## 2020-09-02 ENCOUNTER — MYC MEDICAL ADVICE (OUTPATIENT)
Dept: OPTOMETRY | Facility: CLINIC | Age: 55
End: 2020-09-02

## 2020-09-03 NOTE — PROGRESS NOTES
[facility]  Rheumatology Clinic  Lucio Duran MD  2020     Name: Amita Finley  MRN: [id number]  Age: 53 year old  : 1965  Referring provider: Mark Torres     Assessment and Plan:   # Seronegative inflammatory arthritis:   Patient relates persistent symmetrical small joint hand and foot pain, as well as prolonged morning stiffness..  Video exam shows no gross synovial thickening at multiple MCPs, thumb MPs, left ankle, and multiple MTPs.    Blood work on 2019 showed creatinine, CRP, CCP, RF, uric acid, CBC, lupus panel, NAEL, anti-cardiolipin antibodies, anti-B2GP1, MARIO panel all negative or normal. Laboratory evaluation on 2020 showed normal comprehensive metabolic panel; TSH was suppressed at 0.09; CBC was normal.  X-rays of L hands in 2019 showed no evidence of erosions or other bony abnormalities.    Your negative rheumatoid arthritis is active.  The absence of RF and CCP, and past normal hand xrays and inflammatory marker results reflect good-prognosis disease. I think that she has experienced modest improvement with hydroxychloroquine monotherapy, but symptoms and signs of inflammatory joint disease persist.  I recommend resuming methotrexate for better control of inflammatory joint symptoms.  I recommend resuming 20 mg weekly. while on the drug, she should undergo every 3 month CBC, LFTs, and creatinine. She should restrict alcohol intake to 2 drinks per week and she may use a B-complex multivitamin daily to prevent mucosal irritation. I recommend continuing plaquenil 400 mg daily and obtaining screening toxicity exam annually. Intermittent prednisone may be used 3-4 times annually for flares of joint pain.    Follow-up: Return in about 4 months (around 2021).     HPI:   Amita Finley follows up for inflammatory arthritis.  She was last seen in 2019, when arthritis was thought to be active.  I recommended continuing  "hydroxychloroquine 400 mg daily, and starting methotrexate 20 mg once weekly.    Interval history 09-:    After the last visit, she started methotrexate as instructed.  She had a bad reaction to methotrexate with her first dose with \"feelling weird\" for several days.  She continue the methotrexate however, and still had some mild queasiness and not feeling right for several months.  She persisted using methotrexate however, and she noted improved mobilty with walking longer distance better, and she also had decreased morning stiffness as well as improved ability to walk up and down stairs.  When the COVID-19 epidemic struck, her  was hospitalized with COVID-19, and she stopped taking the methotrexate in early February.  She has not restarted the drug    Today, her elbows are sore. When she uses her arms or leans on her elbows, pain is worse. Pain is worse in the morning. Early morning stiffness is 90 minutes.  Joint pain affects ankles, feet, and wrists as well; there is variable subtle swelling.  She uses ibuprofen and tylenol once a month.     She has continued plaquenil 400 mg daily. She is working on getting eye examination for plaquenil toxicity.  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 Torres, who she last saw on 06/07/2019. He noted history of rheumatoid arthritis diagnosed 10+ years ago, which had worsened in the 3 months prior. She had joint pain in the index fingers, middle fingers, small fingers, elbows, great toe on the left, forefoot on the right, and neck. Prednisone and plaquenil were started.      Today she reports that she has been using plaquenil for about 3 months but continues to have hand and foot pain. She is right handed but the left hand always had more pain and swelling compared to the right. The 2nd and 3rd knuckles are the most painful, although for a " period of time the 4th and 5th knuckles were most painful. Hand cramping/claw hand formation, which was present for years, has decreased since starting prednisone. She notes that she was hopping on her left ankle while moving some furniture about a month and a half and since the ankle has been hot and painful, worse with weight bearing. Her joint pain is worse in the morning and accompanied bu about 2-2.5 hours of early morning stiffness.     She uses ibuprofen occasionally but tries to avoid this due to problems with leg edema. She used prednisone when she first started plaquenil and had about 2 weeks of significant improvement in joint pain. She has used medrol Dosepak on and off but had never been on other immunomodulatory medications. She has used Lovenox during pregnancy. She uses hydrochlorothiazide and spironolactone for leg swelling. She also takes thyroid replacement, which was adjusted after labs in 06/2019. She uses trazodone at night for sleep. She has had a right knee injection in the past for knee pain, which was successful.     She denies sores in the mouth or nose. She has had small itchy nodules on the fingers but denies rashes otherwise. She also denies cough, shortness of breath, abdominal pain, nausea, vomiting, diarrhea, constipation, or numbness, weakness, tingling in the arms or legs.     Review of Systems:   Pertinent items are noted in HPI or as below, remainder of complete ROS is negative.      No recent problems with hearing or vision. No swallowing problems.   No breathing difficulty, shortness of breath, coughing, or wheezing  No chest pain or palpitations  No heart burn, indigestion, abdominal pain, nausea, vomiting, diarrhea  No urination problems, no bloody, cloudy urine, no dysuria  No numbing, tingling, weakness  No headaches or confusion  No rashes. No easy bleeding or bruising.     Active Medications:     Current Outpatient Medications:      folic acid (FOLVITE) 1 MG tablet, Take  1-3 tabs by mouth daily., Disp: 90 tablet, Rfl: 3     hydrochlorothiazide (HYDRODIURIL) 25 MG tablet, Take 1 tablet (25 mg) by mouth daily, Disp: 90 tablet, Rfl: 1     hydroxychloroquine (PLAQUENIL) 200 MG tablet, Take 1 tablet (200 mg) by mouth 2 times daily Get annual eye exams for hydroxychloroquine (Plaquenil) monitoring and fax to [phone], Disp: 180 tablet, Rfl: 1     levothyroxine (SYNTHROID/LEVOTHROID) 150 MCG tablet, Take 1 tablet (150 mcg) by mouth daily Take 300 mg by mouth on Monday and Friday. Take 150 mg by mouth all other days., Disp: 115 tablet, Rfl: 1     LORazepam (ATIVAN) 0.5 MG tablet, TAKE ONE-HALF TO ONE TABLET BY MOUTH EVERY 8 HOURS AS NEEDED FOR ANXIETY, Disp: 20 tablet, Rfl: 0     methotrexate 2.5 MG tablet, Take 8 tablets (20 mg) by mouth every 7 days Labs every 8 - 12 weeks for refills., Disp: 32 tablet, Rfl: 4     predniSONE (DELTASONE) 5 MG tablet, 4 tabs daily for 1 week, then 3 tabs daily for 1 week, Disp: 50 tablet, Rfl: 2     spironolactone (ALDACTONE) 50 MG tablet, TAKE TWO TABLETS BY MOUTH ONCE DAILY, Disp: 180 tablet, Rfl: 1     traZODone (DESYREL) 100 MG tablet, Take 1 tablet (100 mg) by mouth At Bedtime, Disp: 90 tablet, Rfl: 1      Allergies:   Ciprofloxacin and Augmentin      Past Medical History:  Antiphospholipid antibodies positive   Inflammatory arthritis    Baker's cyst of knee   Hypothyroidism   Obesity   Hypothyroidism   Lump/mass in breast   Rosacea   Back pain   Leg edema   Anxiety   Depression   Dry eye     Past Surgical History:  Novasure endometrial ablation 2007     Family History:   Mother: GI disease, hypertension  Father: coronary artery disease, diabetes mellitus, MI, glaucoma, hypertension   Maternal grandmother: cancer   Sister: GI disease   Daughter: thyroid disease     Social History:   Former smoker, 30 pack years, quit in 1996.   3 drinks per week.   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 She works 60 hours per  "week. 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   She has two children.     Physical Exam:   Ht 1.689 m (5' 6.5\")   Wt 102.1 kg (225 lb)   BMI 35.77 kg/m     Wt Readings from Last 4 Encounters:   09/04/20 102.1 kg (225 lb)   12/06/19 112 kg (247 lb)   10/04/19 116.3 kg (256 lb 8 oz)   07/18/19 111.1 kg (245 lb)     Constitutional: Well-developed, appearing stated age; cooperative  Eyes: Normal EOM, PERRLA, vision, conjunctiva, sclera  ENT: Normal external ears, nose, hearing, lips, teeth, gums, throat. No mucous membrane lesions, normal saliva pool  Neck: No mass or thyroid enlargement  MS: Some subtle fusiform swelling in multiple digits.  Visualized MCP, PIP, wrist, and elbow range of motion is normal.  The TMJ, neck, shoulder, elbow, spine, hip, knee joints were examined and found normal. No active synovitis or altered joint anatomy. Full joint ROM. Normal  strength. No dactylitis,  tenosynovitis, enthesopathy.  Skin: No nail pitting, alopecia, rash, nodules or lesions  Neuro: Normal cranial nerves  Psych: Normal judgement, orientation, memory, affect.     Laboratory:   RHEUM RESULTS Latest Ref Rng & Units 6/7/2019 10/15/2019 8/11/2020   DNA-DS <10 IU/mL 1 - -   SED RATE 0 - 30 mm/h 9 - -   CRP, INFLAMMATION 0.0 - 8.0 mg/L 6.0 4.8 -   RHEUMATOID FACTOR <20 IU/mL <20 - -   NAEL SCREEN BY EIA <1.0 - - -   AST 0 - 45 U/L - 20 17   ALT 0 - 50 U/L - 40 32   ALBUMIN 3.4 - 5.0 g/dL - - 3.7   WBC 4.0 - 11.0 10e9/L 7.6 8.7 6.4   RBC 3.8 - 5.2 10e12/L 5.09 5.27(H) 5.23(H)   HGB 11.7 - 15.7 g/dL 14.3 15.0 14.9   HCT 35.0 - 47.0 % 42.7 45.1 45.5   MCV 78 - 100 fl 84 86 87   MCHC 31.5 - 36.5 g/dL 33.5 33.3 32.7   RDW 10.0 - 15.0 % 13.4 13.3 12.7    - 450 10e9/L 248 275 228   CREATININE 0.52 - 1.04 mg/dL 0.87 0.98 1.04   GFR ESTIMATE, IF BLACK >60 mL/min/[1.73:m2] 88 76 70   GFR ESTIMATE >60 mL/min/[1.73:m2] 76 65 61   HEP B SURFACE YOAV - - - -   HEPATITIS C ANTIBODY NR - - - "       Rheumatoid Factor   Date Value Ref Range Status   06/07/2019 <20 <20 IU/mL Final     Cyclic Citrullinated Peptide Antibody, IgG   Date Value Ref Range Status   06/07/2019 1 <7 U/mL Final     Comment:     Negative     Cyclic Cit Pept IgG/IgA   Date Value Ref Range Status   10/30/2014 <20  Interpretation:  Negative   <20 UNITS Final     Scleroderma Antibody Scl-70 MARIO IgG   Date Value Ref Range Status   06/07/2019 0.5 0.0 - 0.9 AI Final     Comment:     Negative  Antibody index (AI) values reflect qualitative changes in antibody   concentration that cannot be directly associated with clinical condition or   disease state.       SSA (Ro) (MARIO) Antibody, IgG   Date Value Ref Range Status   06/07/2019 <0.2 0.0 - 0.9 AI Final     Comment:     Negative  Antibody index (AI) values reflect qualitative changes in antibody   concentration that cannot be directly associated with clinical condition or   disease state.       SSB (La) (MARIO) Antibody, IgG   Date Value Ref Range Status   06/07/2019 <0.2 0.0 - 0.9 AI Final     Comment:     Negative  Antibody index (AI) values reflect qualitative changes in antibody   concentration that cannot be directly associated with clinical condition or   disease state.       NAEL interpretation   Date Value Ref Range Status   06/07/2019 Negative NEG^Negative Final     Comment:                                        Reference range:  <1:40  NEGATIVE  1:40 - 1:80  BORDERLINE POSITIVE  >1:80 POSITIVE       NAEL Screen by EIA   Date Value Ref Range Status   10/07/2011 <1.0  Interpretation:  Negative <1.0 Final     DNA-ds   Date Value Ref Range Status   06/07/2019 1 <10 IU/mL Final     Comment:     Negative     Beta 2 Glycoprotein 1 Antibody IgG   Date Value Ref Range Status   06/07/2019 0.7 <7 U/mL Final     Comment:     Negative     Cardiolipin Antibody IgG   Date Value Ref Range Status   06/07/2019 <1.6 0.0 - 19.9 GPL-U/mL Final     Comment:     Negative     Cardiolipin Antibody IgM   Date  "Value Ref Range Status   06/07/2019 0.5 0.0 - 19.9 MPL-U/mL Final     Comment:     Negative     Hep B Surface Agn   Date Value Ref Range Status   12/03/2010 Negative NEG Final     [place] MARIO Antibody IgG   Date Value Ref Range Status   06/07/2019 <0.2 0.0 - 0.9 AI Final     Comment:     Negative  Antibody index (AI) values reflect qualitative changes in antibody   concentration that cannot be directly associated with clinical condition or   disease state.       Scleroderma Antibody Scl-70 MARIO IgG   Date Value Ref Range Status   06/07/2019 0.5 0.0 - 0.9 AI Final     Comment:     Negative  Antibody index (AI) values reflect qualitative changes in antibody   concentration that cannot be directly associated with clinical condition or   disease state.       Amita Finley is a 54 year old female who is being evaluated via a billable video visit.      The patient has been notified of following:     \"This video visit will be conducted via a call between you and your physician/provider. We have found that certain health care needs can be provided without the need for an in-person physical exam.  This service lets us provide the care you need with a video conversation.  If a prescription is necessary we can send it directly to your pharmacy.  If lab work is needed we can place an order for that and you can then stop by our lab to have the test done at a later time.    Video visits are billed at different rates depending on your insurance coverage.  Please reach out to your insurance provider with any questions.    If during the course of the call the physician/provider feels a video visit is not appropriate, you will not be charged for this service.\"    Patient has given verbal consent for Video visit? Yes  How would you like to obtain your AVS? MyChart  If you are dropped from the video visit, the video invite should be resent to: Text to cell phone: home  Will anyone else be joining your video visit? " No        Video-Visit Details    Type of service:  Video Visit    Video Start Time: 7:03 AM  Video End Time: 7:31 AM    Originating Location (pt. Location): Home    Distant Location (provider location):  [facility] RHEUMATOLOGY     Platform used for Video Visit: April Duran MD

## 2020-09-04 ENCOUNTER — VIRTUAL VISIT (OUTPATIENT)
Dept: RHEUMATOLOGY | Facility: CLINIC | Age: 55
End: 2020-09-04
Attending: INTERNAL MEDICINE
Payer: COMMERCIAL

## 2020-09-04 VITALS — BODY MASS INDEX: 35.31 KG/M2 | HEIGHT: 67 IN | WEIGHT: 225 LBS

## 2020-09-04 DIAGNOSIS — M19.90 INFLAMMATORY ARTHRITIS: ICD-10-CM

## 2020-09-04 RX ORDER — FOLIC ACID 1 MG/1
TABLET ORAL
Qty: 90 TABLET | Refills: 3 | Status: SHIPPED | OUTPATIENT
Start: 2020-09-04 | End: 2021-03-12

## 2020-09-04 RX ORDER — HYDROXYCHLOROQUINE SULFATE 200 MG/1
200 TABLET, FILM COATED ORAL 2 TIMES DAILY
Qty: 180 TABLET | Refills: 1 | Status: SHIPPED | OUTPATIENT
Start: 2020-09-04 | End: 2021-03-12

## 2020-09-04 ASSESSMENT — MIFFLIN-ST. JEOR: SCORE: 1645.28

## 2020-09-04 ASSESSMENT — PAIN SCALES - GENERAL: PAINLEVEL: MODERATE PAIN (5)

## 2020-09-04 NOTE — PATIENT INSTRUCTIONS
Diagnosis:  1.  Seronegative rheumatoid arthritis: Inflammatory symptoms like prolonged morning stiffness, swelling and pain in the elbows and small joints of the hands, ankles and feet suggest low-grade disease activity.  Plaquenil is likely doing some good, but as we discussed in October, I think that more immunomodulatory therapy will benefit you.    Plan:  1.  Restart methotrexate.  Full dose will be 8 tablets weekly.  However I recommend starting with 2 tablets on week 1, 4 tablets on week 2, and then moving up to the full 8 tablets on week 3.  While on methotrexate:   -- Check labs every 3 months (AST/ALT, Albumin, CBC with platelets)   -- Limit alcohol intake to 2 drinks weekly; use folate 1-3 mg daily to address queasiness.  --Tylenol 500-1000 mg can be used as needed up to three times daily for nausea/headache associated with dosing.    2.  Continue hydroxychloroquine 400 mg daily.  Obtain ophthalmology evaluation annually to rule out rare Plaquenil eye toxicity.  3.  Return to clinic in 4 months; obtain blood work to monitor for methotrexate toxicity in 3 months.  4.  Check with primary care regarding the recent lab finding of suppressed thyroid-stimulating hormone level and possible adjustment to thyroid hormone replacement.

## 2020-09-04 NOTE — LETTER
2020       RE: Amita Finley  [address] [id number]     Dear Colleague,    Thank you for referring your patient, Amita Finley, to the [facility] RHEUMATOLOGY at [facility]. Please see a copy of my visit note below.    [facility]  Rheumatology Clinic  Lucio Duran MD  2020     Name: Amiat Finley  MRN: [id number]  Age: 53 year old  : 1965  Referring provider: Mark Torres     Assessment and Plan:   # Seronegative inflammatory arthritis:   Patient relates persistent symmetrical small joint hand and foot pain, as well as prolonged morning stiffness..  Video exam shows no gross synovial thickening at multiple MCPs, thumb MPs, left ankle, and multiple MTPs.    Blood work on 2019 showed creatinine, CRP, CCP, RF, uric acid, CBC, lupus panel, NAEL, anti-cardiolipin antibodies, anti-B2GP1, MARIO panel all negative or normal. Laboratory evaluation on 2020 showed normal comprehensive metabolic panel; TSH was suppressed at 0.09; CBC was normal.  X-rays of L hands in 2019 showed no evidence of erosions or other bony abnormalities.    Your negative rheumatoid arthritis is active.  The absence of RF and CCP, and past normal hand xrays and inflammatory marker results reflect good-prognosis disease. I think that she has experienced modest improvement with hydroxychloroquine monotherapy, but symptoms and signs of inflammatory joint disease persist.  I recommend resuming methotrexate for better control of inflammatory joint symptoms.  I recommend resuming 20 mg weekly. while on the drug, she should undergo every 3 month CBC, LFTs, and creatinine. She should restrict alcohol intake to 2 drinks per week and she may use a B-complex multivitamin daily to prevent mucosal irritation. I recommend continuing plaquenil 400 mg daily and obtaining screening toxicity exam annually. Intermittent prednisone may be used  "3-4 times annually for flares of joint pain.    Follow-up: Return in about 4 months (around 1/4/2021).     HPI:   Amita Finley follows up for inflammatory arthritis.  She was last seen in October 2019, when arthritis was thought to be active.  I recommended continuing hydroxychloroquine 400 mg daily, and starting methotrexate 20 mg once weekly.    Interval history 09-:    After the last visit, she started methotrexate as instructed.  She had a bad reaction to methotrexate with her first dose with \"feelling weird\" for several days.  She continue the methotrexate however, and still had some mild queasiness and not feeling right for several months.  She persisted using methotrexate however, and she noted improved mobilty with walking longer distance better, and she also had decreased morning stiffness as well as improved ability to walk up and down stairs.  When the COVID-19 epidemic struck, her  was hospitalized with COVID-19, and she stopped taking the methotrexate in early February.  She has not restarted the drug    Today, her elbows are sore. When she uses her arms or leans on her elbows, pain is worse. Pain is worse in the morning. Early morning stiffness is 90 minutes.  Joint pain affects ankles, feet, and wrists as well; there is variable subtle swelling.  She uses ibuprofen and tylenol once a month.     She has continued plaquenil 400 mg daily. She is working on getting eye examination for plaquenil toxicity.  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 Torres, who she last saw on 06/07/2019. He noted history of rheumatoid arthritis diagnosed 10+ years ago, which had worsened in the 3 months prior. She had joint pain in the index fingers, middle fingers, small fingers, elbows, great toe on the left, forefoot on the right, and neck. Prednisone and plaquenil were started.  "     Today she reports that she has been using plaquenil for about 3 months but continues to have hand and foot pain. She is right handed but the left hand always had more pain and swelling compared to the right. The 2nd and 3rd knuckles are the most painful, although for a period of time the 4th and 5th knuckles were most painful. Hand cramping/claw hand formation, which was present for years, has decreased since starting prednisone. She notes that she was hopping on her left ankle while moving some furniture about a month and a half and since the ankle has been hot and painful, worse with weight bearing. Her joint pain is worse in the morning and accompanied bu about 2-2.5 hours of early morning stiffness.     She uses ibuprofen occasionally but tries to avoid this due to problems with leg edema. She used prednisone when she first started plaquenil and had about 2 weeks of significant improvement in joint pain. She has used medrol Dosepak on and off but had never been on other immunomodulatory medications. She has used Lovenox during pregnancy. She uses hydrochlorothiazide and spironolactone for leg swelling. She also takes thyroid replacement, which was adjusted after labs in 06/2019. She uses trazodone at night for sleep. She has had a right knee injection in the past for knee pain, which was successful.     She denies sores in the mouth or nose. She has had small itchy nodules on the fingers but denies rashes otherwise. She also denies cough, shortness of breath, abdominal pain, nausea, vomiting, diarrhea, constipation, or numbness, weakness, tingling in the arms or legs.     Review of Systems:   Pertinent items are noted in HPI or as below, remainder of complete ROS is negative.      No recent problems with hearing or vision. No swallowing problems.   No breathing difficulty, shortness of breath, coughing, or wheezing  No chest pain or palpitations  No heart burn, indigestion, abdominal pain, nausea, vomiting,  diarrhea  No urination problems, no bloody, cloudy urine, no dysuria  No numbing, tingling, weakness  No headaches or confusion  No rashes. No easy bleeding or bruising.     Active Medications:     Current Outpatient Medications:      folic acid (FOLVITE) 1 MG tablet, Take 1-3 tabs by mouth daily., Disp: 90 tablet, Rfl: 3     hydrochlorothiazide (HYDRODIURIL) 25 MG tablet, Take 1 tablet (25 mg) by mouth daily, Disp: 90 tablet, Rfl: 1     hydroxychloroquine (PLAQUENIL) 200 MG tablet, Take 1 tablet (200 mg) by mouth 2 times daily Get annual eye exams for hydroxychloroquine (Plaquenil) monitoring and fax to [phone], Disp: 180 tablet, Rfl: 1     levothyroxine (SYNTHROID/LEVOTHROID) 150 MCG tablet, Take 1 tablet (150 mcg) by mouth daily Take 300 mg by mouth on Monday and Friday. Take 150 mg by mouth all other days., Disp: 115 tablet, Rfl: 1     LORazepam (ATIVAN) 0.5 MG tablet, TAKE ONE-HALF TO ONE TABLET BY MOUTH EVERY 8 HOURS AS NEEDED FOR ANXIETY, Disp: 20 tablet, Rfl: 0     methotrexate 2.5 MG tablet, Take 8 tablets (20 mg) by mouth every 7 days Labs every 8 - 12 weeks for refills., Disp: 32 tablet, Rfl: 4     predniSONE (DELTASONE) 5 MG tablet, 4 tabs daily for 1 week, then 3 tabs daily for 1 week, Disp: 50 tablet, Rfl: 2     spironolactone (ALDACTONE) 50 MG tablet, TAKE TWO TABLETS BY MOUTH ONCE DAILY, Disp: 180 tablet, Rfl: 1     traZODone (DESYREL) 100 MG tablet, Take 1 tablet (100 mg) by mouth At Bedtime, Disp: 90 tablet, Rfl: 1      Allergies:   Ciprofloxacin and Augmentin      Past Medical History:  Antiphospholipid antibodies positive   Inflammatory arthritis    Baker's cyst of knee   Hypothyroidism   Obesity   Hypothyroidism   Lump/mass in breast   Rosacea   Back pain   Leg edema   Anxiety   Depression   Dry eye     Past Surgical History:  Novasure endometrial ablation 2007     Family History:   Mother: GI disease, hypertension  Father: coronary artery disease, diabetes mellitus, MI, glaucoma,  "hypertension   Maternal grandmother: cancer   Sister: GI disease   Daughter: thyroid disease     Social History:   Former smoker, 30 pack years, quit in 1996.   3 drinks per week.   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 She works 60 hours per week. 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   She has two children.     Physical Exam:   Ht 1.689 m (5' 6.5\")   Wt 102.1 kg (225 lb)   BMI 35.77 kg/m     Wt Readings from Last 4 Encounters:   09/04/20 102.1 kg (225 lb)   12/06/19 112 kg (247 lb)   10/04/19 116.3 kg (256 lb 8 oz)   07/18/19 111.1 kg (245 lb)     Constitutional: Well-developed, appearing stated age; cooperative  Eyes: Normal EOM, PERRLA, vision, conjunctiva, sclera  ENT: Normal external ears, nose, hearing, lips, teeth, gums, throat. No mucous membrane lesions, normal saliva pool  Neck: No mass or thyroid enlargement  MS: Some subtle fusiform swelling in multiple digits.  Visualized MCP, PIP, wrist, and elbow range of motion is normal.  The TMJ, neck, shoulder, elbow, spine, hip, knee joints were examined and found normal. No active synovitis or altered joint anatomy. Full joint ROM. Normal  strength. No dactylitis,  tenosynovitis, enthesopathy.  Skin: No nail pitting, alopecia, rash, nodules or lesions  Neuro: Normal cranial nerves  Psych: Normal judgement, orientation, memory, affect.     Laboratory:   RHEUM RESULTS Latest Ref Rng & Units 6/7/2019 10/15/2019 8/11/2020   DNA-DS <10 IU/mL 1 - -   SED RATE 0 - 30 mm/h 9 - -   CRP, INFLAMMATION 0.0 - 8.0 mg/L 6.0 4.8 -   RHEUMATOID FACTOR <20 IU/mL <20 - -   NAEL SCREEN BY EIA <1.0 - - -   AST 0 - 45 U/L - 20 17   ALT 0 - 50 U/L - 40 32   ALBUMIN 3.4 - 5.0 g/dL - - 3.7   WBC 4.0 - 11.0 10e9/L 7.6 8.7 6.4   RBC 3.8 - 5.2 10e12/L 5.09 5.27(H) 5.23(H)   HGB 11.7 - 15.7 g/dL 14.3 15.0 14.9   HCT 35.0 - 47.0 % 42.7 45.1 45.5   MCV 78 - 100 fl 84 86 87   MCHC 31.5 - 36.5 g/dL 33.5 33.3 " 32.7   RDW 10.0 - 15.0 % 13.4 13.3 12.7    - 450 10e9/L 248 275 228   CREATININE 0.52 - 1.04 mg/dL 0.87 0.98 1.04   GFR ESTIMATE, IF BLACK >60 mL/min/[1.73:m2] 88 76 70   GFR ESTIMATE >60 mL/min/[1.73:m2] 76 65 61   HEP B SURFACE YOAV - - - -   HEPATITIS C ANTIBODY NR - - -       Rheumatoid Factor   Date Value Ref Range Status   06/07/2019 <20 <20 IU/mL Final     Cyclic Citrullinated Peptide Antibody, IgG   Date Value Ref Range Status   06/07/2019 1 <7 U/mL Final     Comment:     Negative     Cyclic Cit Pept IgG/IgA   Date Value Ref Range Status   10/30/2014 <20  Interpretation:  Negative   <20 UNITS Final     Scleroderma Antibody Scl-70 MARIO IgG   Date Value Ref Range Status   06/07/2019 0.5 0.0 - 0.9 AI Final     Comment:     Negative  Antibody index (AI) values reflect qualitative changes in antibody   concentration that cannot be directly associated with clinical condition or   disease state.       SSA (Ro) (MARIO) Antibody, IgG   Date Value Ref Range Status   06/07/2019 <0.2 0.0 - 0.9 AI Final     Comment:     Negative  Antibody index (AI) values reflect qualitative changes in antibody   concentration that cannot be directly associated with clinical condition or   disease state.       SSB (La) (MARIO) Antibody, IgG   Date Value Ref Range Status   06/07/2019 <0.2 0.0 - 0.9 AI Final     Comment:     Negative  Antibody index (AI) values reflect qualitative changes in antibody   concentration that cannot be directly associated with clinical condition or   disease state.       NAEL interpretation   Date Value Ref Range Status   06/07/2019 Negative NEG^Negative Final     Comment:                                        Reference range:  <1:40  NEGATIVE  1:40 - 1:80  BORDERLINE POSITIVE  >1:80 POSITIVE       NAEL Screen by EIA   Date Value Ref Range Status   10/07/2011 <1.0  Interpretation:  Negative <1.0 Final     DNA-ds   Date Value Ref Range Status   06/07/2019 1 <10 IU/mL Final     Comment:     Negative     Beta 2  "Glycoprotein 1 Antibody IgG   Date Value Ref Range Status   06/07/2019 0.7 <7 U/mL Final     Comment:     Negative     Cardiolipin Antibody IgG   Date Value Ref Range Status   06/07/2019 <1.6 0.0 - 19.9 GPL-U/mL Final     Comment:     Negative     Cardiolipin Antibody IgM   Date Value Ref Range Status   06/07/2019 0.5 0.0 - 19.9 MPL-U/mL Final     Comment:     Negative     Hep B Surface Agn   Date Value Ref Range Status   12/03/2010 Negative NEG Final     [place] MARIO Antibody IgG   Date Value Ref Range Status   06/07/2019 <0.2 0.0 - 0.9 AI Final     Comment:     Negative  Antibody index (AI) values reflect qualitative changes in antibody   concentration that cannot be directly associated with clinical condition or   disease state.       Scleroderma Antibody Scl-70 MARIO IgG   Date Value Ref Range Status   06/07/2019 0.5 0.0 - 0.9 AI Final     Comment:     Negative  Antibody index (AI) values reflect qualitative changes in antibody   concentration that cannot be directly associated with clinical condition or   disease state.       Amita Finley is a 54 year old female who is being evaluated via a billable video visit.      The patient has been notified of following:     \"This video visit will be conducted via a call between you and your physician/provider. We have found that certain health care needs can be provided without the need for an in-person physical exam.  This service lets us provide the care you need with a video conversation.  If a prescription is necessary we can send it directly to your pharmacy.  If lab work is needed we can place an order for that and you can then stop by our lab to have the test done at a later time.    Video visits are billed at different rates depending on your insurance coverage.  Please reach out to your insurance provider with any questions.    If during the course of the call the physician/provider feels a video visit is not appropriate, you will not be charged for this " "service.\"    Patient has given verbal consent for Video visit? Yes  How would you like to obtain your AVS? MyChart  If you are dropped from the video visit, the video invite should be resent to: Text to cell phone: home  Will anyone else be joining your video visit? No        Video-Visit Details    Type of service:  Video Visit    Video Start Time: 7:03 AM  Video End Time: 7:31 AM    Originating Location (pt. Location): Home    Distant Location (provider location):  [facility] RHEUMATOLOGY     Platform used for Video Visit: April Duran MD    "

## 2020-11-20 ENCOUNTER — MYC REFILL (OUTPATIENT)
Dept: INTERNAL MEDICINE | Facility: CLINIC | Age: 55
End: 2020-11-20

## 2020-11-20 DIAGNOSIS — F41.9 ANXIETY: ICD-10-CM

## 2020-11-20 DIAGNOSIS — F51.01 PRIMARY INSOMNIA: ICD-10-CM

## 2020-11-20 DIAGNOSIS — E03.9 HYPOTHYROIDISM, UNSPECIFIED TYPE: ICD-10-CM

## 2020-11-20 DIAGNOSIS — R60.0 EDEMA OF BOTH LEGS: ICD-10-CM

## 2020-11-22 ENCOUNTER — HEALTH MAINTENANCE LETTER (OUTPATIENT)
Age: 55
End: 2020-11-22

## 2020-11-22 RX ORDER — HYDROCHLOROTHIAZIDE 25 MG/1
25 TABLET ORAL DAILY
Qty: 90 TABLET | Refills: 0 | Status: SHIPPED | OUTPATIENT
Start: 2020-11-22 | End: 2021-01-19

## 2020-11-22 RX ORDER — SPIRONOLACTONE 50 MG/1
TABLET, FILM COATED ORAL
Qty: 180 TABLET | Refills: 0 | Status: SHIPPED | OUTPATIENT
Start: 2020-11-22 | End: 2021-01-19

## 2020-11-22 RX ORDER — LEVOTHYROXINE SODIUM 150 UG/1
150 TABLET ORAL DAILY
Qty: 115 TABLET | Refills: 0 | Status: SHIPPED | OUTPATIENT
Start: 2020-11-22 | End: 2021-01-19

## 2020-11-22 RX ORDER — TRAZODONE HYDROCHLORIDE 100 MG/1
100 TABLET ORAL AT BEDTIME
Qty: 90 TABLET | Refills: 0 | Status: SHIPPED | OUTPATIENT
Start: 2020-11-22 | End: 2021-01-19

## 2020-11-22 RX ORDER — LORAZEPAM 0.5 MG/1
TABLET ORAL
Qty: 20 TABLET | Refills: 0 | Status: SHIPPED | OUTPATIENT
Start: 2020-11-22 | End: 2021-03-12

## 2020-11-24 ENCOUNTER — MYC REFILL (OUTPATIENT)
Dept: RHEUMATOLOGY | Facility: CLINIC | Age: 55
End: 2020-11-24

## 2020-11-24 DIAGNOSIS — M06.9 RHEUMATOID ARTHRITIS INVOLVING MULTIPLE SITES, UNSPECIFIED WHETHER RHEUMATOID FACTOR PRESENT (H): ICD-10-CM

## 2020-11-24 RX ORDER — PREDNISONE 5 MG/1
TABLET ORAL
Qty: 50 TABLET | Refills: 0 | Status: CANCELLED | OUTPATIENT
Start: 2020-11-24

## 2021-01-19 ENCOUNTER — MYC REFILL (OUTPATIENT)
Dept: INTERNAL MEDICINE | Facility: CLINIC | Age: 56
End: 2021-01-19

## 2021-01-19 DIAGNOSIS — F51.01 PRIMARY INSOMNIA: ICD-10-CM

## 2021-01-19 DIAGNOSIS — E03.9 HYPOTHYROIDISM, UNSPECIFIED TYPE: ICD-10-CM

## 2021-01-19 DIAGNOSIS — R60.0 EDEMA OF BOTH LEGS: ICD-10-CM

## 2021-01-20 RX ORDER — TRAZODONE HYDROCHLORIDE 100 MG/1
100 TABLET ORAL AT BEDTIME
Qty: 90 TABLET | Refills: 0 | Status: SHIPPED | OUTPATIENT
Start: 2021-01-20 | End: 2021-03-12

## 2021-01-20 RX ORDER — SPIRONOLACTONE 50 MG/1
TABLET, FILM COATED ORAL
Qty: 180 TABLET | Refills: 0 | Status: SHIPPED | OUTPATIENT
Start: 2021-01-20 | End: 2021-03-12

## 2021-01-20 RX ORDER — LEVOTHYROXINE SODIUM 150 UG/1
150 TABLET ORAL DAILY
Qty: 115 TABLET | Refills: 0 | Status: SHIPPED | OUTPATIENT
Start: 2021-01-20 | End: 2021-03-13

## 2021-01-20 RX ORDER — HYDROCHLOROTHIAZIDE 25 MG/1
25 TABLET ORAL DAILY
Qty: 90 TABLET | Refills: 0 | Status: SHIPPED | OUTPATIENT
Start: 2021-01-20 | End: 2021-03-12

## 2021-02-13 ENCOUNTER — HEALTH MAINTENANCE LETTER (OUTPATIENT)
Age: 56
End: 2021-02-13

## 2021-03-12 ENCOUNTER — OFFICE VISIT (OUTPATIENT)
Dept: INTERNAL MEDICINE | Facility: CLINIC | Age: 56
End: 2021-03-12
Payer: COMMERCIAL

## 2021-03-12 VITALS
TEMPERATURE: 97.7 F | HEIGHT: 66 IN | RESPIRATION RATE: 16 BRPM | OXYGEN SATURATION: 98 % | HEART RATE: 89 BPM | SYSTOLIC BLOOD PRESSURE: 124 MMHG | DIASTOLIC BLOOD PRESSURE: 78 MMHG | BODY MASS INDEX: 42.06 KG/M2 | WEIGHT: 261.7 LBS

## 2021-03-12 DIAGNOSIS — E78.5 HYPERLIPIDEMIA LDL GOAL <160: ICD-10-CM

## 2021-03-12 DIAGNOSIS — F51.01 PRIMARY INSOMNIA: ICD-10-CM

## 2021-03-12 DIAGNOSIS — M19.90 INFLAMMATORY ARTHRITIS: ICD-10-CM

## 2021-03-12 DIAGNOSIS — Z00.00 ROUTINE GENERAL MEDICAL EXAMINATION AT A HEALTH CARE FACILITY: Primary | ICD-10-CM

## 2021-03-12 DIAGNOSIS — R76.0 ANTIPHOSPHOLIPID ANTIBODY POSITIVE: ICD-10-CM

## 2021-03-12 DIAGNOSIS — R10.11 RUQ ABDOMINAL PAIN: ICD-10-CM

## 2021-03-12 DIAGNOSIS — F41.9 ANXIETY: ICD-10-CM

## 2021-03-12 DIAGNOSIS — E03.9 HYPOTHYROIDISM, UNSPECIFIED TYPE: ICD-10-CM

## 2021-03-12 DIAGNOSIS — R60.0 EDEMA OF BOTH LEGS: ICD-10-CM

## 2021-03-12 DIAGNOSIS — D12.6 ADENOMATOUS POLYP OF COLON, UNSPECIFIED PART OF COLON: ICD-10-CM

## 2021-03-12 DIAGNOSIS — Z12.31 ENCOUNTER FOR SCREENING MAMMOGRAM FOR BREAST CANCER: ICD-10-CM

## 2021-03-12 LAB
ERYTHROCYTE [DISTWIDTH] IN BLOOD BY AUTOMATED COUNT: 13.1 % (ref 10–15)
HCT VFR BLD AUTO: 43.8 % (ref 35–47)
HGB BLD-MCNC: 14.4 G/DL (ref 11.7–15.7)
MCH RBC QN AUTO: 29.4 PG (ref 26.5–33)
MCHC RBC AUTO-ENTMCNC: 32.9 G/DL (ref 31.5–36.5)
MCV RBC AUTO: 89 FL (ref 78–100)
PLATELET # BLD AUTO: 213 10E9/L (ref 150–450)
RBC # BLD AUTO: 4.9 10E12/L (ref 3.8–5.2)
WBC # BLD AUTO: 6.7 10E9/L (ref 4–11)

## 2021-03-12 PROCEDURE — 80061 LIPID PANEL: CPT | Performed by: INTERNAL MEDICINE

## 2021-03-12 PROCEDURE — 84443 ASSAY THYROID STIM HORMONE: CPT | Performed by: INTERNAL MEDICINE

## 2021-03-12 PROCEDURE — 90750 HZV VACC RECOMBINANT IM: CPT | Performed by: INTERNAL MEDICINE

## 2021-03-12 PROCEDURE — 96127 BRIEF EMOTIONAL/BEHAV ASSMT: CPT | Performed by: INTERNAL MEDICINE

## 2021-03-12 PROCEDURE — 85027 COMPLETE CBC AUTOMATED: CPT | Performed by: INTERNAL MEDICINE

## 2021-03-12 PROCEDURE — 90471 IMMUNIZATION ADMIN: CPT | Performed by: INTERNAL MEDICINE

## 2021-03-12 PROCEDURE — 99213 OFFICE O/P EST LOW 20 MIN: CPT | Mod: 25 | Performed by: INTERNAL MEDICINE

## 2021-03-12 PROCEDURE — 36415 COLL VENOUS BLD VENIPUNCTURE: CPT | Performed by: INTERNAL MEDICINE

## 2021-03-12 PROCEDURE — 99396 PREV VISIT EST AGE 40-64: CPT | Mod: 25 | Performed by: INTERNAL MEDICINE

## 2021-03-12 PROCEDURE — 80053 COMPREHEN METABOLIC PANEL: CPT | Performed by: INTERNAL MEDICINE

## 2021-03-12 RX ORDER — LORAZEPAM 0.5 MG/1
TABLET ORAL
Qty: 20 TABLET | Refills: 0 | Status: SHIPPED | OUTPATIENT
Start: 2021-03-12 | End: 2021-08-12

## 2021-03-12 RX ORDER — HYDROCHLOROTHIAZIDE 25 MG/1
25 TABLET ORAL DAILY
Qty: 90 TABLET | Refills: 0 | Status: SHIPPED | OUTPATIENT
Start: 2021-03-12 | End: 2021-12-21

## 2021-03-12 RX ORDER — TRAZODONE HYDROCHLORIDE 100 MG/1
100 TABLET ORAL AT BEDTIME
Qty: 90 TABLET | Refills: 4 | Status: SHIPPED | OUTPATIENT
Start: 2021-03-12 | End: 2021-08-12

## 2021-03-12 RX ORDER — SPIRONOLACTONE 50 MG/1
TABLET, FILM COATED ORAL
Qty: 180 TABLET | Refills: 0 | Status: SHIPPED | OUTPATIENT
Start: 2021-03-12 | End: 2021-05-03

## 2021-03-12 ASSESSMENT — ENCOUNTER SYMPTOMS
NAUSEA: 0
EYE PAIN: 0
CONSTIPATION: 0
SHORTNESS OF BREATH: 0
DIZZINESS: 0
MYALGIAS: 0
FREQUENCY: 0
DYSURIA: 0
CHILLS: 0
ABDOMINAL PAIN: 1
HEARTBURN: 0
FEVER: 0
ARTHRALGIAS: 1
HEMATOCHEZIA: 0
DIARRHEA: 0
BREAST MASS: 0
SORE THROAT: 1
HEMATURIA: 0
NERVOUS/ANXIOUS: 1
JOINT SWELLING: 1
PALPITATIONS: 0
COUGH: 0
HEADACHES: 0
PARESTHESIAS: 0
WEAKNESS: 0

## 2021-03-12 ASSESSMENT — PATIENT HEALTH QUESTIONNAIRE - PHQ9
SUM OF ALL RESPONSES TO PHQ QUESTIONS 1-9: 12
10. IF YOU CHECKED OFF ANY PROBLEMS, HOW DIFFICULT HAVE THESE PROBLEMS MADE IT FOR YOU TO DO YOUR WORK, TAKE CARE OF THINGS AT HOME, OR GET ALONG WITH OTHER PEOPLE: SOMEWHAT DIFFICULT
SUM OF ALL RESPONSES TO PHQ QUESTIONS 1-9: 12

## 2021-03-12 ASSESSMENT — MIFFLIN-ST. JEOR: SCORE: 1794.84

## 2021-03-12 NOTE — PROGRESS NOTES
Dr Capellan's note    Patient's instructions / PLAN:                                                        Plan:  1. We will decide about levothyroxine dose after we see the TSH results  2.  Labs today - [address]   3. Continue the other meds, same doses for now.  4. Abdomen ultrasound -- To schedule this test you may call Scheduling center at [phone]    5. Shingrix vaccine today       ASSESSMENT & PLAN:                                                      (Z00.00) Routine general medical examination at a health care facility  (primary encounter diagnosis)  Comment:   Plan: Comprehensive metabolic panel, CBC with         platelets, TSH with free T4 reflex, Lipid panel        reflex to direct LDL Fasting            (R76.0) Antiphospholipid antibody positive  Comment:   Plan: f/No components found for: URINE w rheumato    (E78.5) Hyperlipidemia LDL goal <160  Comment:   Plan: Lipid panel reflex to direct LDL Fasting            (E03.9) Hypothyroidism, unspecified type  Comment: Not controlled   Plan: TSH with free T4 reflex            (M19.90) Inflammatory arthritis  Comment:   Plan: Comprehensive metabolic panel, CBC with         platelets        F/u w rheumato     (R60.0) Edema of both legs  Comment:   Plan: hydrochlorothiazide (HYDRODIURIL) 25 MG tablet,        spironolactone (ALDACTONE) 50 MG tablet            (F51.01) Primary insomnia  Comment:   Plan: traZODone (DESYREL) 100 MG tablet            (F41.9) Anxiety  Comment:   Plan: LORazepam (ATIVAN) 0.5 MG tablet            (Z12.31) Encounter for screening mammogram for breast cancer  Comment:   Plan: MA Screening Digital Bilateral            (D12.6) Adenomatous polyp of colon - need colonoscopy 2024  Comment: Plan:    .    (R10.11) RUQ abdominal pain  Comment:   Plan: US Abdomen Limited                 Chief Complaint:                                                        Annual exam  Follow up chronic medical problems      SUBJECTIVE:                                                     History of present illness     We reviewed the chronic medical problems as above.   I reviewed the recent tests results in Epic     Sometimes RUQ pain     ROS:     See below      PMHx: - reviewed  Past Medical History:   Diagnosis Date     Antiphospholipid antibodies positive 2011     Arthritis      Baker's cyst of knee      Hypothyroidism      Obesity        PSHx: reviewed  Past Surgical History:   Procedure Laterality Date     Novasure endometrial ablation  10/2007        Soc Hx: No daily alcohol, no smoking  Social History     Socioeconomic History     Marital status:      Spouse name: Not on file     Number of children: 2     Years of education: Not on file     Highest education level: Not on file   Occupational History     Occupation: nurse     Employer: [employer]     Comment: [employer]   Social Needs     Financial resource strain: Not on file     Food insecurity     Worry: Not on file     Inability: Not on file     Transportation needs     Medical: Not on file     Non-medical: Not on file   Tobacco Use     Smoking status: Former Smoker     Packs/day: 1.50     Years: 20.00     Pack years: 30.00     Types: Cigarettes     Quit date: 1996     Years since quittin.2     Smokeless tobacco: Never Used   Substance and Sexual Activity     Alcohol use: Yes     Alcohol/week: 3.0 standard drinks     Types: 3 Standard drinks or equivalent per week     Comment: wine on the weekend     Drug use: No     Sexual activity: Yes     Partners: Male     Birth control/protection: Surgical   Lifestyle     Physical activity     Days per week: Not on file     Minutes per session: Not on file     Stress: Not on file   Relationships     Social connections     Talks on phone: Not on file     Gets together: Not on file     Attends Protestant service: Not on file     Active member of club or organization: Not on file     Attends meetings of clubs or organizations: Not on  "file     Relationship status: Not on file     Intimate partner violence     Fear of current or ex partner: Not on file     Emotionally abused: Not on file     Physically abused: Not on file     Forced sexual activity: Not on file   Other Topics Concern     Parent/sibling w/ CABG, MI or angioplasty before 65F 55M? Not Asked   Social History Narrative     Not on file        Fam Hx: reviewed  Family History   Problem Relation Age of Onset     Gastrointestinal Disease Mother         in 60's     Hypertension Mother      C.A.D. Father      Diabetes Father      Cardiovascular Father         First MI age 66     Glaucoma Father      Hypertension Father      No Known Problems Sister      No Known Problems Brother      Cancer Maternal Grandmother      Thyroid Disease Daughter      Hypothyroidism Daughter      Macular Degeneration No family hx of          Screening: reviewed      All: reviewed    Meds: reviewed  Current Outpatient Medications   Medication Sig Dispense Refill     hydrochlorothiazide (HYDRODIURIL) 25 MG tablet Take 1 tablet (25 mg) by mouth daily 90 tablet 0     levothyroxine (SYNTHROID/LEVOTHROID) 150 MCG tablet Take 1 tablet (150 mcg) by mouth daily Take 300 mg by mouth on Monday and Friday. Take 150 mg by mouth all other days. 115 tablet 0     LORazepam (ATIVAN) 0.5 MG tablet TAKE ONE-HALF TO ONE TABLET BY MOUTH EVERY 8 HOURS AS NEEDED FOR ANXIETY 20 tablet 0     predniSONE (DELTASONE) 5 MG tablet 4 tabs daily for 1 week, then 3 tabs daily for 1 week 50 tablet 0     spironolactone (ALDACTONE) 50 MG tablet TAKE TWO TABLETS BY MOUTH ONCE DAILY 180 tablet 0     traZODone (DESYREL) 100 MG tablet Take 1 tablet (100 mg) by mouth At Bedtime 90 tablet 0       OBJECTIVE:                                                    Physical Exam :  Blood pressure 124/78, pulse 89, temperature 97.7  F (36.5  C), temperature source Oral, resp. rate 16, height 1.67 m (5' 5.75\"), weight 118.7 kg (261 lb 11.2 oz), SpO2 98 %, not " currently breastfeeding.     NAD, appears comfortable  Skin clear, no rashes  Neck: supple, no JVD,  no thyroidmegaly  Lymph nodes non palpable in the cervical, supraclavicular axillaries,   Chest: clear to auscultation with good respiratory effort  Cardiac: S1S2, RRR, no mgr appreciated  Abdomen: soft, mild tender right upper quadrant not distended, audible bowel sound, no hepatosplenomegaly, no palpable masses, no abdominal bruits  Extremities: no cyanosis, clubbing or edema.   Neuro: A, Ox3, no focal signs.  Breast exam in supine and erect position: they are symmetrical, no skin changes, no tenderness or nodes on palpation. Nipples are erect, no skin lesions, no discharge on pressure.    Pelvic exam: deferred, no symptoms, no hx of abnormal pap         Harika Capellan MD  Internal Medicine          SUBJECTIVE:   CC: Amita Finley is an 55 year old woman who presents for preventive health visit.       Patient has been advised of split billing requirements and indicates understanding: Yes  Healthy Habits:     Getting at least 3 servings of Calcium per day:  Yes    Bi-annual eye exam:  Yes    Dental care twice a year:  Yes    Sleep apnea or symptoms of sleep apnea:  None    Diet:  Regular (no restrictions)    Frequency of exercise:  1 day/week    Duration of exercise:  15-30 minutes    Taking medications regularly:  Yes    Medication side effects:  None    PHQ-2 Total Score: 3    Additional concerns today:  No              Today's PHQ-2 Score:   PHQ-2 ( 1999 Pfizer) 3/12/2021   Q1: Little interest or pleasure in doing things 1   Q2: Feeling down, depressed or hopeless 2   PHQ-2 Score 3   Q1: Little interest or pleasure in doing things Several days   Q2: Feeling down, depressed or hopeless More than half the days   PHQ-2 Score 3       Abuse: Current or Past (Physical, Sexual or Emotional) - No  Do you feel safe in your environment? Yes    Have you ever done Advance Care Planning? (For example, a Health  Directive, POLST, or a discussion with a medical provider or your loved ones about your wishes): No, advance care planning information given to patient to review.  Patient plans to discuss their wishes with loved ones or provider.      Social History     Tobacco Use     Smoking status: Former Smoker     Packs/day: 1.50     Years: 20.00     Pack years: 30.00     Types: Cigarettes     Quit date: 1996     Years since quittin.2     Smokeless tobacco: Never Used   Substance Use Topics     Alcohol use: Yes     Alcohol/week: 3.0 standard drinks     Types: 3 Standard drinks or equivalent per week     Comment: wine on the weekend         Alcohol Use 3/12/2021   Prescreen: >3 drinks/day or >7 drinks/week? No   Prescreen: >3 drinks/day or >7 drinks/week? -       Any new diagnosis of family breast, ovarian, or bowel cancer? No     Reviewed orders with patient.  Reviewed health maintenance and updated orders accordingly - Yes  Labs reviewed in [facility]    Breast CA Risk Screening:  No flowsheet data found.        Pertinent mammograms are reviewed under the imaging tab.    History of abnormal Pap smear:   PAP / HPV Latest Ref Rng & Units 2019 2014 12/3/2010   PAP - NIL NIL NIL   HPV 16 DNA NEG:Negative Negative - -   HPV 18 DNA NEG:Negative Negative - -   OTHER HR HPV NEG:Negative Negative - -     Reviewed and updated as needed this visit by clinical staff  Tobacco  Allergies  Meds   Med Hx  Surg Hx  Fam Hx  Soc Hx        Reviewed and updated as needed this visit by Provider                    Review of Systems   Constitutional: Negative for chills and fever.   HENT: Positive for hearing loss and sore throat. Negative for congestion and ear pain.    Eyes: Negative for pain and visual disturbance.   Respiratory: Negative for cough and shortness of breath.    Cardiovascular: Positive for peripheral edema. Negative for chest pain and palpitations.   Gastrointestinal: Positive for abdominal pain. Negative for  "constipation, diarrhea, heartburn, hematochezia and nausea.   Breasts:  Negative for tenderness, breast mass and discharge.   Genitourinary: Negative for dysuria, frequency, genital sores, hematuria, pelvic pain, urgency, vaginal bleeding and vaginal discharge.   Musculoskeletal: Positive for arthralgias and joint swelling. Negative for myalgias.   Skin: Negative for rash.   Neurological: Negative for dizziness, weakness, headaches and paresthesias.   Psychiatric/Behavioral: Positive for mood changes. The patient is nervous/anxious.        Patient has been advised of split billing requirements and indicates understanding: Yes  COUNSELING:  Reviewed preventive health counseling, as reflected in patient instructions       Regular exercise       Healthy diet/nutrition    Estimated body mass index is 35.77 kg/m  as calculated from the following:    Height as of 9/4/20: 1.689 m (5' 6.5\").    Weight as of 9/4/20: 102.1 kg (225 lb).    Weight management plan: Discussed healthy diet and exercise guidelines    She reports that she quit smoking about 25 years ago. Her smoking use included cigarettes. She has a 30.00 pack-year smoking history. She has never used smokeless tobacco.      Counseling Resources:  ATP IV Guidelines  Pooled Cohorts Equation Calculator  Breast Cancer Risk Calculator  BRCA-Related Cancer Risk Assessment: FHS-7 Tool  FRAX Risk Assessment  ICSI Preventive Guidelines  Dietary Guidelines for Americans, 2010  USDA's MyPlate  ASA Prophylaxis  Lung CA Screening    Harika Michel MD  [facility]  Answers for HPI/ROS submitted by the patient on 3/12/2021   Annual Exam:  If you checked off any problems, how difficult have these problems made it for you to do your work, take care of things at home, or get along with other people?: Somewhat difficult  PHQ9 TOTAL SCORE: 12    "

## 2021-03-12 NOTE — NURSING NOTE
Prior to immunization administration, verified patients identity using patient s name and date of birth. Please see Immunization Activity for additional information.     Screening Questionnaire for Adult Immunization    Are you sick today?   No   Do you have allergies to medications, food, a vaccine component or latex?   No   Have you ever had a serious reaction after receiving a vaccination?   No   Do you have a long-term health problem with heart, lung, kidney, or metabolic disease (e.g., diabetes), asthma, a blood disorder, no spleen, complement component deficiency, a cochlear implant, or a spinal fluid leak?  Are you on long-term aspirin therapy?   Yes   Do you have cancer, leukemia, HIV/AIDS, or any other immune system problem?   No   Do you have a parent, brother, or sister with an immune system problem?   No   In the past 3 months, have you taken medications that affect  your immune system, such as prednisone, other steroids, or anticancer drugs; drugs for the treatment of rheumatoid arthritis, Crohn s disease, or psoriasis; or have you had radiation treatments?   No   Have you had a seizure, or a brain or other nervous system problem?   No   During the past year, have you received a transfusion of blood or blood    products, or been given immune (gamma) globulin or antiviral drug?   No   For women: Are you pregnant or is there a chance you could become       pregnant during the next month?   No   Have you received any vaccinations in the past 4 weeks?   No     Immunization questionnaire answers were not all negative.        Per orders of Dr. Capellan, injection of Shingrix given by James Villegas CMA. Patient instructed to remain in clinic for 15 minutes afterwards, and to report any adverse reaction to me immediately.       Screening performed by James Villegas CMA on 3/12/2021 at 10:24 AM.

## 2021-03-12 NOTE — PATIENT INSTRUCTIONS
Plan:  1. We will decide about levothyroxine dose after we see the TSH results  2.  Labs today - [address]   3. Continue the other meds, same doses for now.  4. Abdomen ultrasound -- To schedule this test you may call Scheduling center at [phone]    5. Shingrix vaccine today

## 2021-03-13 LAB
ALBUMIN SERPL-MCNC: 3.9 G/DL (ref 3.4–5)
ALP SERPL-CCNC: 58 U/L (ref 40–150)
ALT SERPL W P-5'-P-CCNC: 49 U/L (ref 0–50)
ANION GAP SERPL CALCULATED.3IONS-SCNC: 6 MMOL/L (ref 3–14)
AST SERPL W P-5'-P-CCNC: 20 U/L (ref 0–45)
BILIRUB SERPL-MCNC: 0.4 MG/DL (ref 0.2–1.3)
BUN SERPL-MCNC: 16 MG/DL (ref 7–30)
CALCIUM SERPL-MCNC: 9 MG/DL (ref 8.5–10.1)
CHLORIDE SERPL-SCNC: 105 MMOL/L (ref 94–109)
CHOLEST SERPL-MCNC: 207 MG/DL
CO2 SERPL-SCNC: 28 MMOL/L (ref 20–32)
CREAT SERPL-MCNC: 0.98 MG/DL (ref 0.52–1.04)
GFR SERPL CREATININE-BSD FRML MDRD: 65 ML/MIN/{1.73_M2}
GLUCOSE SERPL-MCNC: 90 MG/DL (ref 70–99)
HDLC SERPL-MCNC: 46 MG/DL
LDLC SERPL CALC-MCNC: 140 MG/DL
NONHDLC SERPL-MCNC: 161 MG/DL
POTASSIUM SERPL-SCNC: 3.5 MMOL/L (ref 3.4–5.3)
PROT SERPL-MCNC: 7.4 G/DL (ref 6.8–8.8)
SODIUM SERPL-SCNC: 139 MMOL/L (ref 133–144)
TRIGL SERPL-MCNC: 104 MG/DL
TSH SERPL DL<=0.005 MIU/L-ACNC: 0.77 MU/L (ref 0.4–4)

## 2021-03-13 RX ORDER — LEVOTHYROXINE SODIUM 150 UG/1
150 TABLET ORAL DAILY
Qty: 115 TABLET | Refills: 4 | Status: SHIPPED | OUTPATIENT
Start: 2021-03-13 | End: 2022-03-09

## 2021-03-13 ASSESSMENT — PATIENT HEALTH QUESTIONNAIRE - PHQ9: SUM OF ALL RESPONSES TO PHQ QUESTIONS 1-9: 12

## 2021-04-22 ENCOUNTER — TELEPHONE (OUTPATIENT)
Dept: SURGERY | Facility: CLINIC | Age: 56
End: 2021-04-22

## 2021-04-22 NOTE — TELEPHONE ENCOUNTER
Left voicemail message for patient reminding them to complete the new patient questionnaire before their appointment.    Lexis Mcginnis MA

## 2021-04-23 ENCOUNTER — VIRTUAL VISIT (OUTPATIENT)
Dept: SURGERY | Facility: CLINIC | Age: 56
End: 2021-04-23
Payer: COMMERCIAL

## 2021-04-23 VITALS — HEIGHT: 66 IN | WEIGHT: 244.6 LBS | BODY MASS INDEX: 39.31 KG/M2

## 2021-04-23 DIAGNOSIS — Z53.9 ERRONEOUS ENCOUNTER--DISREGARD: Primary | ICD-10-CM

## 2021-04-23 ASSESSMENT — MIFFLIN-ST. JEOR: SCORE: 1717.28

## 2021-05-03 DIAGNOSIS — R60.0 EDEMA OF BOTH LEGS: ICD-10-CM

## 2021-05-03 RX ORDER — SPIRONOLACTONE 50 MG/1
TABLET, FILM COATED ORAL
Qty: 180 TABLET | Refills: 3 | Status: SHIPPED | OUTPATIENT
Start: 2021-05-03 | End: 2022-03-09

## 2021-08-11 ENCOUNTER — E-VISIT (OUTPATIENT)
Dept: INTERNAL MEDICINE | Facility: CLINIC | Age: 56
End: 2021-08-11
Payer: COMMERCIAL

## 2021-08-11 DIAGNOSIS — F51.01 PRIMARY INSOMNIA: ICD-10-CM

## 2021-08-11 PROCEDURE — 99421 OL DIG E/M SVC 5-10 MIN: CPT | Performed by: INTERNAL MEDICINE

## 2021-08-12 ENCOUNTER — MYC REFILL (OUTPATIENT)
Dept: INTERNAL MEDICINE | Facility: CLINIC | Age: 56
End: 2021-08-12

## 2021-08-12 DIAGNOSIS — F41.9 ANXIETY: ICD-10-CM

## 2021-08-12 RX ORDER — TRAZODONE HYDROCHLORIDE 100 MG/1
100-200 TABLET ORAL AT BEDTIME
Qty: 180 TABLET | Refills: 1 | Status: SHIPPED | OUTPATIENT
Start: 2021-08-12 | End: 2022-02-24

## 2021-08-12 NOTE — TELEPHONE ENCOUNTER
Pending Prescriptions:                       Disp   Refills    LORazepam (ATIVAN) 0.5 MG tablet           20 tab*0        Sig: TAKE ONE-HALF TO ONE TABLET BY MOUTH EVERY 8 HOURS AS           NEEDED FOR ANXIETY    Routing refill request to provider for review/approval because:  Drug not on the [facility] refill protocol

## 2021-08-13 RX ORDER — LORAZEPAM 0.5 MG/1
TABLET ORAL
Qty: 20 TABLET | Refills: 0 | Status: SHIPPED | OUTPATIENT
Start: 2021-08-13 | End: 2022-03-09

## 2021-09-19 ENCOUNTER — HEALTH MAINTENANCE LETTER (OUTPATIENT)
Age: 56
End: 2021-09-19

## 2022-02-23 DIAGNOSIS — F51.01 PRIMARY INSOMNIA: ICD-10-CM

## 2022-02-24 RX ORDER — TRAZODONE HYDROCHLORIDE 100 MG/1
100-200 TABLET ORAL
Qty: 180 TABLET | Refills: 0 | Status: SHIPPED | OUTPATIENT
Start: 2022-02-24 | End: 2022-03-09

## 2022-03-05 ENCOUNTER — HEALTH MAINTENANCE LETTER (OUTPATIENT)
Age: 57
End: 2022-03-05

## 2022-03-09 ENCOUNTER — MYC MEDICAL ADVICE (OUTPATIENT)
Dept: INTERNAL MEDICINE | Facility: CLINIC | Age: 57
End: 2022-03-09

## 2022-03-09 ENCOUNTER — VIRTUAL VISIT (OUTPATIENT)
Dept: INTERNAL MEDICINE | Facility: CLINIC | Age: 57
End: 2022-03-09
Payer: COMMERCIAL

## 2022-03-09 DIAGNOSIS — E66.812 CLASS 2 OBESITY DUE TO EXCESS CALORIES WITHOUT SERIOUS COMORBIDITY WITH BODY MASS INDEX (BMI) OF 35.0 TO 35.9 IN ADULT: Primary | ICD-10-CM

## 2022-03-09 DIAGNOSIS — F41.9 ANXIETY: ICD-10-CM

## 2022-03-09 DIAGNOSIS — R60.0 EDEMA OF BOTH LEGS: ICD-10-CM

## 2022-03-09 DIAGNOSIS — E66.09 CLASS 2 OBESITY DUE TO EXCESS CALORIES WITHOUT SERIOUS COMORBIDITY WITH BODY MASS INDEX (BMI) OF 35.0 TO 35.9 IN ADULT: Primary | ICD-10-CM

## 2022-03-09 DIAGNOSIS — F51.01 PRIMARY INSOMNIA: ICD-10-CM

## 2022-03-09 DIAGNOSIS — E03.9 HYPOTHYROIDISM, UNSPECIFIED TYPE: ICD-10-CM

## 2022-03-09 PROCEDURE — 99213 OFFICE O/P EST LOW 20 MIN: CPT | Mod: 95 | Performed by: INTERNAL MEDICINE

## 2022-03-09 RX ORDER — LEVOTHYROXINE SODIUM 150 UG/1
150 TABLET ORAL DAILY
Qty: 115 TABLET | Refills: 0 | Status: SHIPPED | OUTPATIENT
Start: 2022-03-09 | End: 2022-06-06

## 2022-03-09 RX ORDER — TRAZODONE HYDROCHLORIDE 100 MG/1
100-200 TABLET ORAL
Qty: 180 TABLET | Refills: 0 | Status: SHIPPED | OUTPATIENT
Start: 2022-03-09 | End: 2022-06-06

## 2022-03-09 RX ORDER — LORAZEPAM 0.5 MG/1
TABLET ORAL
Qty: 20 TABLET | Refills: 0 | Status: SHIPPED | OUTPATIENT
Start: 2022-03-09 | End: 2022-06-06

## 2022-03-09 RX ORDER — SPIRONOLACTONE 50 MG/1
TABLET, FILM COATED ORAL
Qty: 180 TABLET | Refills: 0 | Status: SHIPPED | OUTPATIENT
Start: 2022-03-09 | End: 2022-06-06

## 2022-03-09 RX ORDER — HYDROCHLOROTHIAZIDE 25 MG/1
25 TABLET ORAL DAILY
Qty: 90 TABLET | Refills: 0 | Status: SHIPPED | OUTPATIENT
Start: 2022-03-09 | End: 2022-06-06

## 2022-03-09 ASSESSMENT — PATIENT HEALTH QUESTIONNAIRE - PHQ9: SUM OF ALL RESPONSES TO PHQ QUESTIONS 1-9: 8

## 2022-03-09 NOTE — PATIENT INSTRUCTIONS
Plan:  1. Letter for the weight loss program  2. Continue same meds, same doses for now   3. Schedule ANNUAL EXAM   4. Schedule fasting labs few days before the annual exam

## 2022-03-09 NOTE — PROGRESS NOTES
Brynn is a 56 year old who is being evaluated via a billable video visit.    Patient is being seen to arrange for TSH, request letter medical necessity for weight loss for program and refills.  How would you like to obtain your AVS? Mail a copy  If the video visit is dropped, the invitation should be resent by: Text to cell phone: [phone]  Will anyone else be joining your video visit? No        This is a VIDEO ( using Doximity)  encounter with the patient.       Location of the provider : office   Location of the patient : home      07:33 --- 07:49             Dr Capellan's note      Patient's instructions / PLAN:                                                        Plan:  1. Letter for the weight loss program  2. Continue same meds, same doses for now   3. Schedule ANNUAL EXAM   4. Schedule fasting labs few days before the annual exam       ASSESSMENT & PLAN:                                                      (E66.09,  Z68.35) Class 2 obesity due to excess calories without serious comorbidity with body mass index (BMI) of 35.0 to 35.9 in adult  (primary encounter diagnosis)  Comment:   Plan: Lipid panel reflex to direct LDL Fasting,         Comprehensive metabolic panel, CK total, CBC         with platelets, TSH with free T4 reflex        Letter     (R60.0) Edema of both legs  Comment: Controlled    Plan: hydrochlorothiazide (HYDRODIURIL) 25 MG tablet,        spironolactone (ALDACTONE) 50 MG tablet, Lipid         panel reflex to direct LDL Fasting,         Comprehensive metabolic panel, CK total, CBC         with platelets, TSH with free T4 reflex            (E03.9) Hypothyroidism, unspecified type  Comment:   Plan: levothyroxine (SYNTHROID/LEVOTHROID) 150 MCG         tablet, Lipid panel reflex to direct LDL         Fasting, Comprehensive metabolic panel, CK         total, CBC with platelets, TSH with free T4         reflex            (F51.01) Primary insomnia  Comment:   Plan: traZODone (DESYREL) 100 MG  tablet, Lipid panel         reflex to direct LDL Fasting, Comprehensive         metabolic panel, CK total, CBC with platelets,         TSH with free T4 reflex            (F41.9) Anxiety  Comment:   Plan: Lipid panel reflex to direct LDL Fasting,         Comprehensive metabolic panel, CK total, CBC         with platelets, TSH with free T4 reflex               Chief complaint:                                                      Follow up chronic medical problems      SUBJECTIVE:                                                    History of present illness:    She is in a weight loss program and she needs a letter to recommend   -- started at BMI 42.5  -- lost 30 lb  -- present  wt 228    Ms Jocelyne Finley is under my medical professional care. She has obesity and I recommended her to enroll in a weight loss program.         History of Present Illness       Reason for visit:  Follow up/med refill/letter  Symptom onset:  Today  Symptoms include:  N/A  Symptom intensity:  Mild  Symptom progression:  Staying the same  Had these symptoms before:  Yes  Has tried/received treatment for these symptoms:  Yes  Previous treatment was successful:  Yes  Prior treatment description:  Prescribed medication  What makes it worse:  N/a  What makes it better:  N/a    She eats 4 or more servings of fruits and vegetables daily.She consumes 0 sweetened beverage(s) daily.She exercises with enough effort to increase her heart rate 10 to 19 minutes per day.  She exercises with enough effort to increase her heart rate 4 days per week.   She is taking medications regularly.       Review of Systems:                                                      ROS: negative for fever, chills, cough, wheezes, chest pain, shortness of breath, vomiting, abdominal pain, leg swelling         OBJECTIVE:           An actual physical exam can't be done during phone visit   A limited exam can sometimes be performed by video visit   NAD      PMHx:  reviewed  Past Medical History:   Diagnosis Date     Antiphospholipid antibodies positive 12/14/2011     Arthritis      Baker's cyst of knee      Hypothyroidism      Obesity       PSHx: reviewed  Past Surgical History:   Procedure Laterality Date     Novasure endometrial ablation  10/2007        Meds: reviewed  Current Outpatient Medications   Medication Sig Dispense Refill     hydrochlorothiazide (HYDRODIURIL) 25 MG tablet Take 1 tablet (25 mg) by mouth daily 90 tablet 1     levothyroxine (SYNTHROID/LEVOTHROID) 150 MCG tablet Take 1 tablet (150 mcg) by mouth daily Take 300 mg by mouth on Monday and Friday. Take 150 mg by mouth all other days. 115 tablet 4     LORazepam (ATIVAN) 0.5 MG tablet TAKE ONE-HALF TO ONE TABLET BY MOUTH EVERY 8 HOURS AS NEEDED FOR ANXIETY 20 tablet 0     predniSONE (DELTASONE) 5 MG tablet 4 tabs daily for 1 week, then 3 tabs daily for 1 week 50 tablet 0     spironolactone (ALDACTONE) 50 MG tablet TAKE TWO TABLETS BY MOUTH ONCE DAILY 180 tablet 3     traZODone (DESYREL) 100 MG tablet Take 1-2 tablets (100-200 mg) by mouth nightly as needed for sleep -- For further refills patient needs appointment 180 tablet 0       Soc Hx: reviewed  Fam Hx: reviewed          Harika Capellan MD  Internal Medicine

## 2022-04-05 ENCOUNTER — TELEPHONE (OUTPATIENT)
Dept: INTERNAL MEDICINE | Facility: CLINIC | Age: 57
End: 2022-04-05
Payer: COMMERCIAL

## 2022-04-05 DIAGNOSIS — R23.9 SKIN CHANGE: Primary | ICD-10-CM

## 2022-04-07 ENCOUNTER — TELEPHONE (OUTPATIENT)
Dept: INTERNAL MEDICINE | Facility: CLINIC | Age: 57
End: 2022-04-07
Payer: COMMERCIAL

## 2022-04-07 NOTE — TELEPHONE ENCOUNTER
Message       ----- Message -----   From: Amita Finley   Sent: 4/5/2022  11:34 AM CDT   To: [facility] Referral   Subject: Referral Request                                   Amita Finley would like to request a referral.   Reason: Multiple moles, but one irreg and changing size on hand   Requested provider: Derm   Comment:   I would like a referral to Derm.  I thought I used to be able to make these appts without referral in the past.  Maybe that changed?  Thank you,      Brynn

## 2022-04-08 NOTE — TELEPHONE ENCOUNTER
"Call placed to patient. Left message advising of referral. Asked to return a call if needed.    \"Please be aware that coverage of these services is subject to the terms and limitations of your health insurance plan.  Call member services at your health plan with any benefit or coverage questions.  [facility] will call you to coordinate your care as prescribed by your provider. If you don't hear from a representative within 2 business days, please call [phone]\"  "

## 2022-04-25 ENCOUNTER — MYC MEDICAL ADVICE (OUTPATIENT)
Dept: INTERNAL MEDICINE | Facility: CLINIC | Age: 57
End: 2022-04-25
Payer: COMMERCIAL

## 2022-04-26 ENCOUNTER — MEDICAL CORRESPONDENCE (OUTPATIENT)
Dept: HEALTH INFORMATION MANAGEMENT | Facility: CLINIC | Age: 57
End: 2022-04-26

## 2022-04-26 NOTE — TELEPHONE ENCOUNTER
[employer] message sent to inform patient of letter signed and faxed.     Lilian MORALES RN   [facility]

## 2022-04-30 ENCOUNTER — HEALTH MAINTENANCE LETTER (OUTPATIENT)
Age: 57
End: 2022-04-30

## 2022-06-06 ENCOUNTER — OFFICE VISIT (OUTPATIENT)
Dept: INTERNAL MEDICINE | Facility: CLINIC | Age: 57
End: 2022-06-06
Payer: COMMERCIAL

## 2022-06-06 VITALS
HEIGHT: 66 IN | OXYGEN SATURATION: 99 % | TEMPERATURE: 97.5 F | WEIGHT: 233.6 LBS | RESPIRATION RATE: 18 BRPM | HEART RATE: 96 BPM | BODY MASS INDEX: 37.54 KG/M2 | DIASTOLIC BLOOD PRESSURE: 75 MMHG | SYSTOLIC BLOOD PRESSURE: 114 MMHG

## 2022-06-06 DIAGNOSIS — M19.90 INFLAMMATORY ARTHRITIS: ICD-10-CM

## 2022-06-06 DIAGNOSIS — F41.9 ANXIETY: ICD-10-CM

## 2022-06-06 DIAGNOSIS — D12.6 ADENOMATOUS POLYP OF COLON, UNSPECIFIED PART OF COLON: ICD-10-CM

## 2022-06-06 DIAGNOSIS — E78.5 HYPERLIPIDEMIA LDL GOAL <160: ICD-10-CM

## 2022-06-06 DIAGNOSIS — R60.0 EDEMA OF BOTH LEGS: ICD-10-CM

## 2022-06-06 DIAGNOSIS — Z00.00 ROUTINE GENERAL MEDICAL EXAMINATION AT A HEALTH CARE FACILITY: Primary | ICD-10-CM

## 2022-06-06 DIAGNOSIS — F33.1 MODERATE EPISODE OF RECURRENT MAJOR DEPRESSIVE DISORDER (H): ICD-10-CM

## 2022-06-06 DIAGNOSIS — E03.9 HYPOTHYROIDISM, UNSPECIFIED TYPE: ICD-10-CM

## 2022-06-06 DIAGNOSIS — Z12.31 ENCOUNTER FOR SCREENING MAMMOGRAM FOR BREAST CANCER: ICD-10-CM

## 2022-06-06 DIAGNOSIS — F51.01 PRIMARY INSOMNIA: ICD-10-CM

## 2022-06-06 LAB
ALBUMIN SERPL-MCNC: 3.7 G/DL (ref 3.4–5)
ALP SERPL-CCNC: 57 U/L (ref 40–150)
ALT SERPL W P-5'-P-CCNC: 21 U/L (ref 0–50)
ANION GAP SERPL CALCULATED.3IONS-SCNC: 5 MMOL/L (ref 3–14)
AST SERPL W P-5'-P-CCNC: 10 U/L (ref 0–45)
BILIRUB SERPL-MCNC: 0.3 MG/DL (ref 0.2–1.3)
BUN SERPL-MCNC: 15 MG/DL (ref 7–30)
CALCIUM SERPL-MCNC: 8.9 MG/DL (ref 8.5–10.1)
CHLORIDE BLD-SCNC: 108 MMOL/L (ref 94–109)
CHOLEST SERPL-MCNC: 191 MG/DL
CK SERPL-CCNC: 155 U/L (ref 30–225)
CO2 SERPL-SCNC: 28 MMOL/L (ref 20–32)
CREAT SERPL-MCNC: 0.92 MG/DL (ref 0.52–1.04)
ERYTHROCYTE [DISTWIDTH] IN BLOOD BY AUTOMATED COUNT: 12.7 % (ref 10–15)
FASTING STATUS PATIENT QL REPORTED: YES
GFR SERPL CREATININE-BSD FRML MDRD: 73 ML/MIN/1.73M2
GLUCOSE BLD-MCNC: 98 MG/DL (ref 70–99)
HCT VFR BLD AUTO: 38.2 % (ref 35–47)
HDLC SERPL-MCNC: 45 MG/DL
HGB BLD-MCNC: 12.8 G/DL (ref 11.7–15.7)
LDLC SERPL CALC-MCNC: 120 MG/DL
MCH RBC QN AUTO: 28.5 PG (ref 26.5–33)
MCHC RBC AUTO-ENTMCNC: 33.5 G/DL (ref 31.5–36.5)
MCV RBC AUTO: 85 FL (ref 78–100)
NONHDLC SERPL-MCNC: 146 MG/DL
PLATELET # BLD AUTO: 233 10E3/UL (ref 150–450)
POTASSIUM BLD-SCNC: 3.7 MMOL/L (ref 3.4–5.3)
PROT SERPL-MCNC: 7.2 G/DL (ref 6.8–8.8)
RBC # BLD AUTO: 4.49 10E6/UL (ref 3.8–5.2)
SODIUM SERPL-SCNC: 141 MMOL/L (ref 133–144)
TRIGL SERPL-MCNC: 132 MG/DL
TSH SERPL DL<=0.005 MIU/L-ACNC: 0.66 MU/L (ref 0.4–4)
WBC # BLD AUTO: 6 10E3/UL (ref 4–11)

## 2022-06-06 PROCEDURE — 99214 OFFICE O/P EST MOD 30 MIN: CPT | Mod: 25 | Performed by: INTERNAL MEDICINE

## 2022-06-06 PROCEDURE — 84443 ASSAY THYROID STIM HORMONE: CPT | Performed by: INTERNAL MEDICINE

## 2022-06-06 PROCEDURE — 85027 COMPLETE CBC AUTOMATED: CPT | Performed by: INTERNAL MEDICINE

## 2022-06-06 PROCEDURE — 80061 LIPID PANEL: CPT | Performed by: INTERNAL MEDICINE

## 2022-06-06 PROCEDURE — 80053 COMPREHEN METABOLIC PANEL: CPT | Performed by: INTERNAL MEDICINE

## 2022-06-06 PROCEDURE — 99396 PREV VISIT EST AGE 40-64: CPT | Performed by: INTERNAL MEDICINE

## 2022-06-06 PROCEDURE — 36415 COLL VENOUS BLD VENIPUNCTURE: CPT | Performed by: INTERNAL MEDICINE

## 2022-06-06 PROCEDURE — 82550 ASSAY OF CK (CPK): CPT | Performed by: INTERNAL MEDICINE

## 2022-06-06 RX ORDER — LEVOTHYROXINE SODIUM 150 UG/1
150 TABLET ORAL DAILY
Qty: 115 TABLET | Refills: 1 | Status: SHIPPED | OUTPATIENT
Start: 2022-06-06 | End: 2023-01-09

## 2022-06-06 RX ORDER — TRAZODONE HYDROCHLORIDE 100 MG/1
100-200 TABLET ORAL
Qty: 180 TABLET | Refills: 1 | Status: SHIPPED | OUTPATIENT
Start: 2022-06-06 | End: 2022-12-23

## 2022-06-06 RX ORDER — BUSPIRONE HYDROCHLORIDE 5 MG/1
5 TABLET ORAL 3 TIMES DAILY
Qty: 90 TABLET | Refills: 3 | Status: SHIPPED | OUTPATIENT
Start: 2022-06-06 | End: 2022-07-18

## 2022-06-06 RX ORDER — SPIRONOLACTONE 50 MG/1
TABLET, FILM COATED ORAL
Qty: 180 TABLET | Refills: 1 | Status: SHIPPED | OUTPATIENT
Start: 2022-06-06 | End: 2023-03-22

## 2022-06-06 RX ORDER — LORAZEPAM 0.5 MG/1
TABLET ORAL
Qty: 20 TABLET | Refills: 0 | Status: SHIPPED | OUTPATIENT
Start: 2022-06-06 | End: 2023-11-07

## 2022-06-06 RX ORDER — HYDROCHLOROTHIAZIDE 25 MG/1
25 TABLET ORAL DAILY
Qty: 90 TABLET | Refills: 0 | Status: SHIPPED | OUTPATIENT
Start: 2022-06-06 | End: 2022-11-04

## 2022-06-06 ASSESSMENT — ENCOUNTER SYMPTOMS
FEVER: 0
CHILLS: 0
DIZZINESS: 0
EYE PAIN: 0
SHORTNESS OF BREATH: 0
HEMATOCHEZIA: 0
HEARTBURN: 0
DIARRHEA: 0
JOINT SWELLING: 1
SORE THROAT: 0
DYSURIA: 0
CONSTIPATION: 0
PARESTHESIAS: 0
NERVOUS/ANXIOUS: 1
FREQUENCY: 0
ARTHRALGIAS: 1
COUGH: 0
HEADACHES: 0
WEAKNESS: 0
ABDOMINAL PAIN: 0
MYALGIAS: 0
HEMATURIA: 0
NAUSEA: 0
PALPITATIONS: 0

## 2022-06-06 ASSESSMENT — PATIENT HEALTH QUESTIONNAIRE - PHQ9
SUM OF ALL RESPONSES TO PHQ QUESTIONS 1-9: 11
SUM OF ALL RESPONSES TO PHQ QUESTIONS 1-9: 11
10. IF YOU CHECKED OFF ANY PROBLEMS, HOW DIFFICULT HAVE THESE PROBLEMS MADE IT FOR YOU TO DO YOUR WORK, TAKE CARE OF THINGS AT HOME, OR GET ALONG WITH OTHER PEOPLE: SOMEWHAT DIFFICULT

## 2022-06-06 NOTE — PROGRESS NOTES
Dr Capellan's note    Patient's instructions / PLAN:                                                        Plan:  1. Continue same meds, same doses for now   2. Add Buspirone 5 mg 3 times a day - it can be prn   3. Counselor referral   4. Schedule video appointment in about a month to see how the anxiety/depression is responding to meds   5. Mammogram ( please call [phone] to schedule it)   6.  Labs today - [address]         ASSESSMENT & PLAN:                                                      (Z00.00) Routine general medical examination at a health care facility  (primary encounter diagnosis)  Comment:   Plan: CBC with platelets, CK total, Comprehensive         metabolic panel, TSH with free T4 reflex            (F41.9) Anxiety  Comment: Not controlled   We discussed about the new meds, advantages and potential side effects. The patient will read also the info from the pharmacy and call back if questions.   Plan: busPIRone (BUSPAR) 5 MG tablet, Adult Mental         Health  Referral, LORazepam (ATIVAN)         0.5 MG tablet            (F33.1) Moderate episode of recurrent major depressive disorder (H)  Comment: Not controlled   Plan: Adult Mental Health  Referral            (E78.5) Hyperlipidemia LDL goal <160  Comment:   Plan: CBC with platelets, CK total, Lipid panel         reflex to direct LDL Fasting, Comprehensive         metabolic panel, TSH with free T4 reflex            (E03.9) Hypothyroidism, unspecified type  Comment:   Plan: levothyroxine (SYNTHROID/LEVOTHROID) 150 MCG         tablet, CBC with platelets, CK total, Lipid         panel reflex to direct LDL Fasting,         Comprehensive metabolic panel, TSH with free T4        reflex            (M19.90) Inflammatory arthritis  Comment:   Plan: CBC with platelets, CK total, Lipid panel         reflex to direct LDL Fasting, Comprehensive         metabolic panel, TSH with free T4 reflex            (R60.0) Edema of both  legs  Comment: Controlled    Plan: spironolactone (ALDACTONE) 50 MG tablet,         hydrochlorothiazide (HYDRODIURIL) 25 MG tablet,        CBC with platelets, CK total, Lipid panel         reflex to direct LDL Fasting, Comprehensive         metabolic panel, TSH with free T4 reflex            (F51.01) Primary insomnia  Comment:   Plan: traZODone (DESYREL) 100 MG tablet            (D12.6) Adenomatous polyp of colon - need colonoscopy 2024  Comment:   Plan:     (Z12.31) Encounter for screening mammogram for breast cancer  Comment:   Plan: MA Screening Digital Bilateral               Chief Complaint:                                                        Annual exam  Follow up chronic medical problems      SUBJECTIVE:                                                    History of present illness     We reviewed the chronic medical problems as above.   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 She denies plans  -- she thinks it is more anxiety than depression. she would like to avoid selective serotonin reuptake inhibitor because of the weight gain  -- she thinks it is related w the work pressure ( 64h/w) + ill father     ROS:     See below       PMHx: - reviewed  Past Medical History:   Diagnosis Date     Antiphospholipid antibodies positive 12/14/2011     Arthritis      Baker's cyst of knee      Hypothyroidism      Obesity        PSHx: reviewed  Past Surgical History:   Procedure Laterality Date     Novasure endometrial ablation  10/2007        Soc Hx: No daily alcohol, no smoking  Social History     Socioeconomic History     Marital status:      Spouse name: Not on file     Number of children: 2     Years of education: Not on file     Highest education level: Not on file   Occupational History     Occupation: nurse     Employer: [employer]     Comment: [employer]    Tobacco Use     Smoking status: Former Smoker     Packs/day: 1.50     Years: 20.00     Pack years: 30.00     Types: Cigarettes     Quit date: 1996     Years since quittin.4     Smokeless tobacco: Never Used   Vaping Use     Vaping Use: Never used   Substance and Sexual Activity     Alcohol use: Yes     Alcohol/week: 3.0 standard drinks     Types: 3 Standard drinks or equivalent per week     Comment: wine on the weekend     Drug use: No     Sexual activity: Yes     Partners: Male     Birth control/protection: Surgical   Other Topics Concern     Parent/sibling w/ CABG, MI or angioplasty before 65F 55M? Not Asked   Social History Narrative     Not on file     Social Determinants of Health     Financial Resource Strain: Not on file   Food Insecurity: Not on file   Transportation Needs: Not on file   Physical Activity: Not on file   Stress: Not on file   Social Connections: Not on file   Intimate Partner Violence: Not on file   Housing Stability: Not on file        Fam Hx: reviewed  Family History   Problem Relation Age of Onset     Gastrointestinal Disease Mother         in 60's     Hypertension Mother      C.A.D. Father      Diabetes Father      Cardiovascular Father         First MI age 66     Glaucoma Father      Hypertension Father      No Known Problems Sister      No Known Problems Brother      Cancer Maternal Grandmother      Thyroid Disease Daughter      Hypothyroidism Daughter      Macular Degeneration No family hx of          Screening: reviewed      All: reviewed    Meds: reviewed  Current Outpatient Medications   Medication Sig Dispense Refill     hydrochlorothiazide (HYDRODIURIL) 25 MG tablet Take 1 tablet (25 mg) by mouth daily 90 tablet 0     levothyroxine (SYNTHROID/LEVOTHROID) 150 MCG tablet Take 1 tablet (150 mcg) by mouth daily Take 300 mg by mouth on Monday and Friday. Take 150 mg by mouth all other days. 115 tablet 0     LORazepam (ATIVAN) 0.5 MG tablet TAKE ONE-HALF TO ONE TABLET BY  "MOUTH EVERY 8 HOURS AS NEEDED FOR ANXIETY 20 tablet 0     predniSONE (DELTASONE) 5 MG tablet 4 tabs daily for 1 week, then 3 tabs daily for 1 week 50 tablet 0     spironolactone (ALDACTONE) 50 MG tablet TAKE TWO TABLETS BY MOUTH ONCE DAILY 180 tablet 0     traZODone (DESYREL) 100 MG tablet Take 1-2 tablets (100-200 mg) by mouth nightly as needed for sleep 180 tablet 0       OBJECTIVE:                                                    Physical Exam :  Blood pressure 114/75, pulse 96, temperature 97.5  F (36.4  C), temperature source Tympanic, resp. rate 18, height 1.67 m (5' 5.75\"), weight 106 kg (233 lb 9.6 oz), SpO2 99 %, not currently breastfeeding.     NAD, appears comfortable  Skin clear, no rashes  Neck: supple, no JVD,  no thyroidmegaly  Lymph nodes non palpable in the cervical, supraclavicular axillaries,   Chest: clear to auscultation with good respiratory effort  Cardiac: S1S2, RRR, no mgr appreciated  Abdomen: soft, not tender, not distended, audible bowel sound, no hepatosplenomegaly, no palpable masses, no abdominal bruits  Extremities: no cyanosis, clubbing or edema.   Neuro: A, Ox3, no focal signs.  Breast exam in supine and erect position: they are symmetrical, no skin changes, no tenderness or nodes on palpation. Nipples are erect, no skin lesions, no discharge on pressure.    Pelvic exam: deferred, no symptoms, no hx of abnormal pap         Harika Capellan MD  Internal Medicine        SUBJECTIVE:   CC: Amita Finley is an 56 year old woman who presents for preventive health visit.       Patient has been advised of split billing requirements and indicates understanding: Yes  Healthy Habits:     Getting at least 3 servings of Calcium per day:  Yes    Bi-annual eye exam:  Yes    Dental care twice a year:  Yes    Sleep apnea or symptoms of sleep apnea:  None    Diet:  Regular (no restrictions)    Frequency of exercise:  1 day/week    Duration of exercise:  Less than 15 minutes    Taking " medications regularly:  Yes    Medication side effects:  None    PHQ-2 Total Score: 2    Additional concerns today:  Yes          Today's PHQ-2 Score:   PHQ-2 (  [employer]) 2022   Q1: Little interest or pleasure in doing things 0   Q2: Feeling down, depressed or hopeless 2   PHQ-2 Score 2   PHQ-2 Total Score (12-17 Years)- Positive if 3 or more points; Administer PHQ-A if positive -   Q1: Little interest or pleasure in doing things Not at all   Q2: Feeling down, depressed or hopeless More than half the days   PHQ-2 Score 2       Abuse: Current or Past (Physical, Sexual or Emotional) - No  Do you feel safe in your environment? Yes        Social History     Tobacco Use     Smoking status: Former Smoker     Packs/day: 1.50     Years: 20.00     Pack years: 30.00     Types: Cigarettes     Quit date: 1996     Years since quittin.4     Smokeless tobacco: Never Used   Substance Use Topics     Alcohol use: Yes     Alcohol/week: 3.0 standard drinks     Types: 3 Standard drinks or equivalent per week     Comment: wine on the weekend         Alcohol Use 2022   Prescreen: >3 drinks/day or >7 drinks/week? No   Prescreen: >3 drinks/day or >7 drinks/week? -       Reviewed orders with patient.  Reviewed health maintenance and updated orders accordingly - Yes  Labs reviewed in EPIC    Breast Cancer Screening:    FHS-7:   Breast CA Risk Assessment (FHS-7) 2022   Did any of your first-degree relatives have breast or ovarian cancer? No   Did any of your relatives have bilateral breast cancer? No         Pertinent mammograms are reviewed under the imaging tab.    History of abnormal Pap smear:   PAP / HPV Latest Ref Rng & Units 2019 2014 12/3/2010   PAP (Historical) - NIL NIL NIL   HPV16 NEG:Negative Negative - -   HPV18 NEG:Negative Negative - -   HRHPV NEG:Negative Negative - -     Reviewed and updated as needed this visit by clinical staff   Tobacco  Allergies  Meds   Med Hx  Surg Hx  Fam Hx  Soc  "Hx          Reviewed and updated as needed this visit by Provider                     Review of Systems   Constitutional: Negative for chills and fever.   HENT: Negative for congestion, ear pain, hearing loss and sore throat.    Eyes: Negative for pain and visual disturbance.   Respiratory: Negative for cough and shortness of breath.    Cardiovascular: Positive for peripheral edema. Negative for chest pain and palpitations.   Gastrointestinal: Negative for abdominal pain, constipation, diarrhea, heartburn, hematochezia and nausea.   Genitourinary: Negative for dysuria, frequency, genital sores, hematuria and urgency.   Musculoskeletal: Positive for arthralgias and joint swelling. Negative for myalgias.   Skin: Negative for rash.   Neurological: Negative for dizziness, weakness, headaches and paresthesias.   Psychiatric/Behavioral: Negative for mood changes. The patient is nervous/anxious.      Edema -- hydrochlorothiazide prn helps    Patient has been advised of split billing requirements and indicates understanding: Yes At the check in, at the      COUNSELING:  Reviewed preventive health counseling, as reflected in patient instructions       Regular exercise       Healthy diet/nutrition    Estimated body mass index is 39.78 kg/m  as calculated from the following:    Height as of this encounter: 1.67 m (5' 5.75\").    Weight as of 4/23/21: 110.9 kg (244 lb 9.6 oz).    Weight management plan: Discussed healthy diet and exercise guidelines    She reports that she quit smoking about 26 years ago. Her smoking use included cigarettes. She has a 30.00 pack-year smoking history. She has never used smokeless tobacco.      Counseling Resources:  ATP IV Guidelines  Pooled Cohorts Equation Calculator  Breast Cancer Risk Calculator  BRCA-Related Cancer Risk Assessment: FHS-7 Tool  FRAX Risk Assessment  ICSI Preventive Guidelines  Dietary Guidelines for Americans, 2010  USDA's MyPlate  ASA Prophylaxis  Lung CA " Screening    Hariak Michel MD  [facility]  Answers for HPI/ROS submitted by the patient on 6/6/2022  If you checked off any problems, how difficult have these problems made it for you to do your work, take care of things at home, or get along with other people?: Somewhat difficult  PHQ9 TOTAL SCORE: 11

## 2022-06-06 NOTE — PATIENT INSTRUCTIONS
Plan:  1. Continue same meds, same doses for now   2. Add Buspirone 5 mg 3 times a day - it can be prn   3. Counselor referral   4. Schedule video appointment in about a month to see how the anxiety/depression is responding to meds   5. Mammogram ( please call [phone] to schedule it)   6.  Labs today - [address]

## 2022-06-20 ENCOUNTER — HOSPITAL ENCOUNTER (OUTPATIENT)
Dept: MAMMOGRAPHY | Facility: CLINIC | Age: 57
Discharge: HOME | End: 2022-06-20
Attending: INTERNAL MEDICINE | Admitting: INTERNAL MEDICINE
Payer: COMMERCIAL

## 2022-06-20 DIAGNOSIS — Z12.31 ENCOUNTER FOR SCREENING MAMMOGRAM FOR BREAST CANCER: ICD-10-CM

## 2022-06-20 PROCEDURE — 77067 SCR MAMMO BI INCL CAD: CPT

## 2022-07-18 ENCOUNTER — VIRTUAL VISIT (OUTPATIENT)
Dept: INTERNAL MEDICINE | Facility: CLINIC | Age: 57
End: 2022-07-18
Payer: COMMERCIAL

## 2022-07-18 DIAGNOSIS — F41.9 ANXIETY: ICD-10-CM

## 2022-07-18 DIAGNOSIS — Z11.4 SCREENING FOR HIV (HUMAN IMMUNODEFICIENCY VIRUS): Primary | ICD-10-CM

## 2022-07-18 PROCEDURE — 99213 OFFICE O/P EST LOW 20 MIN: CPT | Mod: 95 | Performed by: INTERNAL MEDICINE

## 2022-07-18 RX ORDER — BUSPIRONE HYDROCHLORIDE 5 MG/1
5 TABLET ORAL 3 TIMES DAILY
Qty: 270 TABLET | Refills: 3 | Status: SHIPPED | OUTPATIENT
Start: 2022-07-18 | End: 2023-07-18

## 2022-07-18 ASSESSMENT — ANXIETY QUESTIONNAIRES
6. BECOMING EASILY ANNOYED OR IRRITABLE: NOT AT ALL
5. BEING SO RESTLESS THAT IT IS HARD TO SIT STILL: NOT AT ALL
GAD7 TOTAL SCORE: 4
GAD7 TOTAL SCORE: 4
1. FEELING NERVOUS, ANXIOUS, OR ON EDGE: SEVERAL DAYS
7. FEELING AFRAID AS IF SOMETHING AWFUL MIGHT HAPPEN: SEVERAL DAYS
8. IF YOU CHECKED OFF ANY PROBLEMS, HOW DIFFICULT HAVE THESE MADE IT FOR YOU TO DO YOUR WORK, TAKE CARE OF THINGS AT HOME, OR GET ALONG WITH OTHER PEOPLE?: NOT DIFFICULT AT ALL
2. NOT BEING ABLE TO STOP OR CONTROL WORRYING: NOT AT ALL
4. TROUBLE RELAXING: SEVERAL DAYS
7. FEELING AFRAID AS IF SOMETHING AWFUL MIGHT HAPPEN: SEVERAL DAYS
GAD7 TOTAL SCORE: 4
3. WORRYING TOO MUCH ABOUT DIFFERENT THINGS: SEVERAL DAYS

## 2022-07-18 ASSESSMENT — PATIENT HEALTH QUESTIONNAIRE - PHQ9
10. IF YOU CHECKED OFF ANY PROBLEMS, HOW DIFFICULT HAVE THESE PROBLEMS MADE IT FOR YOU TO DO YOUR WORK, TAKE CARE OF THINGS AT HOME, OR GET ALONG WITH OTHER PEOPLE: SOMEWHAT DIFFICULT
SUM OF ALL RESPONSES TO PHQ QUESTIONS 1-9: 5
SUM OF ALL RESPONSES TO PHQ QUESTIONS 1-9: 5

## 2022-07-18 NOTE — PROGRESS NOTES
Brynn is a 56 year old who is being evaluated via a billable video visit.      How would you like to obtain your AVS? Mail a copy  If the video visit is dropped, the invitation should be resent by: Text to cell phone: [phone]  Will anyone else be joining your video visit? No        This is a VIDEO ( using Doximity)  encounter with the patient.       Location of the provider : office   Location of the patient : home      07:35 --- 07:45             Dr Capellan's note      Patient's instructions / PLAN:                                                        Plan:  1. Continue same meds, same doses for now   2. Annual exam June 2023      ASSESSMENT & PLAN:                                                      (F41.9) Anxiety  Comment: Controlled    Plan: busPIRone (BUSPAR) 5 MG tablet               Chief complaint:                                                      F/u anxiety      SUBJECTIVE:                                                    History of present illness:    Anxiety  -- Buspirone is helping  -- she takes 5 mg 2 times a day - sometimes 3 times a day   -- no side effects     LOV: Plan:  1. Continue same meds, same doses for now   2. Add Buspirone 5 mg 3 times a day - it can be prn   3. Counselor referral   4. Schedule video appointment in about a month to see how the anxiety/depression is responding to meds   5. Mammogram ( please call [phone] to schedule it)   6.  Labs today - [address]           History of Present Illness       She eats 2-3 servings of fruits and vegetables daily.She consumes 0 sweetened beverage(s) daily.          Review of Systems:                                                      ROS: negative for fever, chills, cough, wheezes, chest pain, shortness of breath, vomiting, abdominal pain, leg swelling       OBJECTIVE:           An actual physical exam can't be done during phone visit   A limited exam can sometimes be performed by video visit   NAD      PMHx: reviewed  Past  Medical History:   Diagnosis Date     Antiphospholipid antibodies positive 12/14/2011     Arthritis      Baker's cyst of knee      Hypothyroidism      Obesity       PSHx: reviewed  Past Surgical History:   Procedure Laterality Date     Novasure endometrial ablation  10/2007        Meds: reviewed  Current Outpatient Medications   Medication Sig Dispense Refill     busPIRone (BUSPAR) 5 MG tablet Take 1 tablet (5 mg) by mouth 3 times daily 90 tablet 3     hydrochlorothiazide (HYDRODIURIL) 25 MG tablet Take 1 tablet (25 mg) by mouth daily 90 tablet 0     levothyroxine (SYNTHROID/LEVOTHROID) 150 MCG tablet Take 1 tablet (150 mcg) by mouth daily Take 300 mg by mouth on Monday and Friday. Take 150 mg by mouth all other days. 115 tablet 1     LORazepam (ATIVAN) 0.5 MG tablet TAKE ONE-HALF TO ONE TABLET BY MOUTH EVERY 8 HOURS AS NEEDED FOR ANXIETY 20 tablet 0     predniSONE (DELTASONE) 5 MG tablet 4 tabs daily for 1 week, then 3 tabs daily for 1 week 50 tablet 0     spironolactone (ALDACTONE) 50 MG tablet TAKE TWO TABLETS BY MOUTH ONCE DAILY 180 tablet 1     traZODone (DESYREL) 100 MG tablet Take 1-2 tablets (100-200 mg) by mouth nightly as needed for sleep 180 tablet 1       Soc Hx: reviewed  Fam Hx: reviewed          Harika Capellan MD  Internal Medicine

## 2022-08-03 ENCOUNTER — OFFICE VISIT (OUTPATIENT)
Dept: FAMILY MEDICINE | Facility: CLINIC | Age: 57
End: 2022-08-03
Payer: COMMERCIAL

## 2022-08-03 DIAGNOSIS — Z12.83 ENCOUNTER FOR SCREENING FOR MALIGNANT NEOPLASM OF SKIN: ICD-10-CM

## 2022-08-03 DIAGNOSIS — L81.4 LENTIGINES: ICD-10-CM

## 2022-08-03 DIAGNOSIS — L72.0 EPIDERMAL CYST: ICD-10-CM

## 2022-08-03 DIAGNOSIS — D48.9 NEOPLASM OF UNCERTAIN BEHAVIOR: ICD-10-CM

## 2022-08-03 DIAGNOSIS — D22.9 MULTIPLE BENIGN NEVI: Primary | ICD-10-CM

## 2022-08-03 DIAGNOSIS — D18.01 CHERRY ANGIOMA: ICD-10-CM

## 2022-08-03 DIAGNOSIS — L82.1 SEBORRHEIC KERATOSES: ICD-10-CM

## 2022-08-03 PROCEDURE — 99203 OFFICE O/P NEW LOW 30 MIN: CPT | Mod: 25 | Performed by: NURSE PRACTITIONER

## 2022-08-03 PROCEDURE — 88305 TISSUE EXAM BY PATHOLOGIST: CPT | Performed by: DERMATOLOGY

## 2022-08-03 PROCEDURE — 11102 TANGNTL BX SKIN SINGLE LES: CPT | Performed by: NURSE PRACTITIONER

## 2022-08-03 PROCEDURE — 88341 IMHCHEM/IMCYTCHM EA ADD ANTB: CPT | Performed by: DERMATOLOGY

## 2022-08-03 PROCEDURE — 88342 IMHCHEM/IMCYTCHM 1ST ANTB: CPT | Performed by: DERMATOLOGY

## 2022-08-03 ASSESSMENT — PAIN SCALES - GENERAL: PAINLEVEL: NO PAIN (0)

## 2022-08-03 NOTE — LETTER
8/3/2022         RE: Amita Finley  [address]  [place] [address]        Dear Colleague,    Thank you for referring your patient, Amita Finley, to the [facility]. Please see a copy of my visit note below.    [facility] Dermatology Note  Encounter Date: Aug 3, 2022  Office Visit     Reviewed patients past medical history and pertinent chart review prior to patients visit today.     Dermatology Problem List:  NUB left dorsal hand shave biopsy 08/03/22     ____________________________________________    Assessment & Plan:     # Neoplasm of uncertain behavior:  Left dorsal hand  DDx includes melanoma. Shave biopsy today.    Procedure Note: Biopsy by shave technique  The risks and benefits of the procedure were described to the patient. These include but are not limited to bleeding, infection, scar, incomplete removal, and non-diagnostic biopsy. Verbal informed consent was obtained. The above site(s) was cleansed with an alcohol pad and injected with 1% lidocaine with epinephrine. Once anesthesia was obtained, a biopsy(ies) was performed with Gilette blade. The tissue(s) was placed in a labeled container(s) with formalin and sent to pathology. Hemostasis was achieved with aluminum chloride. Vaseline and a bandage were applied to the wound(s). The patient tolerated the procedure well and was given post biopsy care instructions.     # Cyst. Benign, no further treatment needed. Discussed excision if patient is bothered by the lesion due to irritation. Patient prefers to have the lesion removed and is aware of the risk of infection, scar, incomplete removal and recurrence. No treatment requested by patient.     # Benign skin findings including: seborrheic keratoses, cherry angioma, lentigines and benign nevi.   - No further intervention required. Patient to report changes.   - Patient reassured of the benign nature of these lesions.    #Signs and  Symptoms of non-melanoma skin cancer and ABCDEs of melanoma reviewed with patient. Patient encouraged to perform monthly self skin exams and educated on how to perform them. UV precautions reviewed with patient. Patient was asked about new or changing moles/lesions on body.     #Reviewed Sunscreen: Apply 20 minutes prior to going outdoors and reapply every two hours, when wet or sweating. We recommend using an SPF 30 or higher, and to use one that is water resistant.       Follow-up:  Pending biopsy results    Grazyna Sosa, APRN CNP on 8/3/2022 at 7:35 AM    ____________________________________________    CC: Skin Check (Lesion to top left hand/Cyst between breasts, grown in size over last week)    HPI:  Ms. Amita Finley is a(n) 56 year old female who presents today as a new patient for a full body skin cancer screening. Patient has concerns today about a spot on her left dorsal hand. It has been present about 1.5 years and growing. It has grown significantly in the past 6 months.  It is asymptomatic.  She has a lump between her breasts that has been present off and on for about 1 year and has grown In the past 1 week. It keeps coming back and feels like an acne cyst.    Patient is otherwise feeling well, without additional skin concerns.     Physical Exam:  Vitals: There were no vitals taken for this visit.  SKIN: Total skin excluding the genitalia areas was performed. The exam included the head/face, neck, both arms, chest, back, abdomen, both legs, digits, mons pubis, buttock and nails.   -left dorsal hand, 7 mm irregular shaped brown macule with patchy reticulation and one 1 mm area of darkened pigmentation off to an edge  -mid chest overlying sternum, 1 cm subcutaneous firm nodule with central punctum on dermoscopy  -several 1-2mm red dome shaped symmetric papules scattered on the trunk  -100+ tan/brown flat round macules and raised papules scattered throughout trunk, extremities and head. No  worrisome features for malignancy noted on examination.  -scattered tan, homogenous macules scattered on sun exposed areas of trunk, extremities and face.   -scattered waxy, stuck on tan/brown papules and patches on the trunk     - No other lesions of concern on areas examined.     Medications:  Current Outpatient Medications   Medication     busPIRone (BUSPAR) 5 MG tablet     hydrochlorothiazide (HYDRODIURIL) 25 MG tablet     levothyroxine (SYNTHROID/LEVOTHROID) 150 MCG tablet     LORazepam (ATIVAN) 0.5 MG tablet     predniSONE (DELTASONE) 5 MG tablet     spironolactone (ALDACTONE) 50 MG tablet     traZODone (DESYREL) 100 MG tablet     No current facility-administered medications for this visit.      Past Medical History:   Patient Active Problem List   Diagnosis     CARDIOVASCULAR SCREENING; LDL GOAL LESS THAN 160     Antiphospholipid antibody positive     Inflammatory arthritis     Back pain     Edema of both legs     Anxiety     Major depressive disorder, single episode, moderate (H)     Dry eye     Obesity (BMI 35.0-39.9) with comorbidity (H)     Hypothyroidism, unspecified type     Hyperlipidemia LDL goal <160     Rheumatoid arthritis involving both hands with negative rheumatoid factor (H)     Adenomatous polyp of colon - need colonoscopy 2024     Past Medical History:   Diagnosis Date     Antiphospholipid antibodies positive 12/14/2011     Arthritis      Baker's cyst of knee      Hypothyroidism      Obesity            CC Harika Michel MD  [address] [place] [address] on close of this encounter.      Again, thank you for allowing me to participate in the care of your patient.        Sincerely,        Grazyna Sosa, TRISH CNP

## 2022-08-03 NOTE — PROGRESS NOTES
[facility] Dermatology Note  Encounter Date: Aug 3, 2022  Office Visit     Reviewed patients past medical history and pertinent chart review prior to patients visit today.     Dermatology Problem List:  NUB left dorsal hand shave biopsy 08/03/22     ____________________________________________    Assessment & Plan:     # Neoplasm of uncertain behavior:  Left dorsal hand  DDx includes melanoma. Shave biopsy today.    Procedure Note: Biopsy by shave technique  The risks and benefits of the procedure were described to the patient. These include but are not limited to bleeding, infection, scar, incomplete removal, and non-diagnostic biopsy. Verbal informed consent was obtained. The above site(s) was cleansed with an alcohol pad and injected with 1% lidocaine with epinephrine. Once anesthesia was obtained, a biopsy(ies) was performed with Gilette blade. The tissue(s) was placed in a labeled container(s) with formalin and sent to pathology. Hemostasis was achieved with aluminum chloride. Vaseline and a bandage were applied to the wound(s). The patient tolerated the procedure well and was given post biopsy care instructions.     # Cyst. Benign, no further treatment needed. Discussed excision if patient is bothered by the lesion due to irritation. Patient prefers to have the lesion removed and is aware of the risk of infection, scar, incomplete removal and recurrence. No treatment requested by patient.     # Benign skin findings including: seborrheic keratoses, cherry angioma, lentigines and benign nevi.   - No further intervention required. Patient to report changes.   - Patient reassured of the benign nature of these lesions.    #Signs and Symptoms of non-melanoma skin cancer and ABCDEs of melanoma reviewed with patient. Patient encouraged to perform monthly self skin exams and educated on how to perform them. UV precautions reviewed with patient. Patient was asked about new or changing moles/lesions  on body.     #Reviewed Sunscreen: Apply 20 minutes prior to going outdoors and reapply every two hours, when wet or sweating. We recommend using an SPF 30 or higher, and to use one that is water resistant.       Follow-up:  Pending biopsy results    Grazyna Sosa, APRN CNP on 8/3/2022 at 7:35 AM    ____________________________________________    CC: Skin Check (Lesion to top left hand/Cyst between breasts, grown in size over last week)    HPI:  Ms. Amita Finley is a(n) 56 year old female who presents today as a new patient for a full body skin cancer screening. Patient has concerns today about a spot on her left dorsal hand. It has been present about 1.5 years and growing. It has grown significantly in the past 6 months.  It is asymptomatic.  She has a lump between her breasts that has been present off and on for about 1 year and has grown In the past 1 week. It keeps coming back and feels like an acne cyst.    Patient is otherwise feeling well, without additional skin concerns.     Physical Exam:  Vitals: There were no vitals taken for this visit.  SKIN: Total skin excluding the genitalia areas was performed. The exam included the head/face, neck, both arms, chest, back, abdomen, both legs, digits, mons pubis, buttock and nails.   -left dorsal hand, 7 mm irregular shaped brown macule with patchy reticulation and one 1 mm area of darkened pigmentation off to an edge  -mid chest overlying sternum, 1 cm subcutaneous firm nodule with central punctum on dermoscopy  -several 1-2mm red dome shaped symmetric papules scattered on the trunk  -100+ tan/brown flat round macules and raised papules scattered throughout trunk, extremities and head. No worrisome features for malignancy noted on examination.  -scattered tan, homogenous macules scattered on sun exposed areas of trunk, extremities and face.   -scattered waxy, stuck on tan/brown papules and patches on the trunk     - No other lesions of concern on areas  examined.     Medications:  Current Outpatient Medications   Medication     busPIRone (BUSPAR) 5 MG tablet     hydrochlorothiazide (HYDRODIURIL) 25 MG tablet     levothyroxine (SYNTHROID/LEVOTHROID) 150 MCG tablet     LORazepam (ATIVAN) 0.5 MG tablet     predniSONE (DELTASONE) 5 MG tablet     spironolactone (ALDACTONE) 50 MG tablet     traZODone (DESYREL) 100 MG tablet     No current facility-administered medications for this visit.      Past Medical History:   Patient Active Problem List   Diagnosis     CARDIOVASCULAR SCREENING; LDL GOAL LESS THAN 160     Antiphospholipid antibody positive     Inflammatory arthritis     Back pain     Edema of both legs     Anxiety     Major depressive disorder, single episode, moderate (H)     Dry eye     Obesity (BMI 35.0-39.9) with comorbidity (H)     Hypothyroidism, unspecified type     Hyperlipidemia LDL goal <160     Rheumatoid arthritis involving both hands with negative rheumatoid factor (H)     Adenomatous polyp of colon - need colonoscopy 2024     Past Medical History:   Diagnosis Date     Antiphospholipid antibodies positive 12/14/2011     Arthritis      Baker's cyst of knee      Hypothyroidism      Obesity            CC Harika Marianne Michel MD  [address] [place] [address] on close of this encounter.

## 2022-08-09 ENCOUNTER — E-VISIT (OUTPATIENT)
Dept: FAMILY MEDICINE | Facility: CLINIC | Age: 57
End: 2022-08-09
Payer: COMMERCIAL

## 2022-08-09 DIAGNOSIS — J01.90 ACUTE BACTERIAL SINUSITIS: Primary | ICD-10-CM

## 2022-08-09 DIAGNOSIS — B96.89 ACUTE BACTERIAL SINUSITIS: Primary | ICD-10-CM

## 2022-08-09 PROCEDURE — 99421 OL DIG E/M SVC 5-10 MIN: CPT | Performed by: PHYSICIAN ASSISTANT

## 2022-08-09 RX ORDER — DOXYCYCLINE HYCLATE 100 MG
100 TABLET ORAL 2 TIMES DAILY
Qty: 14 TABLET | Refills: 0 | Status: SHIPPED | OUTPATIENT
Start: 2022-08-09 | End: 2022-08-16

## 2022-08-10 ENCOUNTER — TELEPHONE (OUTPATIENT)
Dept: DERMATOLOGY | Facility: CLINIC | Age: 57
End: 2022-08-10

## 2022-08-10 LAB
PATH REPORT.COMMENTS IMP SPEC: ABNORMAL
PATH REPORT.COMMENTS IMP SPEC: YES
PATH REPORT.FINAL DX SPEC: ABNORMAL
PATH REPORT.GROSS SPEC: ABNORMAL
PATH REPORT.MICROSCOPIC SPEC OTHER STN: ABNORMAL
PATH REPORT.RELEVANT HX SPEC: ABNORMAL

## 2022-08-10 NOTE — PATIENT INSTRUCTIONS
Dear Amita Finley    After reviewing your responses, I've been able to diagnose you with?a sinus infection caused by bacteria.?     Based on your responses and diagnosis, I have prescribed doxycycline to treat your symptoms. I have sent this to your pharmacy.?     It is also important to stay well hydrated, get lots of rest and take over-the-counter decongestants,?tylenol?or ibuprofen if you?are able to?take those medications per your primary care provider to help relieve discomfort.?     It is important that you take?all of?your prescribed medication even if your symptoms are improving after a few doses.? Taking?all of?your medicine helps prevent the symptoms from returning.?     If your symptoms worsen, you develop severe headache, vomiting, high fever (>102), or are not improving in 7 days, please contact your primary care provider for an appointment or visit any of our convenient Walk-in Care or Urgent Care Centers to be seen which can be found on our website?here.?     Thanks again for choosing?us?as your health care partner,?   ?  Roger Frankel PA-C?

## 2022-08-10 NOTE — TELEPHONE ENCOUNTER
CAN THIS WAIT UNTIL MID October-  SEE BELOW    Thank you,    Teodora MORALESRN BSN  [facility] Dermatology- [phone]      Microscopic Description   UUMAYO   The specimen exhibits a poorly circumscribed atypical junctional melanocytic proliferation comprised of severely cytologically atypical melanocytes present focally in irregular nests but more prominently as single cells which demonstrate significant pagetoid scatter, confluence along the basal layer, and focal adnexal extension. Melan-A stained sections highlight melanocytes in a manner consistent with the above histologic impression. PRAME stained sections highlight nuclei of junctional melanocytes, confirming the histologic impression. The lesion appears narrowly excised in the planes examined.          Grazyna Sosa, TRISH JARQUIN  P  Dermatology Patient Care Pool  I spoke with patient and results were given.  Discussed regular 3 to 6-month skin exams for the next 2 years.  Discussed self skin exams monthly.  Discussed regular and diligent use of SPF and sun protection.  Discussed that first-line relatives should have yearly skin exams, her brother also had melanoma.     Please call patient to schedule Mohs removal of melanoma in situ on the hand and a 3 to 6-month follow-up per patient preference.

## 2022-08-11 ENCOUNTER — TELEPHONE (OUTPATIENT)
Dept: DERMATOLOGY | Facility: CLINIC | Age: 57
End: 2022-08-11

## 2022-08-11 DIAGNOSIS — D03.8 MELANOMA IN SITU OF OTHER SITE (H): Primary | ICD-10-CM

## 2022-08-11 NOTE — TELEPHONE ENCOUNTER
No openings with Dr. Lora within the next 1-3 weeks per guideline.   Routing to [facility] and [facility] to see if there is any openings within this timeframe.     Please help schedule this patient within the next 1-3 weeks for melanoma in situ excision.    Gualberto ROYAL

## 2022-08-11 NOTE — TELEPHONE ENCOUNTER
Patient is going to the [facility].    Thank you,    Teodora MORALESRN BSN  [facility]- [phone]

## 2022-08-11 NOTE — TELEPHONE ENCOUNTER
referral placed.    Thank you,    Teodora MORALES,RN BSN  [facility] Dermatology- [phone]

## 2022-08-11 NOTE — TELEPHONE ENCOUNTER
[facility] is not up and running on MOHS for Melanoma's at our site.       [facility] please review and advise if you are able to see patient for MOHS on the hand melanoma     Rebecca Cooper LPN

## 2022-08-11 NOTE — TELEPHONE ENCOUNTER
----- Message from TRISH Amaya CNP sent at 8/10/2022  4:02 PM CDT -----  I spoke with patient and results were given.  Discussed regular 3 to 6-month skin exams for the next 2 years.  Discussed self skin exams monthly.  Discussed regular and diligent use of SPF and sun protection.  Discussed that first-line relatives should have yearly skin exams, her brother also had melanoma.    Please call patient to schedule Mohs removal of melanoma in situ on the hand and a 3 to 6-month follow-up per patient preference.

## 2022-08-12 ENCOUNTER — TELEPHONE (OUTPATIENT)
Dept: DERMATOLOGY | Facility: CLINIC | Age: 57
End: 2022-08-12

## 2022-08-12 NOTE — TELEPHONE ENCOUNTER
FUTURE VISIT INFORMATION      FUTURE VISIT INFORMATION:    Date: 8.15.22    Time: 2:45    Location: Telephone  REFERRAL INFORMATION:    Referring provider:  Ian    Referring providers clinic:  Derm    Reason for visit/diagnosis  L dorsal hand melanoma    RECORDS REQUESTED FROM:       Clinic name Comments Records Status Imaging Status   Derm 8.3.22  Path # [id number] [facility]

## 2022-08-12 NOTE — TELEPHONE ENCOUNTER
Spoke with patient regarding providers recommendation for regular 3 to 6 month skin exams. Patient agreed and exams scheduled on the following dates: 11/4/22, 3/3/23 and 8/4/23.     Gualberto ROYAL

## 2022-08-12 NOTE — TELEPHONE ENCOUNTER
Called patient and assisted with scheduling mohs consult and surgery with Dr. Garcia. Referral linked to surgery appointment.

## 2022-08-15 ENCOUNTER — PRE VISIT (OUTPATIENT)
Dept: DERMATOLOGY | Facility: CLINIC | Age: 57
End: 2022-08-15

## 2022-08-15 ENCOUNTER — VIRTUAL VISIT (OUTPATIENT)
Dept: DERMATOLOGY | Facility: CLINIC | Age: 57
End: 2022-08-15
Payer: COMMERCIAL

## 2022-08-15 DIAGNOSIS — D03.62 MELANOMA IN SITU OF LEFT UPPER EXTREMITY (H): Primary | ICD-10-CM

## 2022-08-15 PROCEDURE — 99203 OFFICE O/P NEW LOW 30 MIN: CPT | Mod: GC | Performed by: DERMATOLOGY

## 2022-08-15 ASSESSMENT — PAIN SCALES - GENERAL: PAINLEVEL: NO PAIN (0)

## 2022-08-15 NOTE — Clinical Note
8/15/2022       RE: Amita Finley  [address] [address]     Dear Colleague,    Thank you for referring your patient, Amita Finley, to the [facility] DERMATOLOGIC SURGERY CLINIC [place] [facility]. Please see a copy of my visit note below.    Mohs Micrographic Surgery Consult Note    Aug 15, 2022  Start time (if telephone encounter): {wmtimehour:617226}{wmtimeminute:301363}{wmtimeampm:213900}  End time (if telephone encounter): {wmtimehour:713187}{wmtimeminute:239852}{wmtimeampm:986036}    Dermatology Problem List:  ***    Subjective: The patient is a 56 year old {wmgender:986259} who presents today for Mohs micrographic surgery consultation for a recent diagnosis of skin cancer.    Skin cancer(s): {wmskincancertype:117623}  Location(s): ***  Associated symptoms: pruritus, tenderness to touch  Onset: within last 1 year    No other associated symptoms, modifying factors, or prior treatments, except when noted above. The patient denies any constitutional symptoms, lymphadenopathy, unintentional weight loss or decreased appetite. No other skin concerns today.    Objective:   Skin: Focused examination of the *** within the teledermatology photograph(s) on *** was performed.   - ***    Assessment and Plan:     1. Plan for Mohs micrographic surgery for skin cancer(s) above:  - We discussed the nature of the diagnosis/condition above. We discussed the treatment options, including the risks benefits and expectations of these options. We recommend micrographic surgery as the most effective and most tissue sparing option for treatment, and the patient agrees to proceed with this.  The patient is aware of the risks, benefits and expectations of this procedure. The patient will be scheduled for this procedure, if not already done so.  - We anticipate the following closure type: {wmclosuretype:831518}    The patient was discussed with  "and evaluated by attending physician, {[facility]:834182::\"Jeremy Garcia MD\"}.    Marbin Durbin MD  Dermatology, PGY-5  Mohs surgery fellow      Again, thank you for allowing me to participate in the care of your patient.      Sincerely,    Jeremy Garcia MD    " FLACC

## 2022-08-15 NOTE — PROGRESS NOTES
Mohs Micrographic Surgery Consult Note    Aug 15, 2022  Start time (if telephone encounter): 2:45 p.m.  End time (if telephone encounter): 3:00 p.m.    Dermatology Problem List:  1. Melanoma in situ, L dorsal hand, s/p biopsy on 8/3/22    Subjective: The patient is a 56 year old woman who presents today for Mohs micrographic surgery consultation for a recent diagnosis of skin cancer.    Skin cancer(s): Melanoma in situ  Location(s): L dorsal hand  Associated symptoms: pruritus, tenderness to touch  Onset: within last 1 year    No other associated symptoms, modifying factors, or prior treatments, except when noted above. The patient denies any constitutional symptoms, lymphadenopathy, unintentional weight loss or decreased appetite. No other skin concerns today.    Objective:   Skin: Focused examination of the L dorsal within the teledermatology photograph(s) on 8/3/22 was performed.   - Irregular pigmented macule on L dorsal hand    Assessment and Plan:     1. Plan for Mohs micrographic surgery for skin cancer(s) above:  - We discussed the nature of the diagnosis/condition above. We discussed the treatment options, including the risks benefits and expectations of these options. We recommend micrographic surgery as the most effective and most tissue sparing option for treatment, and the patient agrees to proceed with this.  The patient is aware of the risks, benefits and expectations of this procedure. The patient will be scheduled for this procedure, if not already done so.  - We anticipate the following closure type: Linear closure, such as complex linear closure (CLC) vs Graft    The patient was discussed with and evaluated by attending physician, Jeremy Garcia MD.    Marbin Durbin MD  Dermatology, PGY-5  Mohs surgery fellow    Attending Attestation  I attest that I discussed the case with the Fellow.  I agree with the plan.   I have reviewed the note and edited it as necessary.    Jeremy Garcia  M.D.  Professor  Director of Dermatologic Surgery  Department of Dermatology  [facility]

## 2022-08-25 ENCOUNTER — VIRTUAL VISIT (OUTPATIENT)
Dept: FAMILY MEDICINE | Facility: CLINIC | Age: 57
End: 2022-08-25
Payer: COMMERCIAL

## 2022-08-25 DIAGNOSIS — U07.1 INFECTION DUE TO 2019 NOVEL CORONAVIRUS: Primary | ICD-10-CM

## 2022-08-25 DIAGNOSIS — R06.02 SOB (SHORTNESS OF BREATH): ICD-10-CM

## 2022-08-25 PROCEDURE — 99213 OFFICE O/P EST LOW 20 MIN: CPT | Mod: 95 | Performed by: NURSE PRACTITIONER

## 2022-08-25 RX ORDER — BENZONATATE 100 MG/1
100 CAPSULE ORAL 3 TIMES DAILY PRN
Qty: 30 CAPSULE | Refills: 0 | Status: SHIPPED | OUTPATIENT
Start: 2022-08-25 | End: 2023-11-07

## 2022-08-25 RX ORDER — ALBUTEROL SULFATE 90 UG/1
2 AEROSOL, METERED RESPIRATORY (INHALATION) EVERY 4 HOURS PRN
Qty: 18 G | Refills: 1 | Status: SHIPPED | OUTPATIENT
Start: 2022-08-25 | End: 2023-11-07

## 2022-08-25 NOTE — PATIENT INSTRUCTIONS
"Discharge Instructions for COVID-19 Patients  You have--or may have--COVID-19. Please follow the instructions listed below.   If you have a weakened immune system, discuss with your doctor any other actions you need to take.  How can I protect others?  If you have symptoms (fever, cough, body aches or trouble breathing):  Stay home and away from others (self-isolate) until:  Your other symptoms have resolved (gotten better). And   You've had no fever--and no medicine that reduces fever--for 1 full day (24 hours). And   At least 10 days have passed since your symptoms started. (You may need to wait 20 days. Follow the advice of your care team.)  If you don't show symptoms, but testing showed that you have COVID-19:  Stay home and away from others (self-isolate) until at least 10 days have passed since the date of your first positive COVID-19 test.  During this time  Stay in your own room, even for meals. Use your own bathroom if you can.  Stay away from others in your home. No hugging, kissing or shaking hands. No visitors.  Don't go to work, school or anywhere else.  Clean \"high touch\" surfaces often (doorknobs, counters, handles). Use household cleaning spray or wipes.  You'll find a full list of  on the EPA website: www.epa.gov/pesticide-registration/list-n-disinfectants-use-against-sars-cov-2.  Cover your mouth and nose with a mask or other face covering to avoid spreading germs.  Wash your hands and face often. Use soap and water.  Caregivers in these groups are at risk for severe illness due to COVID-19:  People 65 years and older  People who live in a nursing home or long-term care facility  People with chronic disease (lung, heart, cancer, diabetes, kidney, liver, immunologic)  People who have a weakened immune system, including those who:  Are in cancer treatment  Take medicine that weakens the immune system, such as corticosteroids  Had a bone marrow or organ transplant  Have an immune " deficiency  Have poorly controlled HIV or AIDS  Are obese (body mass index of 40 or higher)  Smoke regularly  Caregivers should wear gloves while washing dishes, handling laundry and cleaning bedrooms and bathrooms.  Use caution when washing and drying laundry: Don't shake dirty laundry and use the warmest water setting that you can.  For more tips on managing your health at home, go to www.cdc.gov/coronavirus/2019-ncov/downloads/10Things.pdf.  How can I take care of myself at home?  Get lots of rest. Drink extra fluids (unless a doctor has told you not to).  Take Tylenol (acetaminophen) for fever or pain. If you have liver or kidney problems, ask your family doctor if it's okay to take Tylenol.   Adults can take either:   650 mg (two 325 mg pills) every 4 to 6 hours, or   1,000 mg (two 500 mg pills) every 8 hours as needed.  Note: Don't take more than 3,000 mg in one day. Acetaminophen is found in many medicines (both prescribed and over-the-counter medicines). Read all labels to be sure you don't take too much.   For children, check the Tylenol bottle for the right dose. The dose is based on the child's age or weight.  If you have other health problems (like cancer, heart failure, an organ transplant or severe kidney disease): Call your specialty clinic if you don't feel better in the next 2 days.  Know when to call 911. Emergency warning signs include:  Trouble breathing or shortness of breath  Pain or pressure in the chest that doesn't go away  Feeling confused like you haven't felt before, or not being able to wake up  Bluish-colored lips or face  Your doctor may have prescribed a blood thinner medicine. Follow their instructions.  Where can I get more information?   [employer] [place] - About COVID-19:   [email or web address]  CDC - What to Do If You're Sick: www.cdc.gov/coronavirus/2019-ncov/about/steps-when-sick.html  CDC - Ending Home Isolation:  www.cdc.gov/coronavirus/2019-ncov/hcp/disposition-in-home-patients.html  CDC - Caring for Someone: www.cdc.gov/coronavirus/2019-ncov/if-you-are-sick/care-for-someone.html  [facility] - Interim Guidance for Hospital Discharge to Home: www.health.[facility].[place]/diseases/coronavirus/hcp/hospdischarge.pdf  Below are the COVID-19 hotlines at the [place] Department of Health ([facility]). Interpreters are available.  For health questions: Call 203-860-4956 or 1-265.205.1512 (7 a.m. to 7 p.m.)  For questions about schools and childcare: Call 489-458-3902 or 1-829.524.9520 (7 a.m. to 7 p.m.)    For informational purposes only. Not to replace the advice of your health care provider. Clinically reviewed by Dr. Wyatt Woodard.   Copyright   2020 [facility]. All rights reserved. [employer] 926253 - REV 01/05/21.

## 2022-08-25 NOTE — PROGRESS NOTES
Brynn is a 56 year old who is being evaluated via a billable video visit.      How would you like to obtain your AVS? MyChart  If the video visit is dropped, the invitation should be resent by: Text to cell phone: [phone]  Will anyone else be joining your video visit? No          Assessment & Plan     Infection due to 2019 novel coronavirus  Symptoms started yesterday, meets all requirements. Will NOT take ativan while taking this, will also cut trazodone in half if she needs it. Aware of side effects and to monitor for risk of allergic reaction.   - nirmatrelvir and ritonavir (PAXLOVID) therapy pack; Take 3 tablets by mouth 2 times daily for 5 days (Take 2 Nirmatrelvir tablets and 1 Ritonavir tablet twice daily for 5 days)  - benzonatate (TESSALON) 100 MG capsule; Take 1 capsule (100 mg) by mouth 3 times daily as needed for cough  - albuterol (PROAIR HFA/PROVENTIL HFA/VENTOLIN HFA) 108 (90 Base) MCG/ACT inhaler; Inhale 2 puffs into the lungs every 4 hours as needed for shortness of breath / dyspnea or wheezing    SOB (shortness of breath)  Will send in case she needs  - albuterol (PROAIR HFA/PROVENTIL HFA/VENTOLIN HFA) 108 (90 Base) MCG/ACT inhaler; Inhale 2 puffs into the lungs every 4 hours as needed for shortness of breath / dyspnea or wheezing      Return in about 1 week (around 9/1/2022), or if symptoms worsen or fail to improve.    TRISH Jay, NP-C  [facility]   Brynn is a 56 year old accompanied by her self, presenting for the following health issues:  Covid Concern      HPI       COVID-19 Symptom Review  How many days ago did these symptoms start? yesterday    Are any of the following symptoms significant for you?    New or worsening difficulty breathing? No    Worsening cough? Yes, it's a dry cough.     Fever or chills? Yes, the highest temperature was 101.0    Headache: YES    Sore throat: YES    Chest pain: No    Diarrhea: No    Body aches? YES    What  treatments has patient tried? Guaifenesin (mucinex), Acetaminophen and ibu   Does patient live in a nursing home, group home, or shelter? No  Does patient have a way to get food/medications during quarantined? Yes, I have a friend or family member who can help me.          Ativan is rare use, none in the past 3 months  Trazodone: using prn, not using in the past few days.       Review of Systems   Constitutional, HEENT, cardiovascular, pulmonary, gi and gu systems are negative, except as otherwise noted.      Objective    Vitals - Patient Reported  Temperature (Patient Reported): 101  F (38.3  C)      Vitals:  No vitals were obtained today due to virtual visit.    Physical Exam   GENERAL: Healthy, alert and no distress  EYES: Eyes grossly normal to inspection.  No discharge or erythema, or obvious scleral/conjunctival abnormalities.  RESP: No audible wheeze, cough, or visible cyanosis.  No visible retractions or increased work of breathing.    SKIN: Visible skin clear. No significant rash, abnormal pigmentation or lesions.  NEURO: Cranial nerves grossly intact.  Mentation and speech appropriate for age.  PSYCH: Mentation appears normal, affect normal/bright, judgement and insight intact, normal speech and appearance well-groomed.    Reviewed normal renal results from June 2022            Video-Visit Details    Video Start Time: 2:22 PM    Type of service:  Video Visit    Video End Time:2:31 PM    Originating Location (pt. Location): Home    Distant Location (provider location):  Home secure location    Platform used for Video Visit: April Morocho.

## 2022-09-14 ENCOUNTER — OFFICE VISIT (OUTPATIENT)
Dept: DERMATOLOGY | Facility: CLINIC | Age: 57
End: 2022-09-14
Attending: NURSE PRACTITIONER

## 2022-09-14 DIAGNOSIS — D03.62 MELANOMA IN SITU OF LEFT UPPER EXTREMITY (H): ICD-10-CM

## 2022-09-14 DIAGNOSIS — D03.8 MELANOMA IN SITU OF OTHER SITE (H): ICD-10-CM

## 2022-09-14 PROCEDURE — 14041 TIS TRNFR F/C/C/M/N/A/G/H/F: CPT | Performed by: DERMATOLOGY

## 2022-09-14 PROCEDURE — 88305 TISSUE EXAM BY PATHOLOGIST: CPT | Mod: 26 | Performed by: DERMATOLOGY

## 2022-09-14 PROCEDURE — 17311 MOHS 1 STAGE H/N/HF/G: CPT | Performed by: DERMATOLOGY

## 2022-09-14 PROCEDURE — 88305 TISSUE EXAM BY PATHOLOGIST: CPT | Mod: TC | Performed by: DERMATOLOGY

## 2022-09-14 NOTE — PATIENT INSTRUCTIONS

## 2022-09-14 NOTE — LETTER
9/14/2022       RE: Amita Finley  [address] [address]     Dear Colleague,    Thank you for referring your patient, Amita Finley, to the [facility] DERMATOLOGIC SURGERY CLINIC [place] at [facility]. Please see a copy of my visit note below.    [facility] Mohs Surgery Procedure Note    Case #: 1  Date of Service:  Sep 14, 2022  Surgery: Mohs micrographic surgery (MMS)  Staff surgeon: Jeremy Garcia MD  Fellow surgeon: None  Resident surgeon: None  Nurse: Nicole Seymour CMA    Tumor Type: Melanoma in situ  Location: Left dorsal hand  Derm-Path Accession #: [id number]    Mohs Accession #: [id number]  Pre-Op Size: 1.1 cm x 0.8 cm  Final Defect Size: 2.0 cm x 2.5 cm  Number of Mohs stages: 1  Level of Defect: Fascia  Local anesthetic: 15 mL 1% lidocaine with epinephrine  Repair Type: Transposition flap  Repair Size: 4.0 cm x 3.5 cm  Suture Material: 4-0 Monocryl; 5-0 fast absorbing gut    Procedure:    Stage I  We discussed the principles of treatment and most likely complications including scarring, bleeding, infection, swelling, pain, crusting, nerve damage, large wound,  incomplete excision, wound dehiscence,  nerve damage, recurrence, and a second procedure may be recommended to obtain the best cosmetic or functional result.    Informed consent was obtained and the patient underwent the procedure as follows:  The patient was placed supine on the operating table.  The cancer was identified, outlined with a marker, and verified by the patient.  The entire surgical field was prepped with chlorhexidine.  The surgical site was anesthetized using lidocaine with epinephrine.    The area of clinically apparent tumor was debulked. The layer of tissue was then surgically excised using a #15 blade and was then transferred onto a specimen sheet maintaining the orientation of the specimen. Hemostasis was obtained using  electrocoagulation. The wound site was then covered with a dressing while the tissue samples were processed for examination.    The excised tissue was transported to the Mohs histology laboratory maintaining the tissue orientation.  The tissue specimen was relaxed so that the entire surgical margin was in a a single horizontal plane for sectioning and inked for precise mapping.  A precise reference map was drawn to reflect the sectioning of the specimen, colored inking of the margins, and orientation on the patient.  The tissue was processed using horizontal sectioning of the base and continuous peripheral margins.  The histopathologic sections were reviewed in conjunction with the reference map.    Total blocks: 2  Total slides: 10    There were no cancer cells visualized on examination, therefore Mohs surgery was complete.    PROCEDURE: Rhombic Transposition Flap    The patient was taken to the operative suite and placed supine on the operating room table.  The wound was identified and infiltrated with lidocaine with epinephrine.  The defect was then cleansed and prepped with chlorhexidine and draped with sterile drapes.  Using a marker, a rhomboid transposition flap was planned.  The wound edges were then debeveled and the wound was undermined bluntly in all directions.  The transposition flap was incised sharply to the level of fat.  The flap was undermined from all surrounding tissue. Hemostasis was obtained with electrocoagulation.  The flap was transposed into the primary defect. The secondary defect was closed with deep dermal 4-0 Monocryl sutures. Epidermal tissue was carefully approximated using 5-0 fast absorbing gut sutures in a simple running fashion throughout the length of the flap.  Any redundant skin was excised by the triangulation technique, and closed in similar fashion.  The wound was cleansed with sterile saline and Vaseline was applied. A sterile non-adherent pressure dressing was placed.  The  patient left the operating suite in stable condition. Wound care was reviewed verbally and in writing.    Follow-up for suture removal: Not applicable as only dissolving sutures used.    Dr. Jeremy Garcia performed the Mohs micrographic surgery and reconstruction.    Scribe Disclosure:  I, Dean Glass, am serving as a scribe to document services personally performed by Jeremy Garcia MD based on data collection and the provider's statements to me.     Attending attestation:  I personally performed the entire procedure.  I have reviewed the note and edited it as necessary, and agree with its contents.    Jeremy Garcia M.D.  Professor  Director of Dermatologic Surgery  Department of Dermatology  [facility]    Dermatology Surgery Clinic  [facility] [place] [id number]

## 2022-09-14 NOTE — NURSING NOTE
Chief Complaint   Patient presents with     Derm Problem     Patient is here today for mohs on left hand.      Nicole GAUTAM CMA

## 2022-09-15 ENCOUNTER — MYC MEDICAL ADVICE (OUTPATIENT)
Dept: DERMATOLOGY | Facility: CLINIC | Age: 57
End: 2022-09-15

## 2022-09-15 NOTE — PROGRESS NOTES
[facility] Mohs Surgery Procedure Note    Case #: 1  Date of Service:  Sep 14, 2022  Surgery: Mohs micrographic surgery (MMS)  Staff surgeon: Jeremy Garcia MD  Fellow surgeon: None  Resident surgeon: None  Nurse: Nicole Seymour CMA    Tumor Type: Melanoma in situ  Location: Left dorsal hand  Derm-Path Accession #: [id number]    Mohs Accession #: [id number]  Pre-Op Size: 1.1 cm x 0.8 cm  Final Defect Size: 2.0 cm x 2.5 cm  Number of Mohs stages: 1  Level of Defect: Fascia  Local anesthetic: 15 mL 1% lidocaine with epinephrine  Repair Type: Transposition flap  Repair Size: 4.0 cm x 3.5 cm  Suture Material: 4-0 Monocryl; 5-0 fast absorbing gut    Procedure:    Stage I  We discussed the principles of treatment and most likely complications including scarring, bleeding, infection, swelling, pain, crusting, nerve damage, large wound,  incomplete excision, wound dehiscence,  nerve damage, recurrence, and a second procedure may be recommended to obtain the best cosmetic or functional result.    Informed consent was obtained and the patient underwent the procedure as follows:  The patient was placed supine on the operating table.  The cancer was identified, outlined with a marker, and verified by the patient.  The entire surgical field was prepped with chlorhexidine.  The surgical site was anesthetized using lidocaine with epinephrine.    The area of clinically apparent tumor was debulked. The layer of tissue was then surgically excised using a #15 blade and was then transferred onto a specimen sheet maintaining the orientation of the specimen. Hemostasis was obtained using electrocoagulation. The wound site was then covered with a dressing while the tissue samples were processed for examination.    The excised tissue was transported to the [facility] histology laboratory maintaining the tissue orientation.  The tissue specimen was relaxed so that the entire surgical margin was in a a single horizontal plane for  sectioning and inked for precise mapping.  A precise reference map was drawn to reflect the sectioning of the specimen, colored inking of the margins, and orientation on the patient.  The tissue was processed using horizontal sectioning of the base and continuous peripheral margins.  The histopathologic sections were reviewed in conjunction with the reference map.    Total blocks: 2  Total slides: 10    There were no cancer cells visualized on examination, therefore Mohs surgery was complete.    PROCEDURE: Rhombic Transposition Flap    The patient was taken to the operative suite and placed supine on the operating room table.  The wound was identified and infiltrated with lidocaine with epinephrine.  The defect was then cleansed and prepped with chlorhexidine and draped with sterile drapes.  Using a marker, a rhomboid transposition flap was planned.  The wound edges were then debeveled and the wound was undermined bluntly in all directions.  The transposition flap was incised sharply to the level of fat.  The flap was undermined from all surrounding tissue. Hemostasis was obtained with electrocoagulation.  The flap was transposed into the primary defect. The secondary defect was closed with deep dermal 4-0 Monocryl sutures. Epidermal tissue was carefully approximated using 5-0 fast absorbing gut sutures in a simple running fashion throughout the length of the flap.  Any redundant skin was excised by the triangulation technique, and closed in similar fashion.  The wound was cleansed with sterile saline and Vaseline was applied. A sterile non-adherent pressure dressing was placed.  The patient left the operating suite in stable condition. Wound care was reviewed verbally and in writing.    Follow-up for suture removal: Not applicable as only dissolving sutures used.    Dr. Jeremy Garcia performed the Mohs micrographic surgery and reconstruction.    Scribe Disclosure:  I, Dean Glass, am serving as a scribe to document  services personally performed by Jeremy Garcia MD based on data collection and the provider's statements to me.     Attending attestation:  I personally performed the entire procedure.  I have reviewed the note and edited it as necessary, and agree with its contents.    Jeremy Garcia M.D.  Professor  Director of Dermatologic Surgery  Department of Dermatology  [facility]    Dermatology Surgery Clinic  [facility] [place] [id number]

## 2022-09-16 LAB
PATH REPORT.COMMENTS IMP SPEC: NORMAL
PATH REPORT.COMMENTS IMP SPEC: NORMAL
PATH REPORT.FINAL DX SPEC: NORMAL
PATH REPORT.GROSS SPEC: NORMAL
PATH REPORT.MICROSCOPIC SPEC OTHER STN: NORMAL
PATH REPORT.RELEVANT HX SPEC: NORMAL

## 2022-11-03 DIAGNOSIS — R60.0 EDEMA OF BOTH LEGS: ICD-10-CM

## 2022-11-03 NOTE — TELEPHONE ENCOUNTER
Pending Prescriptions:                       Disp   Refills    hydrochlorothiazide (HYDRODIURIL) 25 MG ta*90 tab*0        Sig: TAKE ONE TABLET BY MOUTH ONCE DAILY  Routing refill request to provider for review/approval because:  Routing to provider for approval

## 2022-11-04 RX ORDER — HYDROCHLOROTHIAZIDE 25 MG/1
TABLET ORAL
Qty: 90 TABLET | Refills: 0 | Status: SHIPPED | OUTPATIENT
Start: 2022-11-04 | End: 2023-02-16

## 2022-11-20 ENCOUNTER — E-VISIT (OUTPATIENT)
Dept: INTERNAL MEDICINE | Facility: CLINIC | Age: 57
End: 2022-11-20
Payer: COMMERCIAL

## 2022-11-20 ENCOUNTER — HEALTH MAINTENANCE LETTER (OUTPATIENT)
Age: 57
End: 2022-11-20

## 2022-11-20 DIAGNOSIS — E66.9 OBESITY, UNSPECIFIED CLASSIFICATION, UNSPECIFIED OBESITY TYPE, UNSPECIFIED WHETHER SERIOUS COMORBIDITY PRESENT: Primary | ICD-10-CM

## 2022-11-20 PROCEDURE — 99421 OL DIG E/M SVC 5-10 MIN: CPT | Mod: 24 | Performed by: INTERNAL MEDICINE

## 2023-01-03 ENCOUNTER — TELEPHONE (OUTPATIENT)
Dept: INTERNAL MEDICINE | Facility: CLINIC | Age: 58
End: 2023-01-03

## 2023-01-03 NOTE — TELEPHONE ENCOUNTER
Patient Quality Outreach    Patient is due for the following:   Depression  -  Depression follow-up visit    Next Steps:   Schedule a office visit for Depression follow up.    Type of outreach:    Phone, left message for patient/parent to call back.    Next Steps:  Reach out within 90 days via Letter.    Max number of attempts reached: No. Will try again in 90 days if patient still on fail list.    Questions for provider review:    None     Catie Martinez

## 2023-01-09 DIAGNOSIS — E03.9 HYPOTHYROIDISM, UNSPECIFIED TYPE: ICD-10-CM

## 2023-01-09 RX ORDER — LEVOTHYROXINE SODIUM 150 UG/1
TABLET ORAL
Qty: 115 TABLET | Refills: 1 | Status: SHIPPED | OUTPATIENT
Start: 2023-01-09 | End: 2023-07-18

## 2023-02-15 DIAGNOSIS — R60.0 EDEMA OF BOTH LEGS: ICD-10-CM

## 2023-02-16 RX ORDER — HYDROCHLOROTHIAZIDE 25 MG/1
TABLET ORAL
Qty: 90 TABLET | Refills: 0 | Status: SHIPPED | OUTPATIENT
Start: 2023-02-16 | End: 2023-06-05

## 2023-03-20 DIAGNOSIS — R60.0 EDEMA OF BOTH LEGS: ICD-10-CM

## 2023-03-22 RX ORDER — SPIRONOLACTONE 50 MG/1
TABLET, FILM COATED ORAL
Qty: 180 TABLET | Refills: 1 | Status: SHIPPED | OUTPATIENT
Start: 2023-03-22 | End: 2023-07-18

## 2023-03-22 NOTE — TELEPHONE ENCOUNTER
"Prescription approved per [facility] Refill Protocol.    Due for next appointment in June 2023    Requested Prescriptions   Pending Prescriptions Disp Refills     spironolactone (ALDACTONE) 50 MG tablet [Pharmacy Med Name: SPIRONOLACTONE 50MG TABS] 180 tablet 1     Sig: TAKE TWO TABLETS BY MOUTH ONCE DAILY       Diuretics (Including Combos) Protocol Passed - 3/20/2023  7:00 AM        Passed - Blood pressure under 140/90 in past 12 months     BP Readings from Last 3 Encounters:   06/06/22 114/75   03/12/21 124/78   12/06/19 113/72                 Passed - Recent (12 mo) or future (30 days) visit within the authorizing provider's specialty     Patient has had an office visit with the authorizing provider or a provider within the authorizing providers department within the previous 12 mos or has a future within next 30 days. See \"Patient Info\" tab in inbasket, or \"Choose Columns\" in Meds & Orders section of the refill encounter.              Passed - Medication is active on med list        Passed - Patient is age 18 or older        Passed - No active pregancy on record        Passed - Normal serum creatinine on file in past 12 months     Recent Labs   Lab Test 06/06/22  0753   CR 0.92              Passed - Normal serum potassium on file in past 12 months     Recent Labs   Lab Test 06/06/22  0753   POTASSIUM 3.7                    Passed - Normal serum sodium on file in past 12 months     Recent Labs   Lab Test 06/06/22  0753                 Passed - No positive pregnancy test in past 12 months             "

## 2023-05-08 ENCOUNTER — PATIENT OUTREACH (OUTPATIENT)
Dept: CARE COORDINATION | Facility: CLINIC | Age: 58
End: 2023-05-08
Payer: COMMERCIAL

## 2023-05-22 ENCOUNTER — PATIENT OUTREACH (OUTPATIENT)
Dept: CARE COORDINATION | Facility: CLINIC | Age: 58
End: 2023-05-22
Payer: COMMERCIAL

## 2023-05-23 ENCOUNTER — PATIENT OUTREACH (OUTPATIENT)
Dept: CARE COORDINATION | Facility: CLINIC | Age: 58
End: 2023-05-23
Payer: COMMERCIAL

## 2023-06-03 DIAGNOSIS — R60.0 EDEMA OF BOTH LEGS: ICD-10-CM

## 2023-06-05 RX ORDER — HYDROCHLOROTHIAZIDE 25 MG/1
TABLET ORAL
Qty: 90 TABLET | Refills: 0 | Status: SHIPPED | OUTPATIENT
Start: 2023-06-05 | End: 2023-07-18

## 2023-06-05 NOTE — TELEPHONE ENCOUNTER
Pending Prescriptions:                       Disp   Refills    hydrochlorothiazide (HYDRODIURIL) 25 MG t*90 tab*0            Sig: TAKE ONE TABLET BY MOUTH ONCE DAILY    Medication is being filled for 1 time refill only due to:  pt has a future appt scheduled.

## 2023-06-28 DIAGNOSIS — F51.01 PRIMARY INSOMNIA: ICD-10-CM

## 2023-06-28 RX ORDER — TRAZODONE HYDROCHLORIDE 100 MG/1
TABLET ORAL
Qty: 180 TABLET | Refills: 0 | Status: SHIPPED | OUTPATIENT
Start: 2023-06-28 | End: 2023-07-18

## 2023-06-28 NOTE — TELEPHONE ENCOUNTER
Prescription approved per [facility] Refill Protocol.    Jamar Gómez, Triage RN [facility]  3:19 PM 6/28/2023

## 2023-07-08 ENCOUNTER — HEALTH MAINTENANCE LETTER (OUTPATIENT)
Age: 58
End: 2023-07-08

## 2023-07-18 ENCOUNTER — OFFICE VISIT (OUTPATIENT)
Dept: INTERNAL MEDICINE | Facility: CLINIC | Age: 58
End: 2023-07-18
Payer: COMMERCIAL

## 2023-07-18 VITALS
WEIGHT: 255.5 LBS | TEMPERATURE: 98.6 F | OXYGEN SATURATION: 95 % | SYSTOLIC BLOOD PRESSURE: 122 MMHG | HEART RATE: 100 BPM | RESPIRATION RATE: 18 BRPM | DIASTOLIC BLOOD PRESSURE: 73 MMHG | BODY MASS INDEX: 41.06 KG/M2 | HEIGHT: 66 IN

## 2023-07-18 DIAGNOSIS — R60.0 EDEMA OF BOTH LEGS: ICD-10-CM

## 2023-07-18 DIAGNOSIS — E03.9 HYPOTHYROIDISM, UNSPECIFIED TYPE: ICD-10-CM

## 2023-07-18 DIAGNOSIS — F51.01 PRIMARY INSOMNIA: ICD-10-CM

## 2023-07-18 DIAGNOSIS — J01.90 ACUTE SINUSITIS WITH SYMPTOMS > 10 DAYS: ICD-10-CM

## 2023-07-18 DIAGNOSIS — F41.9 ANXIETY: ICD-10-CM

## 2023-07-18 DIAGNOSIS — E78.5 HYPERLIPIDEMIA LDL GOAL <160: ICD-10-CM

## 2023-07-18 DIAGNOSIS — Z12.31 ENCOUNTER FOR SCREENING MAMMOGRAM FOR BREAST CANCER: ICD-10-CM

## 2023-07-18 DIAGNOSIS — E66.3 OVERWEIGHT: Primary | ICD-10-CM

## 2023-07-18 LAB
ALBUMIN SERPL BCG-MCNC: 4.3 G/DL (ref 3.5–5.2)
ALP SERPL-CCNC: 61 U/L (ref 35–104)
ALT SERPL W P-5'-P-CCNC: 21 U/L (ref 0–50)
ANION GAP SERPL CALCULATED.3IONS-SCNC: 11 MMOL/L (ref 7–15)
AST SERPL W P-5'-P-CCNC: 20 U/L (ref 0–45)
BILIRUB SERPL-MCNC: 0.3 MG/DL
BUN SERPL-MCNC: 13.9 MG/DL (ref 6–20)
CALCIUM SERPL-MCNC: 9.4 MG/DL (ref 8.6–10)
CHLORIDE SERPL-SCNC: 106 MMOL/L (ref 98–107)
CHOLEST SERPL-MCNC: 233 MG/DL
CREAT SERPL-MCNC: 1.03 MG/DL (ref 0.51–0.95)
CREAT UR-MCNC: 103 MG/DL
DEPRECATED HCO3 PLAS-SCNC: 26 MMOL/L (ref 22–29)
ERYTHROCYTE [DISTWIDTH] IN BLOOD BY AUTOMATED COUNT: 13.1 % (ref 10–15)
GFR SERPL CREATININE-BSD FRML MDRD: 63 ML/MIN/1.73M2
GLUCOSE SERPL-MCNC: 104 MG/DL (ref 70–99)
HCT VFR BLD AUTO: 42.1 % (ref 35–47)
HDLC SERPL-MCNC: 49 MG/DL
HGB BLD-MCNC: 14.3 G/DL (ref 11.7–15.7)
LDLC SERPL CALC-MCNC: 157 MG/DL
MCH RBC QN AUTO: 28.4 PG (ref 26.5–33)
MCHC RBC AUTO-ENTMCNC: 34 G/DL (ref 31.5–36.5)
MCV RBC AUTO: 84 FL (ref 78–100)
MICROALBUMIN UR-MCNC: <12 MG/L
MICROALBUMIN/CREAT UR: NORMAL MG/G{CREAT}
NONHDLC SERPL-MCNC: 184 MG/DL
PLATELET # BLD AUTO: 246 10E3/UL (ref 150–450)
POTASSIUM SERPL-SCNC: 4 MMOL/L (ref 3.4–5.3)
PROT SERPL-MCNC: 7.1 G/DL (ref 6.4–8.3)
RBC # BLD AUTO: 5.04 10E6/UL (ref 3.8–5.2)
SODIUM SERPL-SCNC: 143 MMOL/L (ref 136–145)
TRIGL SERPL-MCNC: 135 MG/DL
TSH SERPL DL<=0.005 MIU/L-ACNC: 2.77 UIU/ML (ref 0.3–4.2)
WBC # BLD AUTO: 6.6 10E3/UL (ref 4–11)

## 2023-07-18 PROCEDURE — 85027 COMPLETE CBC AUTOMATED: CPT | Performed by: INTERNAL MEDICINE

## 2023-07-18 PROCEDURE — 99214 OFFICE O/P EST MOD 30 MIN: CPT | Performed by: INTERNAL MEDICINE

## 2023-07-18 PROCEDURE — 82043 UR ALBUMIN QUANTITATIVE: CPT | Performed by: INTERNAL MEDICINE

## 2023-07-18 PROCEDURE — 80061 LIPID PANEL: CPT | Performed by: INTERNAL MEDICINE

## 2023-07-18 PROCEDURE — 36415 COLL VENOUS BLD VENIPUNCTURE: CPT | Performed by: INTERNAL MEDICINE

## 2023-07-18 PROCEDURE — 80053 COMPREHEN METABOLIC PANEL: CPT | Performed by: INTERNAL MEDICINE

## 2023-07-18 PROCEDURE — 82570 ASSAY OF URINE CREATININE: CPT | Performed by: INTERNAL MEDICINE

## 2023-07-18 PROCEDURE — 84443 ASSAY THYROID STIM HORMONE: CPT | Performed by: INTERNAL MEDICINE

## 2023-07-18 RX ORDER — LEVOTHYROXINE SODIUM 150 UG/1
TABLET ORAL
Qty: 115 TABLET | Refills: 1 | Status: SHIPPED | OUTPATIENT
Start: 2023-07-18 | End: 2024-03-01

## 2023-07-18 RX ORDER — HYDROCHLOROTHIAZIDE 25 MG/1
25 TABLET ORAL DAILY
Qty: 90 TABLET | Refills: 1 | Status: SHIPPED | OUTPATIENT
Start: 2023-07-18 | End: 2024-03-01

## 2023-07-18 RX ORDER — SPIRONOLACTONE 50 MG/1
100 TABLET, FILM COATED ORAL DAILY
Qty: 180 TABLET | Refills: 1 | Status: SHIPPED | OUTPATIENT
Start: 2023-07-18 | End: 2023-11-07 | Stop reason: ALTCHOICE

## 2023-07-18 RX ORDER — BUSPIRONE HYDROCHLORIDE 5 MG/1
5 TABLET ORAL 3 TIMES DAILY
Qty: 270 TABLET | Refills: 3 | Status: SHIPPED | OUTPATIENT
Start: 2023-07-18 | End: 2023-11-07

## 2023-07-18 RX ORDER — TRAZODONE HYDROCHLORIDE 100 MG/1
TABLET ORAL
Qty: 180 TABLET | Refills: 1 | Status: SHIPPED | OUTPATIENT
Start: 2023-07-18 | End: 2024-03-01

## 2023-07-18 RX ORDER — DOXYCYCLINE 100 MG/1
100 CAPSULE ORAL 2 TIMES DAILY
Qty: 20 CAPSULE | Refills: 0 | Status: SHIPPED | OUTPATIENT
Start: 2023-07-18 | End: 2023-11-07

## 2023-07-18 ASSESSMENT — PATIENT HEALTH QUESTIONNAIRE - PHQ9: SUM OF ALL RESPONSES TO PHQ QUESTIONS 1-9: 8

## 2023-07-18 ASSESSMENT — PAIN SCALES - GENERAL: PAINLEVEL: NO PAIN (0)

## 2023-07-18 NOTE — PATIENT INSTRUCTIONS
Plan:  1.  Labs today - [address]   2. Mammogram ( please call [phone] to schedule it)   3. Wegovy - if insurance covers  4. Continue the other meds, same doses for now.  5. Weight clinic referral

## 2023-07-18 NOTE — PROGRESS NOTES
Dr Capellan's note      Patient's instructions / PLAN:                                                        Plan:  1.  Labs today - [address]   2. Mammogram ( please call [phone] to schedule it)   3. Wegovy - if insurance covers  4. Continue the other meds, same doses for now.  5. Weight clinic referral        ASSESSMENT & PLAN:                                                      (E66.3) Overweight  (primary encounter diagnosis)  Comment:   Plan: Semaglutide-Weight Management (WEGOVY) 0.25         MG/0.5ML pen, Adult Comprehensive Weight         Management  Referral            (E78.5) Hyperlipidemia LDL goal <160  Comment: Controlled based on prior numbers  Plan: Lipid panel reflex to direct LDL Fasting,         Comprehensive metabolic panel, CBC with         platelets, TSH with free T4 reflex, Albumin         Random Urine Quantitative with Creat Ratio            (E03.9) Hypothyroidism, unspecified type  Comment: Controlled based on prior numbers  Plan: levothyroxine (SYNTHROID/LEVOTHROID) 150 MCG         tablet, Lipid panel reflex to direct LDL         Fasting, Comprehensive metabolic panel, CBC         with platelets, TSH with free T4 reflex,         Albumin Random Urine Quantitative with Creat         Ratio            (F41.9) Anxiety  Comment: Controlled  Plan: busPIRone (BUSPAR) 5 MG tablet, Lipid panel         reflex to direct LDL Fasting, Comprehensive         metabolic panel, CBC with platelets, TSH with         free T4 reflex, Albumin Random Urine         Quantitative with Creat Ratio            (R60.0) Edema of both legs  Comment: Controlled  Plan: hydrochlorothiazide (HYDRODIURIL) 25 MG tablet,        spironolactone (ALDACTONE) 50 MG tablet, Lipid         panel reflex to direct LDL Fasting,         Comprehensive metabolic panel, CBC with         platelets, TSH with free T4 reflex, Albumin         Random Urine Quantitative with Creat Ratio            (F51.01) Primary insomnia  Comment:  Trazodone helps  Plan: traZODone (DESYREL) 100 MG tablet, Lipid panel         reflex to direct LDL Fasting, Comprehensive         metabolic panel, CBC with platelets, TSH with         free T4 reflex, Albumin Random Urine         Quantitative with Creat Ratio            (Z12.31) Encounter for screening mammogram for breast cancer  Comment:   Plan: MA Screening Digital Bilateral            (J01.90) Acute sinusitis with symptoms > 10 days  Comment:   Plan: doxycycline hyclate (VIBRAMYCIN) 100 MG capsule               Chief complaint:                                                      Follow up chronic medical problems       SUBJECTIVE:                                                    History of present illness:      R sinusitis   -- She has pain over the right sinus for more than 10 days.  She has allergy to amoxicillin  --Doxy Rx done.  She is aware she cannot go out in sun    Overweight  --She started Rybelsus treatment last year, but then she lost her father and she could not focus on her weight and the treatment  --She is interested in the weight loss clinic and [facility].  Rx done if it is covered by insurance.  Referral to the weight loss clinic done       Subjective   Brynn is a 57 year old, presenting for the following health issues:  Patient is being seen for an medication check.       No data to display      /Roomed by: Catie Martinez 07/18/2023         [facility]     Hypertension Follow-up      Do you check your blood pressure regularly outside of the clinic? No     Are you following a low salt diet? Yes    Are your blood pressures ever more than 140 on the top number (systolic) OR more   than 90 on the bottom number (diastolic), for example 140/90? No      How many servings of fruits and vegetables do you eat daily?  2-3    On average, how many sweetened beverages do you drink each day (Examples: soda, juice, sweet tea, etc.  Do NOT count diet or artificially sweetened beverages)?   0    How many days per week do you  "exercise enough to make your heart beat faster? 3 or less    How many minutes a day do you exercise enough to make your heart beat faster? 9 or less    How many days per week do you miss taking your medication? 0    Review of Systems:                                                      ROS: negative for fever, chills, cough, wheezes, chest pain, shortness of breath, vomiting, abdominal pain, leg swelling       OBJECTIVE:             Physical exam:  Blood pressure 122/73, pulse 100, temperature 98.6  F (37  C), temperature source Tympanic, resp. rate 18, height 1.67 m (5' 5.75\"), weight 115.9 kg (255 lb 8 oz), SpO2 95 %, not currently breastfeeding.     NAD, appears comfortable  Skin: no rashes   Neck: supple, no JVD,  No thyroidmegaly. Lymph nodes nonpalpable cervical and supraclavicular.  Chest: clear to auscultation bilaterally, good respiratory effort  Heart: S1 S2, RRR, no mgr appreciated  Abdomen: soft, not tender, no hepatosplenomegaly or masses appreciated, no abdominal bruit, present bowel sounds  Extremities: no edema,   Neurologic: A, Ox3, no focal signs appreciated    PMHx: reviewed  Past Medical History:   Diagnosis Date     Antiphospholipid antibodies positive 12/14/2011     Arthritis      Baker's cyst of knee      Hypothyroidism      Obesity       PSHx: reviewed  Past Surgical History:   Procedure Laterality Date     Novasure endometrial ablation  10/2007        Meds: reviewed  Current Outpatient Medications   Medication Sig Dispense Refill     albuterol (PROAIR HFA/PROVENTIL HFA/VENTOLIN HFA) 108 (90 Base) MCG/ACT inhaler Inhale 2 puffs into the lungs every 4 hours as needed for shortness of breath / dyspnea or wheezing 18 g 1     benzonatate (TESSALON) 100 MG capsule Take 1 capsule (100 mg) by mouth 3 times daily as needed for cough 30 capsule 0     busPIRone (BUSPAR) 5 MG tablet Take 1 tablet (5 mg) by mouth 3 times daily 270 tablet 3     hydrochlorothiazide (HYDRODIURIL) 25 MG tablet TAKE ONE " TABLET BY MOUTH ONCE DAILY 90 tablet 0     levothyroxine (SYNTHROID/LEVOTHROID) 150 MCG tablet TAKE TWO TABLETS BY MOUTH ON MONDAYS AND FRIDAYS, AND TAKE ONE TABLET BY MOUTH ALL OTHER DAYS. 115 tablet 1     LORazepam (ATIVAN) 0.5 MG tablet TAKE ONE-HALF TO ONE TABLET BY MOUTH EVERY 8 HOURS AS NEEDED FOR ANXIETY 20 tablet 0     predniSONE (DELTASONE) 5 MG tablet 4 tabs daily for 1 week, then 3 tabs daily for 1 week 50 tablet 0     Semaglutide 3 MG TABS Take 3 mg by mouth daily 30 tablet 3     spironolactone (ALDACTONE) 50 MG tablet TAKE TWO TABLETS BY MOUTH ONCE DAILY 180 tablet 1     traZODone (DESYREL) 100 MG tablet TAKE ONE TO TWO TABLETS BY MOUTH NIGHTLY AS NEEDED FOR SLEEP 180 tablet 0       Soc Hx: reviewed  Fam Hx: reviewed      Chart documentation was completed, in part, with [employer] voice-recognition software. Even though reviewed, some grammatical, spelling, and word errors may remain.      Harika Capellan MD  Internal Medicine

## 2023-07-19 ENCOUNTER — MYC MEDICAL ADVICE (OUTPATIENT)
Dept: INTERNAL MEDICINE | Facility: CLINIC | Age: 58
End: 2023-07-19
Payer: COMMERCIAL

## 2023-07-19 DIAGNOSIS — E78.5 HYPERLIPIDEMIA LDL GOAL <160: Primary | ICD-10-CM

## 2023-07-19 DIAGNOSIS — E66.9 OBESITY, UNSPECIFIED CLASSIFICATION, UNSPECIFIED OBESITY TYPE, UNSPECIFIED WHETHER SERIOUS COMORBIDITY PRESENT: Primary | ICD-10-CM

## 2023-07-19 RX ORDER — ROSUVASTATIN CALCIUM 5 MG/1
5 TABLET, COATED ORAL DAILY
Qty: 30 TABLET | Refills: 3 | Status: SHIPPED | OUTPATIENT
Start: 2023-07-19 | End: 2023-10-13

## 2023-08-04 ENCOUNTER — OFFICE VISIT (OUTPATIENT)
Dept: DERMATOLOGY | Facility: CLINIC | Age: 58
End: 2023-08-04
Payer: COMMERCIAL

## 2023-08-04 DIAGNOSIS — Z86.006 HISTORY OF MELANOMA IN SITU: ICD-10-CM

## 2023-08-04 DIAGNOSIS — L82.0 INFLAMED SEBORRHEIC KERATOSIS: ICD-10-CM

## 2023-08-04 DIAGNOSIS — Z12.83 ENCOUNTER FOR SCREENING FOR MALIGNANT NEOPLASM OF SKIN: ICD-10-CM

## 2023-08-04 DIAGNOSIS — L81.4 LENTIGINES: ICD-10-CM

## 2023-08-04 DIAGNOSIS — D48.9 NEOPLASM OF UNCERTAIN BEHAVIOR: ICD-10-CM

## 2023-08-04 DIAGNOSIS — D18.01 CHERRY ANGIOMA: ICD-10-CM

## 2023-08-04 DIAGNOSIS — D22.9 MULTIPLE BENIGN NEVI: Primary | ICD-10-CM

## 2023-08-04 DIAGNOSIS — L82.1 SEBORRHEIC KERATOSES: ICD-10-CM

## 2023-08-04 PROCEDURE — 99213 OFFICE O/P EST LOW 20 MIN: CPT | Mod: 25 | Performed by: NURSE PRACTITIONER

## 2023-08-04 PROCEDURE — 11102 TANGNTL BX SKIN SINGLE LES: CPT | Mod: XS | Performed by: NURSE PRACTITIONER

## 2023-08-04 PROCEDURE — 88305 TISSUE EXAM BY PATHOLOGIST: CPT | Performed by: PATHOLOGY

## 2023-08-04 PROCEDURE — 17110 DESTRUCTION B9 LES UP TO 14: CPT | Performed by: NURSE PRACTITIONER

## 2023-08-04 NOTE — PROGRESS NOTES
[facility] Dermatology Note  Encounter Date: Aug 4, 2023  Office Visit     Reviewed patients past medical history and pertinent chart review prior to patients visit today.     Dermatology Problem List:  NUB right upper arm, shave biopsy 08/04/23 .   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 Well healed scar without signs of recurrent malignancy.     # Neoplasm of uncertain behavior:  right upper arm  DDx includes prurigo nodule vs SCC. Shave biopsy today.    Procedure Note: Biopsy by shave technique  The risks and benefits of the procedure were described to the patient. These include but are not limited to bleeding, infection, scar, incomplete removal, and non-diagnostic biopsy. Verbal informed consent was obtained. The above site(s) was cleansed with an alcohol pad and injected with 1% lidocaine with epinephrine. Once anesthesia was obtained, a biopsy(ies) was performed with Gilette blade. The tissue(s) was placed in a labeled container(s) with formalin and sent to pathology. Hemostasis was achieved with aluminum chloride. Vaseline and a bandage were applied to the wound(s). The patient tolerated the procedure well and was given post biopsy care instructions.      # Irritated and inflamed Seborrheic Keratosis. Discussed treatment options with patient including no treatment, cryotherapy, and shave removal. Patient prefers cryotherapy today due to irritation and itching. After verbal consent and discussion of risks and benefits including but no limited to dyspigmentation/scar, blister, and pain, 1 was(were) treated with 1-2mm freeze border for 2 cycles with liquid nitrogen. Post cryotherapy instructions were provided.       # Benign skin findings including: seborrheic keratoses, cherry angioma, lentigines and benign  nevi.   - No further intervention required. Patient to report changes.   - Patient reassured of the benign nature of these lesions.    #Signs and Symptoms of non-melanoma skin cancer and ABCDEs of melanoma reviewed with patient. Patient encouraged to perform monthly self skin exams and educated on how to perform them. UV precautions reviewed with patient. Patient was asked about new or changing moles/lesions on body.     #Reviewed Sunscreen: Apply 20 minutes prior to going outdoors and reapply every two hours, when wet or sweating. We recommend using an SPF 30 or higher, and to use one that is water resistant.       Follow-up:  6 months for follow up full body skin exam, prn for new or changing lesions or new concerns    Jaimie Sosa CNP  Dermatology     ____________________________________________    CC: Skin Check (Melanoma on 2022 on left dorsal hand, lesion to right outer eye that developed quickly and changed size)    HPI:  Ms. Amita Finley is a(n) 57 year old female who presents today as a return patient for a full body skin cancer screening. Patient has concerns today about a brown spot by her right eye that is newer. It hasn't been changing.     Patient is otherwise feeling well, without additional skin concerns.     Physical Exam:  Vitals: LMP  (LMP Unknown)   SKIN: Total skin excluding the genitalia areas was performed. The exam included the head/face, neck, both arms, chest, back, abdomen, both legs, digits, mons pubis, buttock and nails.   - left dorsal hand, well healed scar without signs of repigmentation  - right lateral eyelid, brown irregular shaped slightly textured papule  -right upper arm, 4 mm pink crusted papule  -several 1-2mm red dome shaped symmetric papules scattered on the trunk  -multiple tan/brown flat round macules and raised papules scattered throughout trunk, extremities and head. No worrisome features for malignancy noted on examination.  -scattered tan, homogenous macules  scattered on sun exposed areas of trunk, extremities and face.   -scattered waxy, stuck on tan/brown papules and patches on the trunk     - No other lesions of concern on areas examined.     Medications:  Current Outpatient Medications   Medication    albuterol (PROAIR HFA/PROVENTIL HFA/VENTOLIN HFA) 108 (90 Base) MCG/ACT inhaler    benzonatate (TESSALON) 100 MG capsule    busPIRone (BUSPAR) 5 MG tablet    doxycycline hyclate (VIBRAMYCIN) 100 MG capsule    hydrochlorothiazide (HYDRODIURIL) 25 MG tablet    levothyroxine (SYNTHROID/LEVOTHROID) 150 MCG tablet    LORazepam (ATIVAN) 0.5 MG tablet    predniSONE (DELTASONE) 5 MG tablet    rosuvastatin (CRESTOR) 5 MG tablet    Semaglutide 3 MG TABS    Semaglutide-Weight Management (WEGOVY) 0.25 MG/0.5ML pen    Semaglutide-Weight Management (WEGOVY) 0.25 MG/0.5ML pen    Semaglutide-Weight Management (WEGOVY) 0.5 MG/0.5ML pen    spironolactone (ALDACTONE) 50 MG tablet    traZODone (DESYREL) 100 MG tablet     No current facility-administered medications for this visit.      Past Medical History:   Patient Active Problem List   Diagnosis    CARDIOVASCULAR SCREENING; LDL GOAL LESS THAN 160    Antiphospholipid antibody positive    Inflammatory arthritis    Edema of both legs    Anxiety    Major depressive disorder, single episode, moderate (H)    Obesity (BMI 35.0-39.9) with comorbidity (H)    Hypothyroidism, unspecified type    Hyperlipidemia LDL goal <160    Rheumatoid arthritis involving both hands with negative rheumatoid factor (H)    Adenomatous polyp of colon - need colonoscopy 2024     Past Medical History:   Diagnosis Date    Antiphospholipid antibodies positive 12/14/2011    Arthritis     Baker's cyst of knee     Hypothyroidism     Obesity        CC No referring provider defined for this encounter. on close of this encounter.

## 2023-08-04 NOTE — LETTER
8/4/2023         RE: Amita Finley  [address]  [place] [address]        Dear Colleague,    Thank you for referring your patient, Amita Finley, to the [facility]. Please see a copy of my visit note below.    [facility] Dermatology Note  Encounter Date: Aug 4, 2023  Office Visit     Reviewed patients past medical history and pertinent chart review prior to patients visit today.     Dermatology Problem List:  NUB right upper arm, shave biopsy 08/04/23 .   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 Well healed scar without signs of recurrent malignancy.     # Neoplasm of uncertain behavior:  right upper arm  DDx includes prurigo nodule vs SCC. Shave biopsy today.    Procedure Note: Biopsy by shave technique  The risks and benefits of the procedure were described to the patient. These include but are not limited to bleeding, infection, scar, incomplete removal, and non-diagnostic biopsy. Verbal informed consent was obtained. The above site(s) was cleansed with an alcohol pad and injected with 1% lidocaine with epinephrine. Once anesthesia was obtained, a biopsy(ies) was performed with Gilette blade. The tissue(s) was placed in a labeled container(s) with formalin and sent to pathology. Hemostasis was achieved with aluminum chloride. Vaseline and a bandage were applied to the wound(s). The patient tolerated the procedure well and was given post biopsy care instructions.      # Irritated and inflamed Seborrheic Keratosis. Discussed treatment options with patient including no treatment, cryotherapy, and shave removal. Patient prefers cryotherapy today due to irritation and itching. After verbal consent and discussion of risks and benefits including but no limited to  dyspigmentation/scar, blister, and pain, 1 was(were) treated with 1-2mm freeze border for 2 cycles with liquid nitrogen. Post cryotherapy instructions were provided.       # Benign skin findings including: seborrheic keratoses, cherry angioma, lentigines and benign nevi.   - No further intervention required. Patient to report changes.   - Patient reassured of the benign nature of these lesions.    #Signs and Symptoms of non-melanoma skin cancer and ABCDEs of melanoma reviewed with patient. Patient encouraged to perform monthly self skin exams and educated on how to perform them. UV precautions reviewed with patient. Patient was asked about new or changing moles/lesions on body.     #Reviewed Sunscreen: Apply 20 minutes prior to going outdoors and reapply every two hours, when wet or sweating. We recommend using an SPF 30 or higher, and to use one that is water resistant.       Follow-up:  6 months for follow up full body skin exam, prn for new or changing lesions or new concerns    Jaimie Sosa CNP  Dermatology     ____________________________________________    CC: Skin Check (Melanoma on 2022 on left dorsal hand, lesion to right outer eye that developed quickly and changed size)    HPI:  Ms. Amita Finley is a(n) 57 year old female who presents today as a return patient for a full body skin cancer screening. Patient has concerns today about a brown spot by her right eye that is newer. It hasn't been changing.     Patient is otherwise feeling well, without additional skin concerns.     Physical Exam:  Vitals: LMP  (LMP Unknown)   SKIN: Total skin excluding the genitalia areas was performed. The exam included the head/face, neck, both arms, chest, back, abdomen, both legs, digits, mons pubis, buttock and nails.   - left dorsal hand, well healed scar without signs of repigmentation  - right lateral eyelid, brown irregular shaped slightly textured papule  -right upper arm, 4 mm pink crusted papule  -several 1-2mm  red dome shaped symmetric papules scattered on the trunk  -multiple tan/brown flat round macules and raised papules scattered throughout trunk, extremities and head. No worrisome features for malignancy noted on examination.  -scattered tan, homogenous macules scattered on sun exposed areas of trunk, extremities and face.   -scattered waxy, stuck on tan/brown papules and patches on the trunk     - No other lesions of concern on areas examined.     Medications:  Current Outpatient Medications   Medication     albuterol (PROAIR HFA/PROVENTIL HFA/VENTOLIN HFA) 108 (90 Base) MCG/ACT inhaler     benzonatate (TESSALON) 100 MG capsule     busPIRone (BUSPAR) 5 MG tablet     doxycycline hyclate (VIBRAMYCIN) 100 MG capsule     hydrochlorothiazide (HYDRODIURIL) 25 MG tablet     levothyroxine (SYNTHROID/LEVOTHROID) 150 MCG tablet     LORazepam (ATIVAN) 0.5 MG tablet     predniSONE (DELTASONE) 5 MG tablet     rosuvastatin (CRESTOR) 5 MG tablet     Semaglutide 3 MG TABS     Semaglutide-Weight Management (WEGOVY) 0.25 MG/0.5ML pen     Semaglutide-Weight Management (WEGOVY) 0.25 MG/0.5ML pen     Semaglutide-Weight Management (WEGOVY) 0.5 MG/0.5ML pen     spironolactone (ALDACTONE) 50 MG tablet     traZODone (DESYREL) 100 MG tablet     No current facility-administered medications for this visit.      Past Medical History:   Patient Active Problem List   Diagnosis     CARDIOVASCULAR SCREENING; LDL GOAL LESS THAN 160     Antiphospholipid antibody positive     Inflammatory arthritis     Edema of both legs     Anxiety     Major depressive disorder, single episode, moderate (H)     Obesity (BMI 35.0-39.9) with comorbidity (H)     Hypothyroidism, unspecified type     Hyperlipidemia LDL goal <160     Rheumatoid arthritis involving both hands with negative rheumatoid factor (H)     Adenomatous polyp of colon - need colonoscopy 2024     Past Medical History:   Diagnosis Date     Antiphospholipid antibodies positive 12/14/2011     Arthritis       Baker's cyst of knee      Hypothyroidism      Obesity        CC No referring provider defined for this encounter. on close of this encounter.      Again, thank you for allowing me to participate in the care of your patient.        Sincerely,        TRISH Robison CNP

## 2023-08-04 NOTE — PATIENT INSTRUCTIONS
Wound Care After a Biopsy    What is a skin biopsy?  A skin biopsy allows the doctor to examine a very small piece of tissue under the microscope to determine the diagnosis and the best treatment for the skin condition. A local anesthetic (numbing medicine)  is injected with a very small needle into the skin area to be tested. A small piece of skin is taken from the area. Sometimes a suture (stitch) is used.     What are the risks of a skin biopsy?  I will experience scar, bleeding, swelling, pain, crusting and redness. I may experience incomplete removal or recurrence. Risks of this procedure are excessive bleeding, bruising, infection, nerve damage, numbness, thick (hypertrophic or keloidal) scar and non-diagnostic biopsy.    How should I care for my wound for the first 24 hours?  Keep the wound dry and covered for 24 hours  If it bleeds, hold direct pressure on the area for 15 minutes. If bleeding does not stop then go to the emergency room  Avoid strenuous exercise the first 1-2 days or as your doctor instructs you    How should I care for the wound after 24 hours?  After 24 hours, remove the bandage  You may bathe or shower as normal  If you had a scalp biopsy, you can shampoo as usual and can use shower water to clean the biopsy site daily  Clean the wound twice a day with gentle soap and water  Do not scrub, be gentle  Apply white petroleum/Vaseline after cleaning the wound with a cotton swab or a clean finger, and keep the site covered with a Bandaid /bandage. Bandages are not necessary with a scalp biopsy  If you are unable to cover the site with a Bandaid /bandage, re-apply ointment 2-3 times a day to keep the site moist. Moisture will help with healing  Avoid strenuous activity for first 1-2 days  Avoid lakes, rivers, pools, and oceans until the stitches are removed or the site is healed    How do I clean my wound?  Wash hands thoroughly with soap or use hand  before all wound care  Clean the  wound with gentle soap and water  Apply white petroleum/Vaseline  to wound after it is clean  Replace the Bandaid /bandage to keep the wound covered for the first few days or as instructed by your doctor  If you had a scalp biopsy, warm shower water to the area on a daily basis should suffice    What should I use to clean my wound?   Cotton-tipped applicators (Qtips )  White petroleum jelly (Vaseline ). Use a clean new container and use Q-tips to apply.  Bandaids   as needed  Gentle soap     How should I care for my wound long term?  Do not get your wound dirty  Keep up with wound care for one week or until the area is healed.  A small scab will form and fall off by itself when the area is completely healed. The area will be red and will become pink in color as it heals. Sun protection is very important for how your scar will turn out. Sunscreen with an SPF 30 or greater is recommended once the area is healed.  If you have stitches, stitches need to be removed in 7 days on the face/neck, or 10-14 days on the body days. You may return to our clinic for this or you may have it done locally at your doctor s office.  You should have some soreness but it should be mild and slowly go away over several days. Talk to your doctor about using tylenol for pain,    When should I call my doctor?  If you have increased:   Pain or swelling  Pus or drainage (clear or slightly yellow drainage is ok)  Temperature over 100F  Spreading redness or warmth around wound    When will I hear about my results?  The biopsy results can take 2 weeks to come back.  Your results will automatically release to [employer] before your provider has even reviewed them.  The clinic will call you with the results, send you a [employer] message, or have you schedule a follow-up clinic or phone time to discuss the results.  Contact our clinics if you do not hear from us in 2 weeks.    Who should I call with questions?  [facility]:  [phone]  [facility]: [phone]  For urgent needs outside of business hours call the [facility] at [phone] and ask for the dermatology resident on call

## 2023-08-08 LAB
PATH REPORT.COMMENTS IMP SPEC: NORMAL
PATH REPORT.FINAL DX SPEC: NORMAL
PATH REPORT.GROSS SPEC: NORMAL
PATH REPORT.MICROSCOPIC SPEC OTHER STN: NORMAL
PATH REPORT.RELEVANT HX SPEC: NORMAL

## 2023-08-25 ENCOUNTER — HOSPITAL ENCOUNTER (OUTPATIENT)
Dept: MAMMOGRAPHY | Facility: CLINIC | Age: 58
Discharge: HOME | End: 2023-08-25
Attending: INTERNAL MEDICINE | Admitting: INTERNAL MEDICINE
Payer: COMMERCIAL

## 2023-08-25 DIAGNOSIS — Z12.31 ENCOUNTER FOR SCREENING MAMMOGRAM FOR BREAST CANCER: ICD-10-CM

## 2023-08-25 PROCEDURE — 77067 SCR MAMMO BI INCL CAD: CPT

## 2023-09-01 ENCOUNTER — MYC MEDICAL ADVICE (OUTPATIENT)
Dept: INTERNAL MEDICINE | Facility: CLINIC | Age: 58
End: 2023-09-01
Payer: COMMERCIAL

## 2023-09-01 DIAGNOSIS — E66.9 OBESITY, UNSPECIFIED CLASSIFICATION, UNSPECIFIED OBESITY TYPE, UNSPECIFIED WHETHER SERIOUS COMORBIDITY PRESENT: ICD-10-CM

## 2023-09-01 NOTE — TELEPHONE ENCOUNTER
Please see patient's mychart message below.      Please advise, thanks.    Jamar Gómez, Triage RN [facility] [place]  2:11 PM 9/1/2023

## 2023-09-22 ENCOUNTER — MYC MEDICAL ADVICE (OUTPATIENT)
Dept: INTERNAL MEDICINE | Facility: CLINIC | Age: 58
End: 2023-09-22

## 2023-09-22 ENCOUNTER — LAB (OUTPATIENT)
Dept: LAB | Facility: CLINIC | Age: 58
End: 2023-09-22
Payer: COMMERCIAL

## 2023-09-22 DIAGNOSIS — E66.9 OBESITY, UNSPECIFIED CLASSIFICATION, UNSPECIFIED OBESITY TYPE, UNSPECIFIED WHETHER SERIOUS COMORBIDITY PRESENT: ICD-10-CM

## 2023-09-22 DIAGNOSIS — E78.5 HYPERLIPIDEMIA LDL GOAL <160: ICD-10-CM

## 2023-09-22 LAB
ALBUMIN SERPL BCG-MCNC: 4.3 G/DL (ref 3.5–5.2)
ALP SERPL-CCNC: 59 U/L (ref 35–104)
ALT SERPL W P-5'-P-CCNC: 19 U/L (ref 0–50)
ANION GAP SERPL CALCULATED.3IONS-SCNC: 10 MMOL/L (ref 7–15)
AST SERPL W P-5'-P-CCNC: 16 U/L (ref 0–45)
BILIRUB SERPL-MCNC: 0.4 MG/DL
BUN SERPL-MCNC: 13.6 MG/DL (ref 6–20)
CALCIUM SERPL-MCNC: 9.3 MG/DL (ref 8.6–10)
CHLORIDE SERPL-SCNC: 102 MMOL/L (ref 98–107)
CHOLEST SERPL-MCNC: 131 MG/DL
CREAT SERPL-MCNC: 1.03 MG/DL (ref 0.51–0.95)
DEPRECATED HCO3 PLAS-SCNC: 26 MMOL/L (ref 22–29)
EGFRCR SERPLBLD CKD-EPI 2021: 63 ML/MIN/1.73M2
GLUCOSE SERPL-MCNC: 102 MG/DL (ref 70–99)
HDLC SERPL-MCNC: 41 MG/DL
LDLC SERPL CALC-MCNC: 61 MG/DL
NONHDLC SERPL-MCNC: 90 MG/DL
POTASSIUM SERPL-SCNC: 3.8 MMOL/L (ref 3.4–5.3)
PROT SERPL-MCNC: 6.9 G/DL (ref 6.4–8.3)
SODIUM SERPL-SCNC: 138 MMOL/L (ref 136–145)
TRIGL SERPL-MCNC: 143 MG/DL

## 2023-09-22 PROCEDURE — 80053 COMPREHEN METABOLIC PANEL: CPT

## 2023-09-22 PROCEDURE — 36415 COLL VENOUS BLD VENIPUNCTURE: CPT

## 2023-09-22 PROCEDURE — 80061 LIPID PANEL: CPT

## 2023-10-13 ENCOUNTER — VIRTUAL VISIT (OUTPATIENT)
Dept: INTERNAL MEDICINE | Facility: CLINIC | Age: 58
End: 2023-10-13
Payer: COMMERCIAL

## 2023-10-13 DIAGNOSIS — R79.89 ELEVATED SERUM CREATININE: Primary | ICD-10-CM

## 2023-10-13 DIAGNOSIS — E78.5 HYPERLIPIDEMIA LDL GOAL <160: ICD-10-CM

## 2023-10-13 DIAGNOSIS — E66.9 OBESITY, UNSPECIFIED CLASSIFICATION, UNSPECIFIED OBESITY TYPE, UNSPECIFIED WHETHER SERIOUS COMORBIDITY PRESENT: ICD-10-CM

## 2023-10-13 PROCEDURE — 99214 OFFICE O/P EST MOD 30 MIN: CPT | Mod: VID | Performed by: INTERNAL MEDICINE

## 2023-10-13 RX ORDER — ROSUVASTATIN CALCIUM 5 MG/1
5 TABLET, COATED ORAL DAILY
Qty: 90 TABLET | Refills: 1 | Status: SHIPPED | OUTPATIENT
Start: 2023-10-13 | End: 2024-03-01

## 2023-10-13 NOTE — PATIENT INSTRUCTIONS
Plan:  Spironolactone, creatinine  -- check nonfasting creatinine after a week with no spironolactone and  -- check nonfasting creatinine after a week with 1 tablet daily spironolactone  2. in a month you will choose between 1.7 and 2.4 mg Rx. - depending how you tolerate/respond to the 1.7 mg dose.   3. Schedule Annual exam - March 2023  4. Please send me a message to order fasting labs 2-3 weeks before you March appointment

## 2023-10-13 NOTE — PROGRESS NOTES
Brynn is a 57 year old who is being evaluated via a billable video visit.      How would you like to obtain your AVS? Mail a copy  If the video visit is dropped, the invitation should be resent by: Text to cell phone: [phone]  Will anyone else be joining your video visit? No            This is a VIDEO ( using [employer])  encounter with the patient.       Location of the provider : office   Location of the patient : home      07:41 -- 07:02        Dr Capellan's note      Patient's instructions / PLAN:                                                        Plan:  Spironolactone, creatinine  -- check nonfasting creatinine after a week with no spironolactone and  -- check nonfasting creatinine after a week with 1 tablet daily spironolactone  2. in a month you will choose between 1.7 and 2.4 mg Rx. - depending how you tolerate/respond to the 1.7 mg dose.   3. Schedule Annual exam - March 2023  4. Please send me a message to order fasting labs 2-3 weeks before you March appointment           ASSESSMENT & PLAN:                                                      (R79.89) Elevated serum creatinine  (primary encounter diagnosis)  Comment: suspect sec spironolactone  Plan: Basic metabolic panel            (E66.9) Obesity, unspecified classification, unspecified obesity type, unspecified whether serious comorbidity present  Comment:   Plan: Semaglutide-Weight Management (WEGOVY) 2.4         MG/0.75ML pen, Semaglutide-Weight Management         (WEGOVY) 1.7 MG/0.75ML pen            (E78.5) Hyperlipidemia LDL goal <160  Comment:   Plan: rosuvastatin (CRESTOR) 5 MG tablet               Chief complaint:                                                      Follow up chronic medical problems       SUBJECTIVE:                                                    History of present illness:    Elevated creatinine  -- takes spironolactone 100 mg every day    HLip  -- LDL great at 61    Wt  -- Lost 24 lb  -- she will start today Wegovy  1.7mg  -- I will not be in the office in a month to decide with her about 1.7 or 2.4 so I sent both Rx      Subjective   Brynn is a 57 year old, presenting for the following health issues:  Results (Patient is being seen for an lab follow up.)        10/13/2023     7:00 AM   Additional Questions   Roomed by Catie CONLEY     Hyperlipidemia Follow-Up    Are you regularly taking any medication or supplement to lower your cholesterol?   Yes- rosuvastatin  Are you having muscle aches or other side effects that you think could be caused by your cholesterol lowering medication?  No    Hypertension Follow-up    Do you check your blood pressure regularly outside of the clinic? Yes   Are you following a low salt diet? Yes  Are your blood pressures ever more than 140 on the top number (systolic) OR more   than 90 on the bottom number (diastolic), for example 140/90? No        Review of Systems:                                                      ROS: negative for fever, chills, cough, wheezes, chest pain, shortness of breath, vomiting, abdominal pain, leg swelling     OBJECTIVE:           An actual physical exam can't be done during phone visit   A limited exam can sometimes be performed by video visit   NAD      PMHx: reviewed  Past Medical History:   Diagnosis Date    Antiphospholipid antibodies positive 12/14/2011    Arthritis     Baker's cyst of knee     Hypothyroidism     Obesity       PSHx: reviewed  Past Surgical History:   Procedure Laterality Date    Novasure endometrial ablation  10/2007        Meds: reviewed  Current Outpatient Medications   Medication Sig Dispense Refill    albuterol (PROAIR HFA/PROVENTIL HFA/VENTOLIN HFA) 108 (90 Base) MCG/ACT inhaler Inhale 2 puffs into the lungs every 4 hours as needed for shortness of breath / dyspnea or wheezing 18 g 1    benzonatate (TESSALON) 100 MG capsule Take 1 capsule (100 mg) by mouth 3 times daily as needed for cough 30 capsule 0    busPIRone (BUSPAR) 5 MG  tablet Take 1 tablet (5 mg) by mouth 3 times daily 270 tablet 3    doxycycline hyclate (VIBRAMYCIN) 100 MG capsule Take 1 capsule (100 mg) by mouth 2 times daily 20 capsule 0    hydrochlorothiazide (HYDRODIURIL) 25 MG tablet Take 1 tablet (25 mg) by mouth daily 90 tablet 1    levothyroxine (SYNTHROID/LEVOTHROID) 150 MCG tablet TAKE TWO TABLETS BY MOUTH ON MONDAYS AND FRIDAYS, AND TAKE ONE TABLET BY MOUTH ALL OTHER DAYS. 115 tablet 1    LORazepam (ATIVAN) 0.5 MG tablet TAKE ONE-HALF TO ONE TABLET BY MOUTH EVERY 8 HOURS AS NEEDED FOR ANXIETY 20 tablet 0    predniSONE (DELTASONE) 5 MG tablet 4 tabs daily for 1 week, then 3 tabs daily for 1 week 50 tablet 0    rosuvastatin (CRESTOR) 5 MG tablet Take 1 tablet (5 mg) by mouth daily 30 tablet 3    Semaglutide 3 MG TABS Take 3 mg by mouth daily 30 tablet 3    Semaglutide-Weight Management (WEGOVY) 0.25 MG/0.5ML pen Inject 0.25 mg Subcutaneous once a week 2 mL 0    Semaglutide-Weight Management (WEGOVY) 0.25 MG/0.5ML pen Inject 0.25 mg Subcutaneous once a week 2 mL 0    Semaglutide-Weight Management (WEGOVY) 0.5 MG/0.5ML pen Inject 0.5 mg Subcutaneous once a week -- to be started after the 0.25 mg dose 2 mL 0    Semaglutide-Weight Management (WEGOVY) 1 MG/0.5ML pen Inject 1 mg Subcutaneous once a week 2 mL 0    Semaglutide-Weight Management (WEGOVY) 1.7 MG/0.75ML pen Inject 1.7 mg Subcutaneous once a week 3 mL 0    spironolactone (ALDACTONE) 50 MG tablet Take 2 tablets (100 mg) by mouth daily 180 tablet 1    traZODone (DESYREL) 100 MG tablet TAKE ONE TO TWO TABLETS BY MOUTH NIGHTLY AS NEEDED FOR SLEEP 180 tablet 1       Soc Hx: reviewed  Fam Hx: reviewed        Chart documentation was completed, in part, with "[employer] voice-recognition software. Even though reviewed, some grammatical, spelling, and word errors may remain.    Harika Capellan MD  Internal Medicine

## 2023-10-24 ENCOUNTER — LAB (OUTPATIENT)
Dept: LAB | Facility: CLINIC | Age: 58
End: 2023-10-24
Payer: COMMERCIAL

## 2023-10-24 DIAGNOSIS — R79.89 ELEVATED SERUM CREATININE: ICD-10-CM

## 2023-10-24 PROCEDURE — 80048 BASIC METABOLIC PNL TOTAL CA: CPT

## 2023-10-24 PROCEDURE — 36415 COLL VENOUS BLD VENIPUNCTURE: CPT

## 2023-10-25 LAB
ANION GAP SERPL CALCULATED.3IONS-SCNC: 11 MMOL/L (ref 7–15)
BUN SERPL-MCNC: 12.9 MG/DL (ref 6–20)
CALCIUM SERPL-MCNC: 9.3 MG/DL (ref 8.6–10)
CHLORIDE SERPL-SCNC: 100 MMOL/L (ref 98–107)
CREAT SERPL-MCNC: 0.97 MG/DL (ref 0.51–0.95)
DEPRECATED HCO3 PLAS-SCNC: 28 MMOL/L (ref 22–29)
EGFRCR SERPLBLD CKD-EPI 2021: 68 ML/MIN/1.73M2
GLUCOSE SERPL-MCNC: 87 MG/DL (ref 70–99)
POTASSIUM SERPL-SCNC: 3.5 MMOL/L (ref 3.4–5.3)
SODIUM SERPL-SCNC: 139 MMOL/L (ref 135–145)

## 2023-11-07 ENCOUNTER — OFFICE VISIT (OUTPATIENT)
Dept: URGENT CARE | Facility: URGENT CARE | Age: 58
End: 2023-11-07
Payer: COMMERCIAL

## 2023-11-07 VITALS
SYSTOLIC BLOOD PRESSURE: 129 MMHG | HEART RATE: 78 BPM | OXYGEN SATURATION: 99 % | TEMPERATURE: 98 F | RESPIRATION RATE: 20 BRPM | DIASTOLIC BLOOD PRESSURE: 83 MMHG

## 2023-11-07 DIAGNOSIS — J01.00 ACUTE NON-RECURRENT MAXILLARY SINUSITIS: Primary | ICD-10-CM

## 2023-11-07 DIAGNOSIS — R79.89 ELEVATED SERUM CREATININE: ICD-10-CM

## 2023-11-07 PROCEDURE — 99214 OFFICE O/P EST MOD 30 MIN: CPT | Performed by: PHYSICIAN ASSISTANT

## 2023-11-07 RX ORDER — DOXYCYCLINE 100 MG/1
100 CAPSULE ORAL 2 TIMES DAILY
Qty: 28 CAPSULE | Refills: 0 | Status: SHIPPED | OUTPATIENT
Start: 2023-11-07 | End: 2023-11-21

## 2023-11-07 NOTE — PROGRESS NOTES
ASSESSMENT/PLAN:     (J01.00) Acute non-recurrent maxillary sinusitis  (primary encounter diagnosis)    MDM: Progressive right-sided maxillary sinus pain/pressure with purulent postnasal drip after returning from trip to [facility] approximately 1 month ago.  Given her history of reported right upper tooth infection (awaiting endodontist follow-up), progressive occult tooth infection also needs to be considered.  I advised we start a 2-week course of doxycycline today.  If she is not having any improvement in the next 5 days, or if she is having any acute worsening, I have advised she follow-up with both her primary care team and her dentist.  Patient being followed for rising creatinine by primary care team, so of advise she not take any ibuprofen or other NSAIDs unless she is otherwise advised by her primary care team.  ENT referral was provided today (and recognition she may require some specialty follow-up with her more severe symptoms persist).  All above was discussed with patient in detail today prior to her discharge.  Please also see the below discharge summary/plan (which I have reviewed with her verbally and provided in printed form for home review).    Plan: doxycycline monohydrate (MONODOX) 100 MG         capsule, Adult ENT  Referral              November 7, 2023 [facility] Plan:     -Start the Doxycycline Antibiotic (2 weeks)   - Flonase/fluconazole nasal spray (at least 1 month)  -Make a follow-up appointment with ENT in about 2-3 (after you finish antibiotic)   -Keep your appointment with endodontist (12/14/23)   -If you develop any tooth pain, gum swelling or other dental issues, follow-up with endodontist or dentist  -Do not use Ibuprofen (unless cleared by your PCP team) due to your elevated creatine (kidney marker).   -Use Tylenol for pain (do not take more than 3,000 mg in 24 hours)   -If you do not have any improvement after 5 days of antibiotic started here today, follow-up  "with primary care team         (R75.34) Elevated serum creatinine      This progress note has been dictated, with use of voice recognition software. Any grammatical, typographical, or context errors are unintentional and inherent to use of voice recognition software.  -----------------          Chief Complaint   Patient presents with    Sinus Problem     Poss sinus infection          SUBJECTIVE:   Amita Finley presents to clinic today for evaluation of progressively worsening sinus pain (points to right maxillary sinus area)  x approximately 1 month duration.  Patient states, \"It feels like there is a balloon being blown up in my face.  Sometimes it feels like the balloon snaps and I get a gush of horrible smelling drainage.  Drainage is reportedly primarily postnasal drainage.    Patient states the symptoms remind her somewhat of the symptoms she had in July, 2023 when she was treated for a sinus infection with doxycycline.  However, current symptoms are reportedly worse.  Of note, patient notes onset of current symptoms began after she returned from a trip to [facility] on October 7 (where she did lots of snorkeling).  Current endodontist appointment is reportedly 12/14/2023.    In addition to the above, patient lets me know she is following with an endodontist for possible right upper tooth infection.  She has reportedly had some intermittent gum swelling (but does not have any gum swelling currently).    Patient informs me she her primary care team is watching her kidney functions carefully right now (had a elevated creatinine and was taken off her spironolactone).  She was reportedly advised not to take ibuprofen and NSAIDs, she is wondering if she could take an occasional ibuprofen for pain.    Patient states home COVID testing was negative    ROS:     CONSITUTIONAL: Positive for fever of 100.6.  HEENT: Positive as per above  RESP: No acute onset cough, wheezing, or shortness of breath GI: No acute onset " N/V/D No abdominal pain. Normal BM's  SKIN: No  Rash or hives   NEURO: Positive for waxing and waning sinus headache. No severe headaches. No neck stiffness, mental status changes or lethargy       Past Medical History:   Diagnosis Date    Antiphospholipid antibodies positive 12/14/2011    Arthritis     Baker's cyst of knee     Hypothyroidism     Obesity        Patient Active Problem List   Diagnosis    CARDIOVASCULAR SCREENING; LDL GOAL LESS THAN 160    Antiphospholipid antibody positive    Inflammatory arthritis    Edema of both legs    Anxiety    Major depressive disorder, single episode, moderate (H)    Obesity (BMI 35.0-39.9) with comorbidity (H)    Hypothyroidism, unspecified type    Hyperlipidemia LDL goal <160    Rheumatoid arthritis involving both hands with negative rheumatoid factor (H)    Adenomatous polyp of colon - need colonoscopy 2024       Current Outpatient Medications   Medication    doxycycline monohydrate (MONODOX) 100 MG capsule    hydrochlorothiazide (HYDRODIURIL) 25 MG tablet    levothyroxine (SYNTHROID/LEVOTHROID) 150 MCG tablet    rosuvastatin (CRESTOR) 5 MG tablet    Semaglutide-Weight Management (WEGOVY) 1.7 MG/0.75ML pen    Semaglutide-Weight Management (WEGOVY) 2.4 MG/0.75ML pen    traZODone (DESYREL) 100 MG tablet     No current facility-administered medications for this visit.       Allergies   Allergen Reactions    Ciprofloxacin Other (See Comments) and Anaphylaxis     Tongue swelling    Amoxicillin-Pot Clavulanate Other (See Comments)     Burning sensation on hands            OBJECTIVE:   /83   Pulse 78   Temp 98  F (36.7  C)   Resp 20   LMP  (LMP Unknown)   SpO2 99%       General appearance: alert and no apparent distress   Skin color is uniform in color and without rash.   HEENT:   PERRLA. Conjunctiva not injected. Sclera clear.   Left TM is normal: no effusions, no erythema, and normal landmarks.   Right TM is normal: no effusions, no erythema, and normal landmarks.    Nasal mucosa is red and swollen. Right maxillary sinus is tender to percussion.  Remainder of sinuses are nontender to percussion.  Of note-specifically no periorbital or facial swelling.  Specifically no lateral neck swelling.  Oropharyngeal exam is normal other than evidence of post-nasal drip: no lesions, erythema, adenopathy or exudate. Uvula is midline. No trismus.  Neck is supple, full range of motion. No adenopathy. No lateral neck swelling.   CARDIAC:NORMAL - regular rate and rhythm without murmur.   RESP: Normal - CTA without rales, rhonchi, or wheezing.   NEURO: Alert and oriented.  Normal speech and mentation.  CN II/XII grossly intact.  Gait within normal limits.

## 2023-11-07 NOTE — PATIENT INSTRUCTIONS
November 7, 2023 [facility] Plan:     -Start the Doxycycline Antibiotic (2 weeks)   - Flonase/fluconazole nasal spray (at least 1 month)  -Make a follow-up appointment with ENT in about 2-3 (after you finish antibiotic)   -Keep your appointment with endodontist (12/14/23)   -If you develop any tooth pain, gum swelling or other dental issues, follow-up with endodontist or dentist  -Do not use Ibuprofen (unless cleared by your PCP team) due to your elevated creatine (kidney marker).   -Use Tylenol for pain (do not take more than 3,000 mg in 24 hours)   -If you do not have any improvement after 5 days of antibiotic started here today, follow-up with primary care team

## 2023-11-19 ENCOUNTER — MYC MEDICAL ADVICE (OUTPATIENT)
Dept: INTERNAL MEDICINE | Facility: CLINIC | Age: 58
End: 2023-11-19
Payer: COMMERCIAL

## 2023-11-19 DIAGNOSIS — E66.9 OBESITY, UNSPECIFIED CLASSIFICATION, UNSPECIFIED OBESITY TYPE, UNSPECIFIED WHETHER SERIOUS COMORBIDITY PRESENT: ICD-10-CM

## 2023-11-20 NOTE — TELEPHONE ENCOUNTER
Please review MyChart message and advise regarding follow-up labs as patient has stopped Spironolactone and does not plan to restart.     Patient also requesting ongoing prescription with refills for Wegovy 2.4 mg.    Claritza Garcia RN  [facility]

## 2023-11-22 NOTE — TELEPHONE ENCOUNTER
Sent [facility] message to patient.    Jamar Gómez, Triage RN [employer] [place]  12:15 PM 11/22/2023

## 2023-11-24 ENCOUNTER — LAB (OUTPATIENT)
Dept: LAB | Facility: CLINIC | Age: 58
End: 2023-11-24
Payer: COMMERCIAL

## 2023-11-24 DIAGNOSIS — R79.89 ELEVATED SERUM CREATININE: ICD-10-CM

## 2023-11-24 LAB
ANION GAP SERPL CALCULATED.3IONS-SCNC: 8 MMOL/L (ref 7–15)
BUN SERPL-MCNC: 8.2 MG/DL (ref 6–20)
CALCIUM SERPL-MCNC: 8.7 MG/DL (ref 8.6–10)
CHLORIDE SERPL-SCNC: 105 MMOL/L (ref 98–107)
CREAT SERPL-MCNC: 0.95 MG/DL (ref 0.51–0.95)
DEPRECATED HCO3 PLAS-SCNC: 28 MMOL/L (ref 22–29)
EGFRCR SERPLBLD CKD-EPI 2021: 70 ML/MIN/1.73M2
GLUCOSE SERPL-MCNC: 95 MG/DL (ref 70–99)
POTASSIUM SERPL-SCNC: 3.8 MMOL/L (ref 3.4–5.3)
SODIUM SERPL-SCNC: 141 MMOL/L (ref 135–145)

## 2023-11-24 PROCEDURE — 36415 COLL VENOUS BLD VENIPUNCTURE: CPT

## 2023-11-24 PROCEDURE — 80048 BASIC METABOLIC PNL TOTAL CA: CPT

## 2024-01-03 DIAGNOSIS — E66.9 OBESITY, UNSPECIFIED CLASSIFICATION, UNSPECIFIED OBESITY TYPE, UNSPECIFIED WHETHER SERIOUS COMORBIDITY PRESENT: ICD-10-CM

## 2024-01-04 RX ORDER — SEMAGLUTIDE 2.4 MG/.75ML
INJECTION, SOLUTION SUBCUTANEOUS
Qty: 3 ML | Refills: 0 | Status: SHIPPED | OUTPATIENT
Start: 2024-01-04 | End: 2024-01-28

## 2024-01-26 DIAGNOSIS — E66.9 OBESITY, UNSPECIFIED CLASSIFICATION, UNSPECIFIED OBESITY TYPE, UNSPECIFIED WHETHER SERIOUS COMORBIDITY PRESENT: ICD-10-CM

## 2024-01-28 RX ORDER — SEMAGLUTIDE 2.4 MG/.75ML
INJECTION, SOLUTION SUBCUTANEOUS
Qty: 3 ML | Refills: 0 | Status: SHIPPED | OUTPATIENT
Start: 2024-01-28 | End: 2024-03-01

## 2024-01-29 ENCOUNTER — TELEPHONE (OUTPATIENT)
Dept: INTERNAL MEDICINE | Facility: CLINIC | Age: 59
End: 2024-01-29
Payer: COMMERCIAL

## 2024-01-29 NOTE — TELEPHONE ENCOUNTER
Prior Authorization Retail Medication Request    Medication/Dose: Wegovy 2.4 mg/0.75 mL  Diagnosis and ICD code (if different than what is on RX):    New/renewal/insurance change PA/secondary ins. PA:  Previously Tried and Failed:    Rationale:      Insurance   Primary: BC/BS Federal  Insurance ID:  [id number]    Secondary (if applicable):  Insurance ID:      Pharmacy Information (if different than what is on RX)  Name:  [facility]  Phone:  [phone]  Fax:    Thank you,  Rupa Keith CPhT  [facility]

## 2024-02-07 ENCOUNTER — MYC REFILL (OUTPATIENT)
Dept: INTERNAL MEDICINE | Facility: CLINIC | Age: 59
End: 2024-02-07
Payer: COMMERCIAL

## 2024-02-07 DIAGNOSIS — E66.9 OBESITY, UNSPECIFIED CLASSIFICATION, UNSPECIFIED OBESITY TYPE, UNSPECIFIED WHETHER SERIOUS COMORBIDITY PRESENT: ICD-10-CM

## 2024-02-07 RX ORDER — SEMAGLUTIDE 2.4 MG/.75ML
INJECTION, SOLUTION SUBCUTANEOUS
Qty: 3 ML | Refills: 0 | Status: CANCELLED | OUTPATIENT
Start: 2024-02-07

## 2024-02-09 NOTE — TELEPHONE ENCOUNTER
PA Initiation    Medication: WEGOVY 2.4 MG/0.75ML SC SOAJ  Insurance Company: [employer] - Phone [phone] Fax [phone]  Pharmacy Filling the Rx: [facility] - [id number]   Filling Pharmacy Phone: [phone]  Filling Pharmacy Fax: [phone]  Start Date: 2/9/2024

## 2024-02-12 NOTE — TELEPHONE ENCOUNTER
Prior Authorization Approval    Medication: WEGOVY 2.4 MG/0.75ML SC SOAJ  Authorization Effective Date: 1/10/2024  Authorization Expiration Date: 2/8/2025  Approved Dose/Quantity:   Reference #:     Insurance Company: [employer] - Phone [phone] Fax [phone]  Expected CoPay: $    CoPay Card Available:      Financial Assistance Needed:   Which Pharmacy is filling the prescription: [facility] - [address]   Pharmacy Notified: YES  Patient Notified: **Instructed pharmacy to notify patient when script is ready to /ship.**

## 2024-03-01 ENCOUNTER — OFFICE VISIT (OUTPATIENT)
Dept: INTERNAL MEDICINE | Facility: CLINIC | Age: 59
End: 2024-03-01
Payer: COMMERCIAL

## 2024-03-01 VITALS
TEMPERATURE: 97.6 F | DIASTOLIC BLOOD PRESSURE: 73 MMHG | HEART RATE: 104 BPM | BODY MASS INDEX: 36.82 KG/M2 | RESPIRATION RATE: 18 BRPM | HEIGHT: 66 IN | OXYGEN SATURATION: 97 % | WEIGHT: 229.1 LBS | SYSTOLIC BLOOD PRESSURE: 105 MMHG

## 2024-03-01 DIAGNOSIS — E03.9 HYPOTHYROIDISM, UNSPECIFIED TYPE: ICD-10-CM

## 2024-03-01 DIAGNOSIS — Z11.4 ENCOUNTER FOR SCREENING FOR HIV: ICD-10-CM

## 2024-03-01 DIAGNOSIS — E78.5 HYPERLIPIDEMIA LDL GOAL <160: ICD-10-CM

## 2024-03-01 DIAGNOSIS — Z00.00 ROUTINE GENERAL MEDICAL EXAMINATION AT A HEALTH CARE FACILITY: Primary | ICD-10-CM

## 2024-03-01 DIAGNOSIS — E66.9 OBESITY, UNSPECIFIED CLASSIFICATION, UNSPECIFIED OBESITY TYPE, UNSPECIFIED WHETHER SERIOUS COMORBIDITY PRESENT: ICD-10-CM

## 2024-03-01 DIAGNOSIS — F51.01 PRIMARY INSOMNIA: ICD-10-CM

## 2024-03-01 DIAGNOSIS — D12.6 ADENOMATOUS POLYP OF COLON, UNSPECIFIED PART OF COLON: ICD-10-CM

## 2024-03-01 DIAGNOSIS — R60.0 EDEMA OF BOTH LEGS: ICD-10-CM

## 2024-03-01 LAB
ERYTHROCYTE [DISTWIDTH] IN BLOOD BY AUTOMATED COUNT: 12.7 % (ref 10–15)
HCT VFR BLD AUTO: 41.7 % (ref 35–47)
HGB BLD-MCNC: 14.3 G/DL (ref 11.7–15.7)
MCH RBC QN AUTO: 28.9 PG (ref 26.5–33)
MCHC RBC AUTO-ENTMCNC: 34.3 G/DL (ref 31.5–36.5)
MCV RBC AUTO: 84 FL (ref 78–100)
PLATELET # BLD AUTO: 235 10E3/UL (ref 150–450)
RBC # BLD AUTO: 4.95 10E6/UL (ref 3.8–5.2)
WBC # BLD AUTO: 6.3 10E3/UL (ref 4–11)

## 2024-03-01 PROCEDURE — 36415 COLL VENOUS BLD VENIPUNCTURE: CPT | Performed by: INTERNAL MEDICINE

## 2024-03-01 PROCEDURE — 87389 HIV-1 AG W/HIV-1&-2 AB AG IA: CPT | Performed by: INTERNAL MEDICINE

## 2024-03-01 PROCEDURE — 99214 OFFICE O/P EST MOD 30 MIN: CPT | Mod: 25 | Performed by: INTERNAL MEDICINE

## 2024-03-01 PROCEDURE — 84443 ASSAY THYROID STIM HORMONE: CPT | Performed by: INTERNAL MEDICINE

## 2024-03-01 PROCEDURE — 85027 COMPLETE CBC AUTOMATED: CPT | Performed by: INTERNAL MEDICINE

## 2024-03-01 PROCEDURE — 80053 COMPREHEN METABOLIC PANEL: CPT | Performed by: INTERNAL MEDICINE

## 2024-03-01 PROCEDURE — 80061 LIPID PANEL: CPT | Performed by: INTERNAL MEDICINE

## 2024-03-01 PROCEDURE — 99396 PREV VISIT EST AGE 40-64: CPT | Performed by: INTERNAL MEDICINE

## 2024-03-01 RX ORDER — TRAZODONE HYDROCHLORIDE 100 MG/1
TABLET ORAL
Qty: 180 TABLET | Refills: 4 | Status: SHIPPED | OUTPATIENT
Start: 2024-03-01

## 2024-03-01 RX ORDER — ROSUVASTATIN CALCIUM 5 MG/1
5 TABLET, COATED ORAL DAILY
Qty: 90 TABLET | Refills: 4 | Status: SHIPPED | OUTPATIENT
Start: 2024-03-01

## 2024-03-01 RX ORDER — LEVOTHYROXINE SODIUM 150 UG/1
TABLET ORAL
Qty: 115 TABLET | Refills: 4 | Status: SHIPPED | OUTPATIENT
Start: 2024-03-01

## 2024-03-01 RX ORDER — SEMAGLUTIDE 2.4 MG/.75ML
INJECTION, SOLUTION SUBCUTANEOUS
Qty: 3 ML | Refills: 5 | Status: SHIPPED | OUTPATIENT
Start: 2024-03-01

## 2024-03-01 RX ORDER — HYDROCHLOROTHIAZIDE 25 MG/1
25 TABLET ORAL DAILY
Qty: 90 TABLET | Refills: 4 | Status: SHIPPED | OUTPATIENT
Start: 2024-03-01

## 2024-03-01 SDOH — HEALTH STABILITY: PHYSICAL HEALTH: ON AVERAGE, HOW MANY DAYS PER WEEK DO YOU ENGAGE IN MODERATE TO STRENUOUS EXERCISE (LIKE A BRISK WALK)?: 2 DAYS

## 2024-03-01 ASSESSMENT — PATIENT HEALTH QUESTIONNAIRE - PHQ9
SUM OF ALL RESPONSES TO PHQ QUESTIONS 1-9: 3
10. IF YOU CHECKED OFF ANY PROBLEMS, HOW DIFFICULT HAVE THESE PROBLEMS MADE IT FOR YOU TO DO YOUR WORK, TAKE CARE OF THINGS AT HOME, OR GET ALONG WITH OTHER PEOPLE: NOT DIFFICULT AT ALL
SUM OF ALL RESPONSES TO PHQ QUESTIONS 1-9: 3

## 2024-03-01 ASSESSMENT — PAIN SCALES - GENERAL: PAINLEVEL: SEVERE PAIN (6)

## 2024-03-01 ASSESSMENT — SOCIAL DETERMINANTS OF HEALTH (SDOH): HOW OFTEN DO YOU GET TOGETHER WITH FRIENDS OR RELATIVES?: ONCE A WEEK

## 2024-03-01 NOTE — PROGRESS NOTES
Dr Capellan's note    Patient's instructions / PLAN:                                                        Plan:  Continue same meds, same doses for now   2.  Labs today - [address]   3. Please ask [facility] to send a copy of the colonoscopy  4. Mammogram ( please call [phone] to schedule it) after Aug 25  5. Schedule appointment for pap        ASSESSMENT & PLAN:                                                      (Z00.00) Routine general medical examination at a health care facility  (primary encounter diagnosis)  Comment:   Plan: CBC with platelets, Comprehensive metabolic         panel, Lipid panel reflex to direct LDL         Fasting, TSH with free T4 reflex            (D12.6) Adenomatous polyp of colon - need colonoscopy 2024  Comment:   Plan:     (R60.0) Edema of both legs  Comment: Controlled    Plan: hydrochlorothiazide (HYDRODIURIL) 25 MG tablet            (E03.9) Hypothyroidism, unspecified type  Comment: Controlled  based on prior numbers   Plan: levothyroxine (SYNTHROID/LEVOTHROID) 150 MCG         tablet, TSH with free T4 reflex            (E78.5) Hyperlipidemia LDL goal <160  Comment: Controlled  based on prior numbers   Plan: rosuvastatin (CRESTOR) 5 MG tablet,         Comprehensive metabolic panel, Lipid panel         reflex to direct LDL Fasting            (F51.01) Primary insomnia  Comment:   Plan: traZODone (DESYREL) 100 MG tablet            (E66.9) Obesity, unspecified classification, unspecified obesity type, unspecified whether serious comorbidity present  Comment: no side effects   It helps her loose wt and she wants to continue it  Plan: Semaglutide-Weight Management (WEGOVY) 2.4         MG/0.75ML pen            (Z11.4) Encounter for screening for HIV  Comment: no risk factors   Plan: HIV Antigen Antibody Combo               Chief Complaint:                                                        Annual exam  Follow up chronic medical problems      SUBJECTIVE:                                                     History of present illness     We reviewed the chronic medical problems as above.   I reviewed the recent tests results in Epic       ROS:     See below    General: Negative for fever, chills, major weight changes, fatigue  Skin: Negative for rashes, abnormal spots  Eyes: Negative for blurred or double vision  ENT/mouth: Negative for sinuses discomfort, earache, sore throat  Respiratory: Negative for cough, wheezes, chronic lung disease  Cardiovascular: Negative for rest or exertional chest pain, shortness of breath, palpitations, leg edema,   Gastrointestinal: Negative for vomiting, abdominal pain, heartburn, blood in stool, diarrhea, constipation  Genitourinary: Negative for urinary frequency, blood in urine, history of kidney stones  Female: Negative for abnormal vaginal bleeding, vaginal discharge  Neuro: Negative for headaches, numbness, tingling, weakness in arms or legs, history of seizure, recent syncope  Psychiatry: Negative for depression, anxiety, suicidal thoughts  Endo: Negative for known thyroid disease, diabetes.  Hemato/Lymph: Negative for nodes, easy bleeding, history of DVT, blood transfusion  Musculoskeletal: Negative for joint swelling, back pain      PMHx: - reviewed  Past Medical History:   Diagnosis Date    Antiphospholipid antibodies positive 12/14/2011    Arthritis     Baker's cyst of knee     Hypothyroidism     Obesity        PSHx: reviewed  Past Surgical History:   Procedure Laterality Date    Novasure endometrial ablation  10/2007        Soc Hx: No daily alcohol, no smoking  Social History     Socioeconomic History    Marital status:      Spouse name: Not on file    Number of children: 2    Years of education: Not on file    Highest education level: Not on file   Occupational History    Occupation: nurse     Employer: [employer]     Comment: [employer]   Tobacco Use    Smoking status: Former     Packs/day: 1.50     Years: 20.00     Additional  pack years: 0.00     Total pack years: 30.00     Types: Cigarettes     Quit date: 1996     Years since quittin.1     Passive exposure: Past    Smokeless tobacco: Former     Quit date: 1987   Vaping Use    Vaping Use: Never used   Substance and Sexual Activity    Alcohol use: Yes     Alcohol/week: 3.0 standard drinks of alcohol     Types: 3 Standard drinks or equivalent per week     Comment: wine on the weekend    Drug use: No    Sexual activity: Yes     Partners: Male     Birth control/protection: Surgical   Other Topics Concern    Parent/sibling w/ CABG, MI or angioplasty before 65F 55M? Not Asked   Social History Narrative    Not on file     Social Determinants of Health     Financial Resource Strain: Low Risk  (3/1/2024)    Financial Resource Strain     Within the past 12 months, have you or your family members you live with been unable to get utilities (heat, electricity) when it was really needed?: No   Food Insecurity: Low Risk  (3/1/2024)    Food Insecurity     Within the past 12 months, did you worry that your food would run out before you got money to buy more?: No     Within the past 12 months, did the food you bought just not last and you didn t have money to get more?: No   Transportation Needs: Low Risk  (3/1/2024)    Transportation Needs     Within the past 12 months, has lack of transportation kept you from medical appointments, getting your medicines, non-medical meetings or appointments, work, or from getting things that you need?: No   Physical Activity: Unknown (3/1/2024)    Exercise Vital Sign     Days of Exercise per Week: 2 days     Minutes of Exercise per Session: Not on file   Stress: Stress Concern Present (3/1/2024)    [employer] - Occupational Stress Questionnaire     Feeling of Stress : Rather much   Social Connections: Unknown (3/1/2024)    Social Connection and Isolation Panel [NHANES]     Frequency of Communication with Friends and Family: Not  on file     Frequency of Social Gatherings with Friends and Family: Once a week     Attends Latter-day Services: Not on file     Active Member of Clubs or Organizations: Not on file     Attends Club or Organization Meetings: Not on file     Marital Status: Not on file   Interpersonal Safety: Low Risk  (3/1/2024)    Interpersonal Safety     Do you feel physically and emotionally safe where you currently live?: Yes     Within the past 12 months, have you been hit, slapped, kicked or otherwise physically hurt by someone?: No     Within the past 12 months, have you been humiliated or emotionally abused in other ways by your partner or ex-partner?: No   Housing Stability: Low Risk  (3/1/2024)    Housing Stability     Do you have housing? : Yes     Are you worried about losing your housing?: No        Fam Hx: reviewed  Family History   Problem Relation Age of Onset    Gastrointestinal Disease Mother         in 60's    Hypertension Mother     C.A.D. Father     Diabetes Father     Cardiovascular Father         First MI age 66    Glaucoma Father     Hypertension Father     Squamous cell carcinoma Father     Cancer Maternal Grandmother     Melanoma Brother     No Known Problems Sister     Thyroid Disease Daughter     Hypothyroidism Daughter     Macular Degeneration No family hx of          Screening: reviewed      All: reviewed    Meds: reviewed  Current Outpatient Medications   Medication Sig Dispense Refill    hydrochlorothiazide (HYDRODIURIL) 25 MG tablet Take 1 tablet (25 mg) by mouth daily 90 tablet 1    levothyroxine (SYNTHROID/LEVOTHROID) 150 MCG tablet TAKE TWO TABLETS BY MOUTH ON MONDAYS AND FRIDAYS, AND TAKE ONE TABLET BY MOUTH ALL OTHER DAYS. 115 tablet 1    rosuvastatin (CRESTOR) 5 MG tablet Take 1 tablet (5 mg) by mouth daily 90 tablet 1    Semaglutide-Weight Management (WEGOVY) 1.7 MG/0.75ML pen Inject 1.7 mg Subcutaneous once a week 3 mL 0    traZODone (DESYREL) 100 MG tablet TAKE ONE TO TWO TABLETS BY MOUTH  "NIGHTLY AS NEEDED FOR SLEEP 180 tablet 1    WEGOVY 2.4 MG/0.75ML pen INJECT 2.4 MG UNDER THE SKIN ONCE A WEEK 3 mL 0       OBJECTIVE:                                                    Physical Exam :  Blood pressure 105/73, pulse 104, temperature 97.6  F (36.4  C), temperature source Tympanic, resp. rate 18, height 1.67 m (5' 5.75\"), weight 103.9 kg (229 lb 1.6 oz), SpO2 97%, not currently breastfeeding.     NAD, appears comfortable  Skin clear, no rashes  Neck: supple, no JVD,  no thyroidmegaly  Lymph nodes non palpable in the cervical, supraclavicular axillaries,   Chest: clear to auscultation with good respiratory effort  Cardiac: S1S2, RRR, no mgr appreciated  Abdomen: soft, not tender, not distended, audible bowel sound, no hepatosplenomegaly, no palpable masses, no abdominal bruits  Extremities: no cyanosis, clubbing or edema.   Neuro: A, Ox3, no focal signs.  Breast exam in supine and erect position: they are symmetrical, no skin changes, no tenderness or nodes on palpation. Nipples are erect, no skin lesions, no discharge on pressure.    Pelvic exam:  refused        Patient has been advised of split billing requirements and indicates understanding: Yes.  At the check in, at the      Harika Capellan MD  Internal Medicine       ###########################################    Preventive Care Visit  [facility]  Harika Michel MD, Internal Medicine  Mar 1, 2024        Hetal Swain is a 58 year old, presenting for the following:  Physical        3/1/2024     6:53 AM   Additional Questions   Roomed by Catie Martinez        Health Care Directive  Patient does not have a Health Care Directive or Living Will: Discussed advance care planning with patient; however, patient declined at this time.    HPI      Hyperlipidemia Follow-Up    Are you regularly taking any medication or supplement to lower your cholesterol?   Yes- rosuvastaton  Are you having muscle aches or " other side effects that you think could be caused by your cholesterol lowering medication?  No        3/1/2024   General Health   How would you rate your overall physical health? Good   Feel stress (tense, anxious, or unable to sleep) Rather much   (!) STRESS CONCERN      3/1/2024   Nutrition   Three or more servings of calcium each day? Yes   Diet: Regular (no restrictions)   How many servings of fruit and vegetables per day? 4 or more   How many sweetened beverages each day? 0-1         3/1/2024   Exercise   Days per week of moderate/strenous exercise 2 days   (!) EXERCISE CONCERN      3/1/2024   Social Factors   Frequency of gathering with friends or relatives Once a week   Worry food won't last until get money to buy more No   Food not last or not have enough money for food? No   Do you have housing?  Yes   Are you worried about losing your housing? No   Lack of transportation? No   Unable to get utilities (heat,electricity)? No         3/1/2024   Fall Risk   Fallen 2 or more times in the past year? No   Trouble with walking or balance? No          3/1/2024   Dental   Dentist two times every year? Yes         3/1/2024   TB Screening   Were you born outside of [place]?  No       Today's PHQ-9 Score:       3/1/2024     6:52 AM   PHQ-9 SCORE   PHQ-9 Total Score MyChart 3 (Minimal depression)   PHQ-9 Total Score 3         3/1/2024   Substance Use   Alcohol more than 3/day or more than 7/wk No   Do you use any other substances recreationally? (!) ALCOHOL     Social History     Tobacco Use    Smoking status: Former     Packs/day: 1.50     Years: 20.00     Additional pack years: 0.00     Total pack years: 30.00     Types: Cigarettes     Quit date: 1996     Years since quittin.1     Passive exposure: Past    Smokeless tobacco: Former     Quit date: 1987   Vaping Use    Vaping Use: Never used   Substance Use Topics    Alcohol use: Yes     Alcohol/week: 3.0 standard drinks of alcohol     Types: 3 Standard drinks  "or equivalent per week     Comment: wine on the weekend    Drug use: No           8/25/2023   LAST FHS-7 RESULTS   1st degree relative breast or ovarian cancer No   Any relative bilateral breast cancer No   Any male have breast cancer No   Any ONE woman have BOTH breast AND ovarian cancer No   Any woman with breast cancer before 50yrs No   2 or more relatives with breast AND/OR ovarian cancer No   2 or more relatives with breast AND/OR bowel cancer No                  3/1/2024   One time HIV Screening   Previous HIV test? Yes         3/1/2024   STI Screening   New sexual partner(s) since last STI/HIV test? No     History of abnormal Pap smear:         Latest Ref Rng & Units 1/4/2019     8:30 AM 1/4/2019     8:24 AM 2/26/2014    12:00 AM   PAP / HPV   PAP (Historical)   NIL  NIL    HPV 16 DNA NEG^Negative Negative      HPV 18 DNA NEG^Negative Negative      Other HR HPV NEG^Negative Negative        ASCVD Risk   The 10-year ASCVD risk score (Ruiz SHEA, et al., 2019) is: 2.4%    Values used to calculate the score:      Age: 58 years      Sex: Female      Is Non- : No      Diabetic: No      Tobacco smoker: No      Systolic Blood Pressure: 129 mmHg      Is BP treated: No      HDL Cholesterol: 41 mg/dL      Total Cholesterol: 131 mg/dL           Reviewed and updated as needed this visit by Provider                             Objective    Exam  Resp 18   Ht 1.67 m (5' 5.75\")   LMP  (LMP Unknown)   Breastfeeding No   BMI 41.55 kg/m     Estimated body mass index is 41.55 kg/m  as calculated from the following:    Height as of this encounter: 1.67 m (5' 5.75\").    Weight as of 7/18/23: 115.9 kg (255 lb 8 oz).    Physical Exam        Signed Electronically by: Harika Michel MD    Answers submitted by the patient for this visit:  Patient Health Questionnaire (Submitted on 3/1/2024)  If you checked off any problems, how difficult have these problems made it for you to do your " work, take care of things at home, or get along with other people?: Not difficult at all  PHQ9 TOTAL SCORE: 3

## 2024-03-01 NOTE — PATIENT INSTRUCTIONS
Plan:  Continue same meds, same doses for now   2.  Labs today - [address]   3. Please ask [facility] to send a copy of the colonoscopy  4. Mammogram ( please call [phone] to schedule it) after Aug 25  5. Schedule appointment for pap

## 2024-03-02 LAB
ALBUMIN SERPL BCG-MCNC: 4.5 G/DL (ref 3.5–5.2)
ALP SERPL-CCNC: 56 U/L (ref 40–150)
ALT SERPL W P-5'-P-CCNC: 29 U/L (ref 0–50)
ANION GAP SERPL CALCULATED.3IONS-SCNC: 11 MMOL/L (ref 7–15)
AST SERPL W P-5'-P-CCNC: 20 U/L (ref 0–45)
BILIRUB SERPL-MCNC: 0.3 MG/DL
BUN SERPL-MCNC: 17.6 MG/DL (ref 6–20)
CALCIUM SERPL-MCNC: 9.2 MG/DL (ref 8.6–10)
CHLORIDE SERPL-SCNC: 103 MMOL/L (ref 98–107)
CHOLEST SERPL-MCNC: 174 MG/DL
CREAT SERPL-MCNC: 1.17 MG/DL (ref 0.51–0.95)
DEPRECATED HCO3 PLAS-SCNC: 26 MMOL/L (ref 22–29)
EGFRCR SERPLBLD CKD-EPI 2021: 54 ML/MIN/1.73M2
FASTING STATUS PATIENT QL REPORTED: YES
GLUCOSE SERPL-MCNC: 90 MG/DL (ref 70–99)
HDLC SERPL-MCNC: 52 MG/DL
HIV 1+2 AB+HIV1 P24 AG SERPL QL IA: NONREACTIVE
LDLC SERPL CALC-MCNC: 92 MG/DL
NONHDLC SERPL-MCNC: 122 MG/DL
POTASSIUM SERPL-SCNC: 3.6 MMOL/L (ref 3.4–5.3)
PROT SERPL-MCNC: 6.6 G/DL (ref 6.4–8.3)
SODIUM SERPL-SCNC: 140 MMOL/L (ref 135–145)
TRIGL SERPL-MCNC: 149 MG/DL
TSH SERPL DL<=0.005 MIU/L-ACNC: 1.21 UIU/ML (ref 0.3–4.2)

## 2024-03-03 ENCOUNTER — MYC MEDICAL ADVICE (OUTPATIENT)
Dept: INTERNAL MEDICINE | Facility: CLINIC | Age: 59
End: 2024-03-03
Payer: COMMERCIAL

## 2024-03-03 DIAGNOSIS — R79.89 ELEVATED SERUM CREATININE: Primary | ICD-10-CM

## 2024-03-04 ENCOUNTER — PATIENT OUTREACH (OUTPATIENT)
Dept: GASTROENTEROLOGY | Facility: CLINIC | Age: 59
End: 2024-03-04
Payer: COMMERCIAL

## 2024-03-04 DIAGNOSIS — Z12.11 SPECIAL SCREENING FOR MALIGNANT NEOPLASMS, COLON: Primary | ICD-10-CM

## 2024-03-04 NOTE — PROGRESS NOTES
"Hx adenomatous polyps. Recalled in 5 years. Due January 2024  CRC Screening Colonoscopy Referral Review    Patient meets the inclusion criteria for screening colonoscopy standing order.    Ordering/Referring Provider:  Harika Michel    BMI: Estimated body mass index is 37.26 kg/m  as calculated from the following:    Height as of 3/1/24: 1.67 m (5' 5.75\").    Weight as of 3/1/24: 103.9 kg (229 lb 1.6 oz).     Sedation:  Does patient have any of the following conditions affecting sedation?  No medical conditions affecting sedation.    Previous Scopes:  Any previous recommendations or follow up needs based on previous scope?  na / No recommendations.    Medical Concerns to Postpone Order:  Does patient have any of the following medical concerns that should postpone/delay colonoscopy referral?  No medical conditions affecting colonoscopy referral.    Final Referral Details:  Based on patient's medical history patient is appropriate for referral order with moderate sedation. If patient's BMI > 50 do not schedule in ASC.  "

## 2024-05-10 ENCOUNTER — TRANSFERRED RECORDS (OUTPATIENT)
Dept: HEALTH INFORMATION MANAGEMENT | Facility: CLINIC | Age: 59
End: 2024-05-10
Payer: COMMERCIAL

## 2024-07-26 ENCOUNTER — PATIENT OUTREACH (OUTPATIENT)
Dept: CARE COORDINATION | Facility: CLINIC | Age: 59
End: 2024-07-26
Payer: COMMERCIAL

## 2024-08-23 ENCOUNTER — PATIENT OUTREACH (OUTPATIENT)
Dept: CARE COORDINATION | Facility: CLINIC | Age: 59
End: 2024-08-23
Payer: COMMERCIAL

## 2024-08-25 ENCOUNTER — MYC MEDICAL ADVICE (OUTPATIENT)
Dept: INTERNAL MEDICINE | Facility: CLINIC | Age: 59
End: 2024-08-25
Payer: COMMERCIAL

## 2024-08-27 ENCOUNTER — TELEPHONE (OUTPATIENT)
Dept: INTERNAL MEDICINE | Facility: CLINIC | Age: 59
End: 2024-08-27
Payer: COMMERCIAL

## 2024-08-27 DIAGNOSIS — Z11.1 SCREENING EXAMINATION FOR PULMONARY TUBERCULOSIS: Primary | ICD-10-CM

## 2024-08-27 NOTE — TELEPHONE ENCOUNTER
Order/Referral Request    Who is requesting: patient    Orders being requested: [facility]    Reason service is needed/diagnosis: school    When are orders needed by: Thursday 8/29    Has this been discussed with Provider: No    Does patient have a preference on a Group/Provider/Facility?     Does patient have an appointment scheduled?: Yes: 8/29    Where to send orders: Place orders within Epic    Could we send this information to you in [facility] or would you prefer to receive a phone call?:   Patient would prefer a phone call   Okay to leave a detailed message?: Yes at Cell number on file:    Telephone Information:   Mobile [phone]

## 2024-08-29 ENCOUNTER — LAB (OUTPATIENT)
Dept: LAB | Facility: CLINIC | Age: 59
End: 2024-08-29
Payer: COMMERCIAL

## 2024-08-29 DIAGNOSIS — Z11.1 SCREENING EXAMINATION FOR PULMONARY TUBERCULOSIS: ICD-10-CM

## 2024-08-29 DIAGNOSIS — R79.89 ELEVATED SERUM CREATININE: ICD-10-CM

## 2024-08-29 LAB
ALBUMIN UR-MCNC: NEGATIVE MG/DL
APPEARANCE UR: CLEAR
BACTERIA #/AREA URNS HPF: ABNORMAL /HPF
BILIRUB UR QL STRIP: NEGATIVE
COLOR UR AUTO: YELLOW
ERYTHROCYTE [SEDIMENTATION RATE] IN BLOOD BY WESTERGREN METHOD: 8 MM/HR (ref 0–30)
GLUCOSE UR STRIP-MCNC: NEGATIVE MG/DL
HGB UR QL STRIP: NEGATIVE
KETONES UR STRIP-MCNC: NEGATIVE MG/DL
LEUKOCYTE ESTERASE UR QL STRIP: ABNORMAL
NITRATE UR QL: NEGATIVE
PH UR STRIP: 7 [PH] (ref 5–7)
RBC #/AREA URNS AUTO: ABNORMAL /HPF
SP GR UR STRIP: 1.01 (ref 1–1.03)
SQUAMOUS #/AREA URNS AUTO: ABNORMAL /LPF
UROBILINOGEN UR STRIP-ACNC: 0.2 E.U./DL
WBC #/AREA URNS AUTO: ABNORMAL /HPF

## 2024-08-29 PROCEDURE — 82043 UR ALBUMIN QUANTITATIVE: CPT

## 2024-08-29 PROCEDURE — 85652 RBC SED RATE AUTOMATED: CPT

## 2024-08-29 PROCEDURE — 80048 BASIC METABOLIC PNL TOTAL CA: CPT

## 2024-08-29 PROCEDURE — 86481 TB AG RESPONSE T-CELL SUSP: CPT

## 2024-08-29 PROCEDURE — 82570 ASSAY OF URINE CREATININE: CPT

## 2024-08-29 PROCEDURE — 81001 URINALYSIS AUTO W/SCOPE: CPT

## 2024-08-29 PROCEDURE — 86140 C-REACTIVE PROTEIN: CPT

## 2024-08-29 PROCEDURE — 36415 COLL VENOUS BLD VENIPUNCTURE: CPT

## 2024-08-30 LAB
ANION GAP SERPL CALCULATED.3IONS-SCNC: 10 MMOL/L (ref 7–15)
BUN SERPL-MCNC: 9.3 MG/DL (ref 6–20)
CALCIUM SERPL-MCNC: 8.8 MG/DL (ref 8.8–10.4)
CHLORIDE SERPL-SCNC: 98 MMOL/L (ref 98–107)
CREAT SERPL-MCNC: 0.89 MG/DL (ref 0.51–0.95)
CREAT UR-MCNC: 37.1 MG/DL
CRP SERPL-MCNC: <3 MG/L
EGFRCR SERPLBLD CKD-EPI 2021: 75 ML/MIN/1.73M2
GLUCOSE SERPL-MCNC: 82 MG/DL (ref 70–99)
HCO3 SERPL-SCNC: 28 MMOL/L (ref 22–29)
MICROALBUMIN UR-MCNC: <12 MG/L
MICROALBUMIN/CREAT UR: NORMAL MG/G{CREAT}
POTASSIUM SERPL-SCNC: 3.1 MMOL/L (ref 3.4–5.3)
SODIUM SERPL-SCNC: 136 MMOL/L (ref 135–145)

## 2024-08-31 ENCOUNTER — MYC MEDICAL ADVICE (OUTPATIENT)
Dept: INTERNAL MEDICINE | Facility: CLINIC | Age: 59
End: 2024-08-31
Payer: COMMERCIAL

## 2024-08-31 DIAGNOSIS — E87.6 HYPOKALEMIA: Primary | ICD-10-CM

## 2024-08-31 LAB
GAMMA INTERFERON BACKGROUND BLD IA-ACNC: 0 IU/ML
M TB IFN-G BLD-IMP: NEGATIVE
M TB IFN-G CD4+ BCKGRND COR BLD-ACNC: 10 IU/ML
MITOGEN IGNF BCKGRD COR BLD-ACNC: 0.01 IU/ML
MITOGEN IGNF BCKGRD COR BLD-ACNC: 0.05 IU/ML
QUANTIFERON MITOGEN: 10 IU/ML
QUANTIFERON NIL TUBE: 0 IU/ML
QUANTIFERON TB1 TUBE: 0.05 IU/ML
QUANTIFERON TB2 TUBE: 0.01

## 2024-08-31 RX ORDER — POTASSIUM CHLORIDE 1500 MG/1
20 TABLET, EXTENDED RELEASE ORAL DAILY
Qty: 90 TABLET | Refills: 1 | Status: SHIPPED | OUTPATIENT
Start: 2024-08-31

## 2024-11-09 ENCOUNTER — HEALTH MAINTENANCE LETTER (OUTPATIENT)
Age: 59
End: 2024-11-09

## 2024-11-15 DIAGNOSIS — E66.9 OBESITY, UNSPECIFIED: ICD-10-CM

## 2024-11-16 RX ORDER — SEMAGLUTIDE 2.4 MG/.75ML
INJECTION, SOLUTION SUBCUTANEOUS
Qty: 3 ML | Refills: 0 | Status: SHIPPED | OUTPATIENT
Start: 2024-11-16

## 2024-12-17 DIAGNOSIS — E66.9 OBESITY, UNSPECIFIED: ICD-10-CM

## 2024-12-19 RX ORDER — SEMAGLUTIDE 2.4 MG/.75ML
INJECTION, SOLUTION SUBCUTANEOUS
Qty: 3 ML | Refills: 0 | Status: SHIPPED | OUTPATIENT
Start: 2024-12-19

## 2025-01-30 ENCOUNTER — PATIENT OUTREACH (OUTPATIENT)
Dept: CARE COORDINATION | Facility: CLINIC | Age: 60
End: 2025-01-30
Payer: COMMERCIAL

## 2025-02-13 ENCOUNTER — PATIENT OUTREACH (OUTPATIENT)
Dept: CARE COORDINATION | Facility: CLINIC | Age: 60
End: 2025-02-13
Payer: COMMERCIAL

## 2025-02-25 ENCOUNTER — OFFICE VISIT (OUTPATIENT)
Dept: PODIATRY | Facility: CLINIC | Age: 60
End: 2025-02-25
Payer: COMMERCIAL

## 2025-02-25 ENCOUNTER — ANCILLARY PROCEDURE (OUTPATIENT)
Dept: GENERAL RADIOLOGY | Facility: CLINIC | Age: 60
End: 2025-02-25
Attending: PODIATRIST
Payer: COMMERCIAL

## 2025-02-25 ENCOUNTER — MYC MEDICAL ADVICE (OUTPATIENT)
Dept: PODIATRY | Facility: CLINIC | Age: 60
End: 2025-02-25

## 2025-02-25 VITALS
BODY MASS INDEX: 36.8 KG/M2 | SYSTOLIC BLOOD PRESSURE: 118 MMHG | DIASTOLIC BLOOD PRESSURE: 78 MMHG | HEIGHT: 66 IN | WEIGHT: 229 LBS

## 2025-02-25 DIAGNOSIS — M77.41 METATARSALGIA, RIGHT FOOT: ICD-10-CM

## 2025-02-25 DIAGNOSIS — M06.041 RHEUMATOID ARTHRITIS INVOLVING BOTH HANDS WITH NEGATIVE RHEUMATOID FACTOR (H): ICD-10-CM

## 2025-02-25 DIAGNOSIS — M06.042 RHEUMATOID ARTHRITIS INVOLVING BOTH HANDS WITH NEGATIVE RHEUMATOID FACTOR (H): ICD-10-CM

## 2025-02-25 DIAGNOSIS — M21.41 BILATERAL PES PLANUS: Primary | ICD-10-CM

## 2025-02-25 DIAGNOSIS — M21.42 BILATERAL PES PLANUS: ICD-10-CM

## 2025-02-25 DIAGNOSIS — M62.461 GASTROCNEMIUS EQUINUS, RIGHT: ICD-10-CM

## 2025-02-25 DIAGNOSIS — M21.42 BILATERAL PES PLANUS: Primary | ICD-10-CM

## 2025-02-25 DIAGNOSIS — M72.2 PLANTAR FASCIITIS, RIGHT: ICD-10-CM

## 2025-02-25 DIAGNOSIS — M21.41 BILATERAL PES PLANUS: ICD-10-CM

## 2025-02-25 PROCEDURE — 73630 X-RAY EXAM OF FOOT: CPT | Mod: TC | Performed by: RADIOLOGY

## 2025-02-25 RX ORDER — BUPIVACAINE HYDROCHLORIDE 5 MG/ML
2 INJECTION, SOLUTION EPIDURAL; INTRACAUDAL
Status: COMPLETED | OUTPATIENT
Start: 2025-02-25 | End: 2025-02-25

## 2025-02-25 RX ORDER — TRIAMCINOLONE ACETONIDE 40 MG/ML
40 INJECTION, SUSPENSION INTRA-ARTICULAR; INTRAMUSCULAR
Status: COMPLETED | OUTPATIENT
Start: 2025-02-25 | End: 2025-02-25

## 2025-02-25 RX ADMIN — BUPIVACAINE HYDROCHLORIDE 2 ML: 5 INJECTION, SOLUTION EPIDURAL; INTRACAUDAL at 09:01

## 2025-02-25 RX ADMIN — TRIAMCINOLONE ACETONIDE 40 MG: 40 INJECTION, SUSPENSION INTRA-ARTICULAR; INTRAMUSCULAR at 09:01

## 2025-02-25 NOTE — PATIENT INSTRUCTIONS
Thank you for choosing [facility] Podiatry / Foot & Ankle Surgery!    DR CHING'S CLINIC:  [facility]   [address]   [place] [address]  [phone]    (Tues, Wed, Thur am, Fri pm)     [facility]  [address], [place] [address]  [phone]  (Every other Friday morning)    [facility]  [address] [place] [id number] [phone]  (Every other Friday)     TRIAGE LINE: [phone]  APPOINTMENTS: [phone]  RADIOLOGY: [phone]  SET UP SURGERY: [phone]  PHYSICAL THERAPY: [phone]   FAX NUMBER: [phone]  BILLING QUESTIONS: [phone]       Follow up: As needed       PLANTAR FASCIITIS  Plantar fasciitis is often referred to as heel spurs or heel pain. Plantar fasciitis is a very common problem that affects people of all foot shapes, age, weight and activity level. Pain may be in the arch or on the weight-bearing surface of the heel. The pain may come on without injury or identifiable cause. Pain is generally present when first getting out of bed in the morning or up from a seated break.     CAUSES  The plantar fascia is a dense fibrous band of tissue that stretches across the bottom surface of the foot. The fascia helps support the foot muscles and arch. Plantar fasciitis is thought to be caused by mechanical strain or overload. Frequent walking without shoes or wearing unsupportive shoes is thought to cause structural overload and ultimately inflammation of the plantar fascia. Some people have heel spurs that can be seen on x-ray. The heel spur is actually a minor component of plantar fascitis and is largely ignored.       SELF TREATMENT   The easiest solution is to stop walking around your home without shoes. Plantar fasciitis is largely a shoe problem. Shoes are either not being worn often enough or your current shoes are inadequate for your weight, foot structure or activity level. The majority  of shoes on the market today are not sufficient to resist development of plantar fasciitis or to promote healing. Assume that your current shoes are inadequate and will need to be replaced. Even high quality shoes wear out with 6 months to one year of frequent use. Weight loss is another option. Losing ten pounds in the next two months may be enough to resolve the problem. Ice applied to the area of pain two to three times per day for ten minutes each session can be very helpful. Warm foot soaks in epsom salts can also relieve pain. This should continue until the problem resolves. Achilles tendon stretching is essential. Stretch multiple times daily to promote healing and to prevent recurrence in the future. Over all stretching of the body is helpful as well such as the calves, thighs and lower back. Normally when one area of the body is tight, other areas are too. Gentle Yoga can be good for this.     Over the counter topical anti inflammatories can be helpful such as biofreeze, bengay, salon pas, ect...  Oral ibuprofen or aleve is recommended as well to try to calm down inflammation.     Night splints can be helpful to gradually stretch the foot at night as a lot of pain is when you get up in the morning. Taking a towel or thera band and stretching the foot back multiple times before you get ou of bed can be beneficial as well.     MEDICAL TREATMENT  Medical treatments often include custom arch supports, cortisone injections, physical therapy, splints to be worn in bed, prescription medications and surgery. The home treatments listed above will be necessary regardless of these advanced medical treatments. Surgery is rarely needed but is very helpful in selected cases.     PROGNOSIS  Plantar fasciitis can last from one day to a lifetime. Some people get intermittent fascitis that is very short-lived. Others suffer daily for years. Excessive body weight, frequent bare foot walking, long hours on the feet, inadequate  shoes, predisposing foot structures and excessive activity such as running are all potential issues that lead to chronic and/or recurring plantar fascitis. Having plantar fasciitis means that you are forever prone to this problem and will require modification of some of the above factors. Most people seek treatment within one to four months. Healing usually requires a similar one to four month time frame. Healing time is relative to the amount of effort spent treating the problem.   Plantar fasciitis is highly recurrent. Risk factors often continue, including return to bare foot walking, inadequate shoes, excessive body weight, excessive activities, etc. Your life style and foot structure may predispose you to recurrent plantar fasciitis. A daily prevention regimen can be very helpful. Ongoing use of shoe inserts, careful attention to appropriate shoes, daily Achilles stretching, etc. may prevent recurrence. Prompt attention at the earliest warning signs of heel pain can resolve the problem in as short as a few days.     EXERCISES  Stair Exercise: Step on the stairs with the ball of your foot and hold your position for at least 15 seconds, then slowly step down with the heels of your foot. You can do this daily and as often as you want.   Picking the Towel: Sit comfortably and then pick the towel up with your toes. You can use any object other than a towel as long as the material can be soft and you can pick it up with your toes.  Rolling the Bottle: Use a small ball or frozen water bottle and then roll it around with your foot.   Flex the Toes: Sit comfortably and then flex your toes by pointing it towards the floor or towards your body. This will relax and flex your foot and exercise your plantar fascia, the calf, and the Achilles tendon. The inability of the foot to stretch often causes the bunching up of the plantar fascia area leading to the pain.  Calf/Achilles Stretching: Lay on you back and raise one foot,  then point your toes towards the floor. See photo below:               Hold each stretch for 10 seconds. Stretch 10 times per set, three sets per day. Morning, afternoon and evening. If your heel pain is very severe in the morning, consider doing the first set of stretches before you get out of bed.      OVER THE COUNTER INSERT RECOMMENDATIONS  SuperFeet   Sofsole Fit Spenco   Power Step   Walk-Fit Arch Cradles     Most of these can be found at your local [employer], [employer], or online.  **A good high quality over the counter insert should cost around $40-$50      [employer] LOCATIONS  [place]  [address] [place] [address]  [phone]   [address]  [phone] [place]  [address]  [phone]   [place]  [address]  [phone]   [place]  [address]  [phone] [place] [address]  [phone]   [place]  [address]  [phone]   [place]  [address] [address]  [phone] [place]  [address]  [phone]        EQUINUS / ACHILLES TENDON LENGTHENING  The Achilles tendon and calf muscles play a critical role in normal foot and ankle function. Tightness of the muscle and tendon complex prohibits the normal amount of ankle flexibility. Inadequate ankle dorsiflexion, also known as equinus, results in compensatory stress throughout the leg and foot.     Unnatural stress on the foot results in arch collapse, foot pronation, plantar fasciitis, pain in the ball of the foot, hammer toes, bunions, arthritis, foot wounds, etc. The knee, hip, and leg muscles may also be affected by ankle stiffness. People with diabetes have additional problems with tightness of the Achilles tendon. The combination of foot numbness and a tight Achilles may lead to pressure sores in the ball of the foot. Bone breakdown through the midfoot may also occur as a complication of equinus.     Initial treatment might involve  stretching exercises. Stretching will keep the muscles loose but you will not likely accomplish making the tendon longer. Daily Achilles stretching is important   nonetheless. Surgical lengthening of the tendon is often required. .   Surgery can be performed in several ways. The tendon can be lengthened at the ankle level (Achilles lengthening) or in the mid calf (Gastroc lengthening). Achilles and gastrocnemius lengthening can be performed as an isolated procedure or in combination with surgical procedures for other conditions.   Lengthening procedures are most commonly performed for treatment of flatfoot deformity, contracture associated with diabetes related forefoot and midfoot ulcers, contracture after injury or stroke and to treat Achilles tendonitis.   The goal of surgery is to make the tendon longer yet still preserve function of the calf muscles. Most surgical patients do not have noticeable weakness once they recover from the surgery.     The main potential hazard of surgery involves tendon rupture during the healing process. Rupture is not common but could potentially occur. The tendon can also become excessively lengthened, especially from the stress of premature walking before the tendon is healed. Tendon adhesion, skin adhesion and nerve irritation or numbness can also occur.    CALF AND ACHILLES TENDON STRECHES   Stretch gently, do not bounce.   Do each set of stretches three times a day while you are having symptoms.   Do each set of stretches once a day after symptoms are relieved.     1. Towel Stretch   Sit on hard surface with one leg straight out in front of you.   Loop a towel around the ball of the foot and pull the towel to your body.   Be sure to keep your leg straight.   You should feel tension in the calf muscle of the straight leg.   Repeat 3 times on each side for at least 15 seconds each.     2. Standing Calf Stretch   Stand about a foot from a wall and extend one leg behind you.   Keep  both feet flat on the floor, toes pointed straight ahead, and the rear knee  straight.   Lean into the wall until you feel tension in the rear leg.   Hold for at least 15 seconds.   Repeat 2 times on each side.     3. Standing Achilles Tendon Stretch   Get into position of Standing Calf Stretch.   Lower the hips downward as you slightly bend the knee of the rear leg.   Keep both feet flat on the floor and toes straight ahead.   Hold for at least 15 seconds.   Repeat 2 times on each side.    [facility] [place] [facility] LOCATIONS  [facility]  [address]  [place] [address]  Phone: [phone]  Fax: [phone] [facility]   [address]  [place] [address]  Phone: [phone]  Fax: [phone]   [facility]  [address]  [place] [address]  Phone: [phone]  Fax: [phone] [facility]  [address]  [place] [address]  Phone: [phone]   Fax: [phone]   * Please call any location listed to make an appointment for a casting/fitting. Your referral was sent to their central office and they will all have the order on file.

## 2025-02-25 NOTE — TELEPHONE ENCOUNTER
Mike Swain,    Your x-ray results did show a small little extra bone under your fifth metatarsal head that could be causing irritation.  You do have a plantar as well as a posterior heel spur as well.  Again usually with the plantar heel spurs we do not tend to do much with them.  Hopefully the injection is working.  If pain continues again surgery would likely be the next option if the injection and inserts are not working.    As for little extra bone in the bottom of the the fifth toe area we could go in and remove that surgically as well to.  It would be a same-day procedure and you would be minimal weightbearing in a boot for about 2 to 3 weeks until the stitches came out.    Mayda Srivastava DPM

## 2025-02-25 NOTE — PROGRESS NOTES
PATIENT HISTORY:   Amita Finley is a 59 year old female who presents to clinic for pain to the right foot under the heel and left fifth toe also at the Achilles tendon.  Has been going on for more than a year but is getting worse.  Pain can be 7 out of 10 at its worst.  Is a throbbing pain.  Worse with standing or walking.  She has been icing and using ibuprofen and stretching and inserts but it has not really helped.  She is wondering what is causing the problem and what could be done for it.    Review of Systems:  Patient denies fever, chills, rash, wound, stiffness, limping, numbness, weakness, heart burn, blood in stool, chest pain with activity, calf pain when walking, shortness of breath with activity, chronic cough, easy bleeding/bruising, swelling of ankles, excessive thirst, fatigue, depression, anxiety.  Patient admits to limping at times..     PAST MEDICAL HISTORY:   Past Medical History:   Diagnosis Date    Antiphospholipid antibodies positive 12/14/2011    Arthritis     Baker's cyst of knee     Hypothyroidism     Obesity         PAST SURGICAL HISTORY:   Past Surgical History:   Procedure Laterality Date    Novasure endometrial ablation  10/2007        MEDICATIONS:   Current Outpatient Medications:     hydrochlorothiazide (HYDRODIURIL) 25 MG tablet, Take 1 tablet (25 mg) by mouth daily, Disp: 90 tablet, Rfl: 4    levothyroxine (SYNTHROID/LEVOTHROID) 150 MCG tablet, TAKE TWO TABLETS BY MOUTH ON MONDAYS AND FRIDAYS, AND TAKE ONE TABLET BY MOUTH ALL OTHER DAYS., Disp: 115 tablet, Rfl: 4    potassium chloride tejas ER (KLOR-CON M20) 20 MEQ CR tablet, Take 1 tablet (20 mEq) by mouth daily., Disp: 90 tablet, Rfl: 1    rosuvastatin (CRESTOR) 5 MG tablet, Take 1 tablet (5 mg) by mouth daily, Disp: 90 tablet, Rfl: 4    traZODone (DESYREL) 100 MG tablet, TAKE ONE TO TWO TABLETS BY MOUTH NIGHTLY AS NEEDED FOR SLEEP, Disp: 180 tablet, Rfl: 4    WEGOVY 2.4 MG/0.75ML pen, INJECT 2.4 MG UNDER THE SKIN ONCE A  WEEK.   -- FOR FURTHER REFILLS PATIENT NEEDS APPOINTMENT, Disp: 3 mL, Rfl: 0     ALLERGIES:    Allergies   Allergen Reactions    Ciprofloxacin Other (See Comments) and Anaphylaxis     Tongue swelling    Amoxicillin-Pot Clavulanate Other (See Comments)     Burning sensation on hands        SOCIAL HISTORY:   Social History     Socioeconomic History    Marital status:      Spouse name: Not on file    Number of children: 2    Years of education: Not on file    Highest education level: Not on file   Occupational History    Occupation: nurse     Employer: [employer]     Comment: [employer]   Tobacco Use    Smoking status: Former     Current packs/day: 0.00     Average packs/day: 1.5 packs/day for 20.0 years (30.0 ttl pk-yrs)     Types: Cigarettes     Start date: 1976     Quit date: 1996     Years since quittin.1     Passive exposure: Past    Smokeless tobacco: Former     Quit date: 1987   Vaping Use    Vaping status: Never Used   Substance and Sexual Activity    Alcohol use: Yes     Alcohol/week: 3.0 standard drinks of alcohol     Types: 3 Standard drinks or equivalent per week     Comment: wine on the weekend    Drug use: No    Sexual activity: Yes     Partners: Male     Birth control/protection: Surgical   Other Topics Concern    Parent/sibling w/ CABG, MI or angioplasty before 65F 55M? Not Asked   Social History Narrative    Not on file     Social Drivers of Health     Financial Resource Strain: Low Risk  (3/1/2024)    Financial Resource Strain     Within the past 12 months, have you or your family members you live with been unable to get utilities (heat, electricity) when it was really needed?: No   Food Insecurity: Low Risk  (3/1/2024)    Food Insecurity     Within the past 12 months, did you worry that your food would run out before you got money to buy more?: No     Within the past 12 months, did the food you bought just not last and you didn t have money to get more?:  No   Transportation Needs: Low Risk  (3/1/2024)    Transportation Needs     Within the past 12 months, has lack of transportation kept you from medical appointments, getting your medicines, non-medical meetings or appointments, work, or from getting things that you need?: No   Physical Activity: Unknown (3/1/2024)    Exercise Vital Sign     Days of Exercise per Week: 2 days     Minutes of Exercise per Session: Not on file   Stress: Stress Concern Present (3/1/2024)    [employer] - Occupational Stress Questionnaire     Feeling of Stress : Rather much   Social Connections: Unknown (3/1/2024)    Social Connection and Isolation Panel [NHANES]     Frequency of Communication with Friends and Family: Not on file     Frequency of Social Gatherings with Friends and Family: Once a week     Attends Yazdanism Services: Not on file     Active Member of Clubs or Organizations: Not on file     Attends Club or Organization Meetings: Not on file     Marital Status: Not on file   Interpersonal Safety: Low Risk  (3/1/2024)    Interpersonal Safety     Do you feel physically and emotionally safe where you currently live?: Yes     Within the past 12 months, have you been hit, slapped, kicked or otherwise physically hurt by someone?: No     Within the past 12 months, have you been humiliated or emotionally abused in other ways by your partner or ex-partner?: No   Housing Stability: Low Risk  (3/1/2024)    Housing Stability     Do you have housing? : Yes     Are you worried about losing your housing?: No        FAMILY HISTORY:   Family History   Problem Relation Age of Onset    Gastrointestinal Disease Mother         in 60's    Hypertension Mother     C.A.D. Father     Diabetes Father     Cardiovascular Father         First MI age 66    Glaucoma Father     Hypertension Father     Squamous cell carcinoma Father     Cancer Maternal Grandmother     Melanoma Brother     No Known Problems Sister     Thyroid Disease  "Daughter     Hypothyroidism Daughter     Macular Degeneration No family hx of         EXAM:Vitals: Ht 1.67 m (5' 5.75\")   Wt 103.9 kg (229 lb)   BMI 37.24 kg/m    BMI= Body mass index is 37.24 kg/m .      General appearance: Patient is alert and fully cooperative with history & exam.  No sign of distress is noted during the visit.     Psychiatric: Affect is pleasant & appropriate.  Patient appears motivated to improve health.     Respiratory: Breathing is regular & unlabored while sitting.     HEENT: Hearing is intact to spoken word.  Speech is clear.  No gross evidence of visual impairment that would impact ambulation.     Dermatologic: Skin is intact to both lower extremities without significant lesions, rash or abrasion.  No paronychia or evidence of soft tissue infection is noted.     Vascular: DP & PT pulses are intact & regular bilaterally.  No significant edema or varicosities noted.  CFT and skin temperature is normal to both lower extremities.     Neurologic: Lower extremity sensation is intact to light touch.  No evidence of weakness or contracture in the lower extremities.  No evidence of neuropathy.     Musculoskeletal: Patient is ambulatory without assistive device or brace.  Decreased arch height bilaterally.  Pain on palpation to the plantar aspect of the right heel as well as the plantar aspect of the right fifth metatarsal head.  Metatarsal heads 1 through 5 are more prominent plantarly.  Decreased dorsiflexion of right ankle and tightness of the Achilles tendon that decreases with bending of the knee.    Radiographs: right foot xray -   I personally reviewed the xrays.  Decreased calcaneal inclination angle.  Plantar posterior heel spurs are noted.  No fractures are noted.  Degenerative changes noted to the midfoot.     ASSESSMENT:    Rheumatoid arthritis involving both hands with negative rheumatoid factor (H)  Bilateral pes planus  Plantar fasciitis, right  Gastrocnemius equinus, " right  Metatarsalgia, right foot     Medical Decision Making/Plan:  [facility] in [facility].  The potential causes and nature of plantar fasciitis were discussed with the patient.  We reviewed the natural history/prognosis of the condition and risks if left untreated.  These include chronic pain, other sites of pain due to gait changes, and potential plantar fascial rupture.      We discussed possible causes of the condition as it relates to the patients specific situation.      Conservative treatment options were reviewed:  appropriate shoes, avoidance of barefoot walking, inserts/orthoses, stretching, ice, massage, immobilization and NSAIDs.     We also reviewed the options of injection therapy and surgery.  However, it was made clear that surgery is only considered when conservative therapy fails.  The risks and benefits of injection therapy, and surgery were discussed.    Reviewed and discussed causes of tendonitis.  We discussed treatments such as immobiliation, icing, stretching, heel lifts, orthotics, physical therapy, MRI.      At this time would recommend custom orthotics to help with more padding to the heel and ball of the foot.  Also recommend injection to try to help with pain to the plantar aspect of the heel.  Recommend physical therapy for the posterior heel tendinitis.  We talked about going in and lengthening the Achilles tendon and the plantar fascia if pain continues.  She was also fitted with an ankle brace to wear to help protect the plantar fascia for the next week.      Patient risk factor: Patient is at low risk for infection.     All questions were answered to patients satisfaction and they will call with further questions or concerns.    PRocedurE: Medium Joint Injection/Arthrocentesis    Date/Time: 2/25/2025 9:01 AM    Performed by: Mayda Srivastava DPM, Podiatry/Foot and Ankle Surgery  Authorized by: Mayda Srivastava DPM, Podiatry/Foot and Ankle Surgery    Indications:   Pain  Needle Size:  25 G  Guidance: surface landmarks    Medications:  40 mg triamcinolone 40 MG/ML; 2 mL BUPivacaine (PF) 0.5 %  Outcome:  Tolerated well, no immediate complications  Procedure discussed: discussed risks, benefits, and alternatives    Consent Given by:  Patient  Timeout: timeout called immediately prior to procedure    Prep: patient was prepped and draped in usual sterile fashion             Mayda Srivastava DPM, Podiatry/Foot and Ankle Surgery

## 2025-02-25 NOTE — LETTER
2/25/2025      Amita Finley  [address]  [place] [address]      Dear Colleague,    Thank you for referring your patient, Amita Finley, to the [facility]. Please see a copy of my visit note below.    PATIENT HISTORY:   Amita Finley is a 59 year old female who presents to clinic for pain to the right foot under the heel and left fifth toe also at the Achilles tendon.  Has been going on for more than a year but is getting worse.  Pain can be 7 out of 10 at its worst.  Is a throbbing pain.  Worse with standing or walking.  She has been icing and using ibuprofen and stretching and inserts but it has not really helped.  She is wondering what is causing the problem and what could be done for it.    Review of Systems:  Patient denies fever, chills, rash, wound, stiffness, limping, numbness, weakness, heart burn, blood in stool, chest pain with activity, calf pain when walking, shortness of breath with activity, chronic cough, easy bleeding/bruising, swelling of ankles, excessive thirst, fatigue, depression, anxiety.  Patient admits to limping at times..     PAST MEDICAL HISTORY:   Past Medical History:   Diagnosis Date     Antiphospholipid antibodies positive 12/14/2011     Arthritis      Baker's cyst of knee      Hypothyroidism      Obesity         PAST SURGICAL HISTORY:   Past Surgical History:   Procedure Laterality Date     Novasure endometrial ablation  10/2007        MEDICATIONS:   Current Outpatient Medications:      hydrochlorothiazide (HYDRODIURIL) 25 MG tablet, Take 1 tablet (25 mg) by mouth daily, Disp: 90 tablet, Rfl: 4     levothyroxine (SYNTHROID/LEVOTHROID) 150 MCG tablet, TAKE TWO TABLETS BY MOUTH ON MONDAYS AND FRIDAYS, AND TAKE ONE TABLET BY MOUTH ALL OTHER DAYS., Disp: 115 tablet, Rfl: 4     potassium chloride tejas ER (KLOR-CON M20) 20 MEQ CR tablet, Take 1 tablet (20 mEq) by mouth daily., Disp: 90 tablet, Rfl: 1     rosuvastatin (CRESTOR)  5 MG tablet, Take 1 tablet (5 mg) by mouth daily, Disp: 90 tablet, Rfl: 4     traZODone (DESYREL) 100 MG tablet, TAKE ONE TO TWO TABLETS BY MOUTH NIGHTLY AS NEEDED FOR SLEEP, Disp: 180 tablet, Rfl: 4     WEGOVY 2.4 MG/0.75ML pen, INJECT 2.4 MG UNDER THE SKIN ONCE A WEEK.   -- FOR FURTHER REFILLS PATIENT NEEDS APPOINTMENT, Disp: 3 mL, Rfl: 0     ALLERGIES:    Allergies   Allergen Reactions     Ciprofloxacin Other (See Comments) and Anaphylaxis     Tongue swelling     Amoxicillin-Pot Clavulanate Other (See Comments)     Burning sensation on hands        SOCIAL HISTORY:   Social History     Socioeconomic History     Marital status:      Spouse name: Not on file     Number of children: 2     Years of education: Not on file     Highest education level: Not on file   Occupational History     Occupation: nurse     Employer: [employer]     Comment: [employer]   Tobacco Use     Smoking status: Former     Current packs/day: 0.00     Average packs/day: 1.5 packs/day for 20.0 years (30.0 ttl pk-yrs)     Types: Cigarettes     Start date: 1976     Quit date: 1996     Years since quittin.1     Passive exposure: Past     Smokeless tobacco: Former     Quit date: 1987   Vaping Use     Vaping status: Never Used   Substance and Sexual Activity     Alcohol use: Yes     Alcohol/week: 3.0 standard drinks of alcohol     Types: 3 Standard drinks or equivalent per week     Comment: wine on the weekend     Drug use: No     Sexual activity: Yes     Partners: Male     Birth control/protection: Surgical   Other Topics Concern     Parent/sibling w/ CABG, MI or angioplasty before 65F 55M? Not Asked   Social History Narrative     Not on file     Social Drivers of Health     Financial Resource Strain: Low Risk  (3/1/2024)    Financial Resource Strain      Within the past 12 months, have you or your family members you live with been unable to get utilities (heat, electricity) when it was really needed?: No    Food Insecurity: Low Risk  (3/1/2024)    Food Insecurity      Within the past 12 months, did you worry that your food would run out before you got money to buy more?: No      Within the past 12 months, did the food you bought just not last and you didn t have money to get more?: No   Transportation Needs: Low Risk  (3/1/2024)    Transportation Needs      Within the past 12 months, has lack of transportation kept you from medical appointments, getting your medicines, non-medical meetings or appointments, work, or from getting things that you need?: No   Physical Activity: Unknown (3/1/2024)    Exercise Vital Sign      Days of Exercise per Week: 2 days      Minutes of Exercise per Session: Not on file   Stress: Stress Concern Present (3/1/2024)    [place] [employer] - Occupational Stress Questionnaire      Feeling of Stress : Rather much   Social Connections: Unknown (3/1/2024)    Social Connection and Isolation Panel [NHANES]      Frequency of Communication with Friends and Family: Not on file      Frequency of Social Gatherings with Friends and Family: Once a week      Attends [employer] Services: Not on file      Active Member of Clubs or Organizations: Not on file      Attends Club or Organization Meetings: Not on file      Marital Status: Not on file   Interpersonal Safety: Low Risk  (3/1/2024)    Interpersonal Safety      Do you feel physically and emotionally safe where you currently live?: Yes      Within the past 12 months, have you been hit, slapped, kicked or otherwise physically hurt by someone?: No      Within the past 12 months, have you been humiliated or emotionally abused in other ways by your partner or ex-partner?: No   Housing Stability: Low Risk  (3/1/2024)    Housing Stability      Do you have housing? : Yes      Are you worried about losing your housing?: No        FAMILY HISTORY:   Family History   Problem Relation Age of Onset     Gastrointestinal Disease Mother          "in 60's     Hypertension Mother      C.A.D. Father      Diabetes Father      Cardiovascular Father         First MI age 66     Glaucoma Father      Hypertension Father      Squamous cell carcinoma Father      Cancer Maternal Grandmother      Melanoma Brother      No Known Problems Sister      Thyroid Disease Daughter      Hypothyroidism Daughter      Macular Degeneration No family hx of         EXAM:Vitals: Ht 1.67 m (5' 5.75\")   Wt 103.9 kg (229 lb)   BMI 37.24 kg/m    BMI= Body mass index is 37.24 kg/m .      General appearance: Patient is alert and fully cooperative with history & exam.  No sign of distress is noted during the visit.     Psychiatric: Affect is pleasant & appropriate.  Patient appears motivated to improve health.     Respiratory: Breathing is regular & unlabored while sitting.     HEENT: Hearing is intact to spoken word.  Speech is clear.  No gross evidence of visual impairment that would impact ambulation.     Dermatologic: Skin is intact to both lower extremities without significant lesions, rash or abrasion.  No paronychia or evidence of soft tissue infection is noted.     Vascular: DP & PT pulses are intact & regular bilaterally.  No significant edema or varicosities noted.  CFT and skin temperature is normal to both lower extremities.     Neurologic: Lower extremity sensation is intact to light touch.  No evidence of weakness or contracture in the lower extremities.  No evidence of neuropathy.     Musculoskeletal: Patient is ambulatory without assistive device or brace.  Decreased arch height bilaterally.  Pain on palpation to the plantar aspect of the right heel as well as the plantar aspect of the right fifth metatarsal head.  Metatarsal heads 1 through 5 are more prominent plantarly.  Decreased dorsiflexion of right ankle and tightness of the Achilles tendon that decreases with bending of the knee.    Radiographs: right foot xray -   I personally reviewed the xrays.  Decreased calcaneal " inclination angle.  Plantar posterior heel spurs are noted.  No fractures are noted.  Degenerative changes noted to the midfoot.     ASSESSMENT:    Rheumatoid arthritis involving both hands with negative rheumatoid factor (H)  Bilateral pes planus  Plantar fasciitis, right  Gastrocnemius equinus, right  Metatarsalgia, right foot     Medical Decision Making/Plan:  Reviewed patient's chart in [facility].  The potential causes and nature of plantar fasciitis were discussed with the patient.  We reviewed the natural history/prognosis of the condition and risks if left untreated.  These include chronic pain, other sites of pain due to gait changes, and potential plantar fascial rupture.      We discussed possible causes of the condition as it relates to the patients specific situation.      Conservative treatment options were reviewed:  appropriate shoes, avoidance of barefoot walking, inserts/orthoses, stretching, ice, massage, immobilization and NSAIDs.     We also reviewed the options of injection therapy and surgery.  However, it was made clear that surgery is only considered when conservative therapy fails.  The risks and benefits of injection therapy, and surgery were discussed.    Reviewed and discussed causes of tendonitis.  We discussed treatments such as immobiliation, icing, stretching, heel lifts, orthotics, physical therapy, MRI.      At this time would recommend custom orthotics to help with more padding to the heel and ball of the foot.  Also recommend injection to try to help with pain to the plantar aspect of the heel.  Recommend physical therapy for the posterior heel tendinitis.  We talked about going in and lengthening the Achilles tendon and the plantar fascia if pain continues.  She was also fitted with an ankle brace to wear to help protect the plantar fascia for the next week.      Patient risk factor: Patient is at low risk for infection.     All questions were answered to patients satisfaction and  they will call with further questions or concerns.    PRocedurE: Medium Joint Injection/Arthrocentesis    Date/Time: 2/25/2025 9:01 AM    Performed by: Mayda Srivastava DPM, Podiatry/Foot and Ankle Surgery  Authorized by: Mayda Srivastava DPM, Podiatry/Foot and Ankle Surgery    Indications:  Pain  Needle Size:  25 G  Guidance: surface landmarks    Medications:  40 mg triamcinolone 40 MG/ML; 2 mL BUPivacaine (PF) 0.5 %  Outcome:  Tolerated well, no immediate complications  Procedure discussed: discussed risks, benefits, and alternatives    Consent Given by:  Patient  Timeout: timeout called immediately prior to procedure    Prep: patient was prepped and draped in usual sterile fashion             Mayda Srivastava DPM, Podiatry/Foot and Ankle Surgery      Again, thank you for allowing me to participate in the care of your patient.        Sincerely,        Mayda Srivastava DPM, Podiatry/Foot and Ankle Surgery    Electronically signed

## 2025-02-28 ENCOUNTER — HOSPITAL ENCOUNTER (OUTPATIENT)
Dept: MAMMOGRAPHY | Facility: CLINIC | Age: 60
Discharge: HOME | End: 2025-02-28
Attending: INTERNAL MEDICINE | Admitting: INTERNAL MEDICINE
Payer: COMMERCIAL

## 2025-02-28 DIAGNOSIS — Z12.31 VISIT FOR SCREENING MAMMOGRAM: ICD-10-CM

## 2025-02-28 PROCEDURE — 77063 BREAST TOMOSYNTHESIS BI: CPT

## 2025-02-28 PROCEDURE — 77067 SCR MAMMO BI INCL CAD: CPT

## 2025-02-28 SDOH — HEALTH STABILITY: PHYSICAL HEALTH: ON AVERAGE, HOW MANY MINUTES DO YOU ENGAGE IN EXERCISE AT THIS LEVEL?: 30 MIN

## 2025-02-28 SDOH — HEALTH STABILITY: PHYSICAL HEALTH: ON AVERAGE, HOW MANY DAYS PER WEEK DO YOU ENGAGE IN MODERATE TO STRENUOUS EXERCISE (LIKE A BRISK WALK)?: 2 DAYS

## 2025-02-28 ASSESSMENT — SOCIAL DETERMINANTS OF HEALTH (SDOH): HOW OFTEN DO YOU GET TOGETHER WITH FRIENDS OR RELATIVES?: ONCE A WEEK

## 2025-03-03 ENCOUNTER — OFFICE VISIT (OUTPATIENT)
Dept: FAMILY MEDICINE | Facility: CLINIC | Age: 60
End: 2025-03-03
Payer: COMMERCIAL

## 2025-03-03 VITALS
WEIGHT: 237 LBS | TEMPERATURE: 97.6 F | RESPIRATION RATE: 11 BRPM | BODY MASS INDEX: 38.09 KG/M2 | DIASTOLIC BLOOD PRESSURE: 80 MMHG | HEIGHT: 66 IN | SYSTOLIC BLOOD PRESSURE: 116 MMHG | HEART RATE: 75 BPM | OXYGEN SATURATION: 98 %

## 2025-03-03 DIAGNOSIS — F33.1 MODERATE EPISODE OF RECURRENT MAJOR DEPRESSIVE DISORDER (H): ICD-10-CM

## 2025-03-03 DIAGNOSIS — G47.00 INSOMNIA, UNSPECIFIED TYPE: ICD-10-CM

## 2025-03-03 DIAGNOSIS — E66.01 MORBID OBESITY (H): ICD-10-CM

## 2025-03-03 DIAGNOSIS — M06.041 RHEUMATOID ARTHRITIS INVOLVING BOTH HANDS WITH NEGATIVE RHEUMATOID FACTOR (H): ICD-10-CM

## 2025-03-03 DIAGNOSIS — R60.0 EDEMA OF BOTH LEGS: ICD-10-CM

## 2025-03-03 DIAGNOSIS — E03.9 HYPOTHYROIDISM, UNSPECIFIED TYPE: ICD-10-CM

## 2025-03-03 DIAGNOSIS — F41.9 ANXIETY: ICD-10-CM

## 2025-03-03 DIAGNOSIS — F51.01 PRIMARY INSOMNIA: ICD-10-CM

## 2025-03-03 DIAGNOSIS — E78.5 HYPERLIPIDEMIA LDL GOAL <160: ICD-10-CM

## 2025-03-03 DIAGNOSIS — Z12.4 CERVICAL CANCER SCREENING: ICD-10-CM

## 2025-03-03 DIAGNOSIS — M06.042 RHEUMATOID ARTHRITIS INVOLVING BOTH HANDS WITH NEGATIVE RHEUMATOID FACTOR (H): ICD-10-CM

## 2025-03-03 DIAGNOSIS — E87.6 HYPOKALEMIA: ICD-10-CM

## 2025-03-03 DIAGNOSIS — Z85.820 HISTORY OF MELANOMA: ICD-10-CM

## 2025-03-03 DIAGNOSIS — Z00.00 ROUTINE GENERAL MEDICAL EXAMINATION AT A HEALTH CARE FACILITY: Primary | ICD-10-CM

## 2025-03-03 PROBLEM — C43.9 MELANOMA OF SKIN (H): Status: ACTIVE | Noted: 2025-03-03

## 2025-03-03 PROBLEM — C43.9 MELANOMA OF SKIN (H): Status: RESOLVED | Noted: 2025-03-03 | Resolved: 2025-03-03

## 2025-03-03 LAB
ANION GAP SERPL CALCULATED.3IONS-SCNC: 11 MMOL/L (ref 7–15)
BUN SERPL-MCNC: 17.8 MG/DL (ref 8–23)
CALCIUM SERPL-MCNC: 9.2 MG/DL (ref 8.8–10.4)
CHLORIDE SERPL-SCNC: 103 MMOL/L (ref 98–107)
CHOLEST SERPL-MCNC: 185 MG/DL
CREAT SERPL-MCNC: 0.94 MG/DL (ref 0.51–0.95)
EGFRCR SERPLBLD CKD-EPI 2021: 70 ML/MIN/1.73M2
FASTING STATUS PATIENT QL REPORTED: YES
FASTING STATUS PATIENT QL REPORTED: YES
GLUCOSE SERPL-MCNC: 105 MG/DL (ref 70–99)
HCO3 SERPL-SCNC: 26 MMOL/L (ref 22–29)
HDLC SERPL-MCNC: 64 MG/DL
LDLC SERPL CALC-MCNC: 101 MG/DL
NONHDLC SERPL-MCNC: 121 MG/DL
POTASSIUM SERPL-SCNC: 4.1 MMOL/L (ref 3.4–5.3)
SODIUM SERPL-SCNC: 140 MMOL/L (ref 135–145)
TRIGL SERPL-MCNC: 99 MG/DL
TSH SERPL DL<=0.005 MIU/L-ACNC: 0.62 UIU/ML (ref 0.3–4.2)

## 2025-03-03 RX ORDER — HYDROCHLOROTHIAZIDE 25 MG/1
25 TABLET ORAL DAILY
Qty: 90 TABLET | Refills: 3 | Status: SHIPPED | OUTPATIENT
Start: 2025-03-03

## 2025-03-03 RX ORDER — LEVOTHYROXINE SODIUM 150 UG/1
TABLET ORAL
Qty: 115 TABLET | Refills: 3 | Status: SHIPPED | OUTPATIENT
Start: 2025-03-03

## 2025-03-03 RX ORDER — SERTRALINE HYDROCHLORIDE 25 MG/1
50 TABLET, FILM COATED ORAL DAILY
Qty: 60 TABLET | Refills: 0 | Status: SHIPPED | OUTPATIENT
Start: 2025-03-03

## 2025-03-03 RX ORDER — TRAZODONE HYDROCHLORIDE 100 MG/1
TABLET ORAL
Qty: 180 TABLET | Refills: 3 | Status: SHIPPED | OUTPATIENT
Start: 2025-03-03

## 2025-03-03 RX ORDER — ROSUVASTATIN CALCIUM 5 MG/1
5 TABLET, COATED ORAL DAILY
Qty: 90 TABLET | Refills: 3 | Status: SHIPPED | OUTPATIENT
Start: 2025-03-03

## 2025-03-03 ASSESSMENT — ANXIETY QUESTIONNAIRES
2. NOT BEING ABLE TO STOP OR CONTROL WORRYING: NEARLY EVERY DAY
7. FEELING AFRAID AS IF SOMETHING AWFUL MIGHT HAPPEN: MORE THAN HALF THE DAYS
5. BEING SO RESTLESS THAT IT IS HARD TO SIT STILL: NOT AT ALL
1. FEELING NERVOUS, ANXIOUS, OR ON EDGE: NEARLY EVERY DAY
GAD7 TOTAL SCORE: 16
IF YOU CHECKED OFF ANY PROBLEMS ON THIS QUESTIONNAIRE, HOW DIFFICULT HAVE THESE PROBLEMS MADE IT FOR YOU TO DO YOUR WORK, TAKE CARE OF THINGS AT HOME, OR GET ALONG WITH OTHER PEOPLE: VERY DIFFICULT
GAD7 TOTAL SCORE: 16
6. BECOMING EASILY ANNOYED OR IRRITABLE: NEARLY EVERY DAY
3. WORRYING TOO MUCH ABOUT DIFFERENT THINGS: MORE THAN HALF THE DAYS

## 2025-03-03 ASSESSMENT — PATIENT HEALTH QUESTIONNAIRE - PHQ9
SUM OF ALL RESPONSES TO PHQ QUESTIONS 1-9: 15
10. IF YOU CHECKED OFF ANY PROBLEMS, HOW DIFFICULT HAVE THESE PROBLEMS MADE IT FOR YOU TO DO YOUR WORK, TAKE CARE OF THINGS AT HOME, OR GET ALONG WITH OTHER PEOPLE: VERY DIFFICULT
5. POOR APPETITE OR OVEREATING: NEARLY EVERY DAY
SUM OF ALL RESPONSES TO PHQ QUESTIONS 1-9: 15

## 2025-03-03 ASSESSMENT — PAIN SCALES - GENERAL: PAINLEVEL_OUTOF10: NO PAIN (0)

## 2025-03-03 NOTE — PATIENT INSTRUCTIONS
Patient Education   Preventive Care Advice   This is general advice given by our system to help you stay healthy. However, your care team may have specific advice just for you. Please talk to your care team about your preventive care needs.  Nutrition  Eat 5 or more servings of fruits and vegetables each day.  Try wheat bread, brown rice and whole grain pasta (instead of white bread, rice, and pasta).  Get enough calcium and vitamin D. Check the label on foods and aim for 100% of the RDA (recommended daily allowance).  Lifestyle  Exercise at least 150 minutes each week  (30 minutes a day, 5 days a week).  Do muscle strengthening activities 2 days a week. These help control your weight and prevent disease.  No smoking.  Wear sunscreen to prevent skin cancer.  Have a dental exam and cleaning every 6 months.  Yearly exams  See your health care team every year to talk about:  Any changes in your health.  Any medicines your care team has prescribed.  Preventive care, family planning, and ways to prevent chronic diseases.  Shots (vaccines)   HPV shots (up to age 26), if you've never had them before.  Hepatitis B shots (up to age 59), if you've never had them before.  COVID-19 shot: Get this shot when it's due.  Flu shot: Get a flu shot every year.  Tetanus shot: Get a tetanus shot every 10 years.  Pneumococcal, hepatitis A, and RSV shots: Ask your care team if you need these based on your risk.  Shingles shot (for age 50 and up)  General health tests  Diabetes screening:  Starting at age 35, Get screened for diabetes at least every 3 years.  If you are younger than age 35, ask your care team if you should be screened for diabetes.  Cholesterol test: At age 39, start having a cholesterol test every 5 years, or more often if advised.  Bone density scan (DEXA): At age 50, ask your care team if you should have this scan for osteoporosis (brittle bones).  Hepatitis C: Get tested at least once in your life.  STIs (sexually  transmitted infections)  Before age 24: Ask your care team if you should be screened for STIs.  After age 24: Get screened for STIs if you're at risk. You are at risk for STIs (including HIV) if:  You are sexually active with more than one person.  You don't use condoms every time.  You or a partner was diagnosed with a sexually transmitted infection.  If you are at risk for HIV, ask about PrEP medicine to prevent HIV.  Get tested for HIV at least once in your life, whether you are at risk for HIV or not.  Cancer screening tests  Cervical cancer screening: If you have a cervix, begin getting regular cervical cancer screening tests starting at age 21.  Breast cancer scan (mammogram): If you've ever had breasts, begin having regular mammograms starting at age 40. This is a scan to check for breast cancer.  Colon cancer screening: It is important to start screening for colon cancer at age 45.  Have a colonoscopy test every 10 years (or more often if you're at risk) Or, ask your provider about stool tests like a FIT test every year or Cologuard test every 3 years.  To learn more about your testing options, visit:   .  For help making a decision, visit:   https://bit.ly/db85337.  Prostate cancer screening test: If you have a prostate, ask your care team if a prostate cancer screening test (PSA) at age 55 is right for you.  Lung cancer screening: If you are a current or former smoker ages 50 to 80, ask your care team if ongoing lung cancer screenings are right for you.  For informational purposes only. Not to replace the advice of your health care provider. Copyright   2023 [facility]. All rights reserved. Clinically reviewed by the [facility]. [place] 525392 - REV 01/24.  Learning About Stress  What is stress?     Stress is your body's response to a hard situation. Your body can have a physical, emotional, or mental response. Stress is a fact of life for most people, and it  affects everyone differently. What causes stress for you may not be stressful for someone else.  A lot of things can cause stress. You may feel stress when you go on a job interview, take a test, or run a race. This kind of short-term stress is normal and even useful. It can help you if you need to work hard or react quickly. For example, stress can help you finish an important job on time.  Long-term stress is caused by ongoing stressful situations or events. Examples of long-term stress include long-term health problems, ongoing problems at work, or conflicts in your family. Long-term stress can harm your health.  How does stress affect your health?  When you are stressed, your body responds as though you are in danger. It makes hormones that speed up your heart, make you breathe faster, and give you a burst of energy. This is called the fight-or-flight stress response. If the stress is over quickly, your body goes back to normal and no harm is done.  But if stress happens too often or lasts too long, it can have bad effects. Long-term stress can make you more likely to get sick, and it can make symptoms of some diseases worse. If you tense up when you are stressed, you may develop neck, shoulder, or low back pain. Stress is linked to high blood pressure and heart disease.  Stress also harms your emotional health. It can make you espana, tense, or depressed. Your relationships may suffer, and you may not do well at work or school.  What can you do to manage stress?  You can try these things to help manage stress:   Do something active. Exercise or activity can help reduce stress. Walking is a great way to get started. Even everyday activities such as housecleaning or yard work can help.  Try yoga or antoinette chi. These techniques combine exercise and meditation. You may need some training at first to learn them.  Do something you enjoy. For example, listen to music or go to a movie. Practice your hobby or do volunteer  "work.  Meditate. This can help you relax, because you are not worrying about what happened before or what may happen in the future.  Do guided imagery. Imagine yourself in any setting that helps you feel calm. You can use online videos, books, or a teacher to guide you.  Do breathing exercises. For example:  From a standing position, bend forward from the waist with your knees slightly bent. Let your arms dangle close to the floor.  Breathe in slowly and deeply as you return to a standing position. Roll up slowly and lift your head last.  Hold your breath for just a few seconds in the standing position.  Breathe out slowly and bend forward from the waist.  Let your feelings out. Talk, laugh, cry, and express anger when you need to. Talking with supportive friends or family, a counselor, or a jody leader about your feelings is a healthy way to relieve stress. Avoid discussing your feelings with people who make you feel worse.  Write. It may help to write about things that are bothering you. This helps you find out how much stress you feel and what is causing it. When you know this, you can find better ways to cope.  What can you do to prevent stress?  You might try some of these things to help prevent stress:  Manage your time. This helps you find time to do the things you want and need to do.  Get enough sleep. Your body recovers from the stresses of the day while you are sleeping.  Get support. Your family, friends, and community can make a difference in how you experience stress.  Limit your news feed. Avoid or limit time on social media or news that may make you feel stressed.  Do something active. Exercise or activity can help reduce stress. Walking is a great way to get started.  Where can you learn more?  Go to https://www.[employer].net/patiented  Enter N032 in the search box to learn more about \"Learning About Stress.\"  Current as of: October 24, 2023  Content Version: 14.3    2024 [employer]. "   Care instructions adapted under license by your healthcare professional. If you have questions about a medical condition or this instruction, always ask your healthcare professional. [employer] disclaims any warranty or liability for your use of this information.    Learning About Depression Screening  What is depression screening?  Depression screening is a way to see if you have depression symptoms. It may be done by a doctor or counselor. It's often part of a routine checkup. That's because your mental health is just as important as your physical health.  Depression is a mental health condition that affects how you feel, think, and act. You may:  Have less energy.  Lose interest in your daily activities.  Feel sad and grouchy for a long time.  Depression is very common. It affects people of all ages.  Many things can lead to depression. Some people become depressed after they have a stroke or find out they have a major illness like cancer or heart disease. The death of a loved one or a breakup may lead to depression. It can run in families. Most experts believe that a combination of inherited genes and stressful life events can cause it.  What happens during screening?  You may be asked to fill out a form about your depression symptoms. You and the doctor will discuss your answers. The doctor may ask you more questions to learn more about how you think, act, and feel.  What happens after screening?  If you have symptoms of depression, your doctor will talk to you about your options.  Doctors usually treat depression with medicines or counseling. Often, combining the two works best. Many people don't get help because they think that they'll get over the depression on their own. But people with depression may not get better unless they get treatment.  The cause of depression is not well understood. There may be many factors involved. But if you have depression, it's not your fault.  A serious symptom  "of depression is thinking about death or suicide. If you or someone you care about talks about this or about feeling hopeless, get help right away.  It's important to know that depression can be treated. Medicine, counseling, and self-care may help.  Where can you learn more?  Go to https://www.[employer].net/patiented  Enter T185 in the search box to learn more about \"Learning About Depression Screening.\"  Current as of: July 31, 2024  Content Version: 14.3    2024 [employer].   Care instructions adapted under license by your healthcare professional. If you have questions about a medical condition or this instruction, always ask your healthcare professional. [employer] disclaims any warranty or liability for your use of this information.       "

## 2025-03-03 NOTE — PROGRESS NOTES
Preventive Care Visit  [facility]  Lenny Samaniego MD, Family Medicine  Mar 3, 2025    Assessment & Plan     Routine general medical examination at a health care facility  Due for fasting glucose and lipid, obtained. UTD on mammogram and colonoscopy. Due for pap smear, obtained. Discussed vaccination recommendations.  - Pneumococcal 20 Valent Conjugate (PCV20)    Cervical cancer screening  - HPV and Gynecologic Cytology Panel - Recommended Age 30 - 65 Years    Hyperlipidemia LDL goal <160  On 5mg rosuvastatin. Due for fasting lipid today.  - Lipid panel reflex to direct LDL Fasting    Moderate episode of recurrent major depressive disorder (H)  Anxiety  PHQ of 15. BROOKS of 16. Starting sertraline today, 25mg x1 week, increase to 50mg thereafter. Of note, is on trazodone, 200mg nightly for insomnia. Reviewed potential serotonin syndrome however patient did well on combination trazodone and sertraline in past thus will start cautiously. Reviewed symptoms/signs of serotonin syndrome. Does have passive SI, no active plan. Protective factors with children and . Feels she could reach out if thoughts of specific plan arose. Follow up in one month to reassess.  - sertraline (ZOLOFT) 25 MG tablet  Dispense: 60 tablet; Refill: 0    Insomnia, unspecified type  On trazodone, 200mg daily. Ok to refill.     Edema of both legs  On 25mg hydrochlorothiazide daily. Working well. Due for BMP, ordered.  - Basic metabolic panel  (Ca, Cl, CO2, Creat, Gluc, K, Na, BUN)    Hypokalemia  Likely 2/2 hydrochlorothiazide as above. She has been working on incorporating potassium-containing foods. Recheck K today.     Hypothyroidism, unspecified type  Well controlled on 300mcg levothyroxine on M, F and 150mcg all other days. Due for repeat TSH.   - TSH WITH FREE T4 REFLEX    Hx of melanoma  Following with Dermatology for routine q6 month check ups.    Morbid obesity (H)  Was on GLP1 but insurance no longer covering.  "Plan to discuss alternatives at follow up appt in one month.     Rheumatoid arthritis involving both hands with negative rheumatoid factor (H)  Per chart review, last visit with Rheumatology in 2020. Not currently on pharmacotherapy.     BMI  Estimated body mass index is 38.54 kg/m  as calculated from the following:    Height as of this encounter: 1.67 m (5' 5.75\").    Weight as of this encounter: 107.5 kg (237 lb).     In addition to the preventive service, I spent 40 minutes discussing the patient's chronic conditions and ongoing management/treatment.    The longitudinal plan of care for the diagnosis(es)/condition(s) as documented were addressed during this visit. Due to the added complexity in care, I will continue to support Brynn in the subsequent management and with ongoing continuity of care.    Counseling  Appropriate preventive services were discussed with this patient.  Checklist reviewing preventive services available has been given to the patient.    Follow up in one month as above    Lenny Samaniego MD  [facility]  3/3/2025    Subjective   Brynn is a 59 year old, presenting for the following:  Physical (Pap due)        3/3/2025     6:53 AM   Additional Questions   Roomed by Melissa TOPETE        Via the Health Maintenance questionnaire, the patient has reported the following services have been completed -Mammogram: [facility] 2025-02-28, this information has not been sent to the abstraction team.    Insurance stopped covering wegovy so pt would like to discuss other options.     Pap due.    Pt is fasting in case of labs.    HPI    Depression  Having significant depression and anxiety since November with the new administration.  She works at the [employer] and is fearful that she will lose her job.   She is also fearful of all the potential future changes.   She described prior depression when father was dying (in ?2019). She was on sertraline during this time frame and thinks she tolerating the " medication ok.   She was given buspirone by her PCP and is only taking this prn. Doesn't seem to be doing much.  Is very busy with her job, helping her mom and brother (who just moved from [place] and has ALS). She doesn't feel she has time to add in therapy at this moment.  Does express passive suicidal ideation but no active plan. Has protective factors with her children.  is supportive. No guns at home. Feels she would be able to let someone know if she develops plan to harm herself.     Leg edema  Is on hydrochlorothiazide 25mg daily. Working well.    Hypokalemia  Was on potassium supplement but just elected to take higher potassium-containing foods.    HLD  Is taking rosuvastatin daily. No issues.    Insomnia  Is taking trazodone nightly for a long time, like 10 years or so.   Is taking 2 tablets of trazodone.    Hypothyroid  Doing well on levothyroxine. No issues.    Advance Care Planning  Patient does not have a Health Care Directive: Discussed advance care planning with patient; however, patient declined at this time.        2/28/2025   General Health   How would you rate your overall physical health? Good   Feel stress (tense, anxious, or unable to sleep) Very much   (!) STRESS CONCERN        2/28/2025   Nutrition   Three or more servings of calcium each day? Yes   Diet: Regular (no restrictions)   How many servings of fruit and vegetables per day? (!) 2-3   How many sweetened beverages each day? 0-1         2/28/2025   Exercise   Days per week of moderate/strenous exercise 2 days   Average minutes spent exercising at this level 30 min   (!) EXERCISE CONCERN        2/28/2025   Social Factors   Frequency of gathering with friends or relatives Once a week   Worry food won't last until get money to buy more No   Food not last or not have enough money for food? No   Do you have housing? (Housing is defined as stable permanent housing and does not include staying ouside in a car, in a tent, in an  abandoned building, in an overnight shelter, or couch-surfing.) Yes   Are you worried about losing your housing? No   Lack of transportation? No   Unable to get utilities (heat,electricity)? No         2025   Fall Risk   Fallen 2 or more times in the past year? No   Trouble with walking or balance? No          2025   Dental   Dentist two times every year? Yes         3/1/2024   TB Screening   Were you born outside of the [place]? No     Today's PHQ-9 Score:       3/3/2025     6:47 AM   PHQ-9 SCORE   PHQ-9 Total Score MyChart 15 (Moderately severe depression)   PHQ-9 Total Score 15        Patient-reported         2025   Substance Use   Alcohol more than 3/day or more than 7/wk No   Do you use any other substances recreationally? No     Social History     Tobacco Use    Smoking status: Former     Current packs/day: 0.00     Average packs/day: 1.5 packs/day for 20.0 years (30.0 ttl pk-yrs)     Types: Cigarettes     Start date: 1976     Quit date: 1996     Years since quittin.1     Passive exposure: Past    Smokeless tobacco: Never   Vaping Use    Vaping status: Never Used   Substance Use Topics    Alcohol use: Yes     Alcohol/week: 3.0 standard drinks of alcohol     Types: 3 Standard drinks or equivalent per week     Comment: wine on the weekend    Drug use: No         2025   LAST FHS-7 RESULTS   1st degree relative breast or ovarian cancer No   Any relative bilateral breast cancer No   Any male have breast cancer No   Any ONE woman have BOTH breast AND ovarian cancer No   Any woman with breast cancer before 50yrs No   2 or more relatives with breast AND/OR ovarian cancer No   2 or more relatives with breast AND/OR bowel cancer No     Mammogram Screening - Mammogram every 1-2 years updated in Health Maintenance based on mutual decision making        2025   STI Screening   New sexual partner(s) since last STI/HIV test? No     History of abnormal Pap smear: Had cryotherapy in office in  "her early 20's. Thinks it was LSIL. Not CIN2, CIN3 diagnosis. No LEEP or cone biopsy performed.        Latest Ref Rng & Units 1/4/2019     8:30 AM 1/4/2019     8:24 AM 2/26/2014    12:00 AM   PAP / HPV   PAP (Historical)   NIL  NIL    HPV 16 DNA NEG^Negative Negative      HPV 18 DNA NEG^Negative Negative      Other HR HPV NEG^Negative Negative        ASCVD Risk   The ASCVD Risk score (Ruiz SHEA, et al., 2019) failed to calculate for the following reasons:    Risk score cannot be calculated because patient has a medical history suggesting prior/existing ASCVD      Reviewed and updated as needed this visit by Provider   Tobacco  Allergies  Meds   Med Hx  Surg Hx  Fam Hx               Objective    Exam  /80 (BP Location: Right arm, Patient Position: Sitting, Cuff Size: Adult Large)   Pulse 75   Temp 97.6  F (36.4  C) (Oral)   Resp 11   Ht 1.67 m (5' 5.75\")   Wt 107.5 kg (237 lb)   SpO2 98%   BMI 38.54 kg/m     Estimated body mass index is 38.54 kg/m  as calculated from the following:    Height as of this encounter: 1.67 m (5' 5.75\").    Weight as of this encounter: 107.5 kg (237 lb).    Physical Exam  GENERAL: healthy, alert and no distress  HEAD: Normocephalic, atraumatic.   EYES: PERRL. Normal conjunctivae, sclera.   ENT: Normal EAC and TMs bilaterally. Normal oropharynx.   NECK: Supple. No lymphadenopathy appreciated. Trachea midline. Thyroid not enlarged, not TTP.  RESP: lungs clear to auscultation - no rales, rhonchi or wheezes  CV: regular rate and rhythm, normal S1 S2, no murmur, click, rub or gallop.  No peripheral swelling noted.   ABDOMEN: soft, no TTP x4 quadrants. No hepatomegaly or masses appreciated. BS normactive.  MSK: no gross musculoskeletal defects noted.  SKIN: no suspicious lesions or rashes.  EXT: Warm and well perfused. DP pulses 2+ bilaterally.  NEURO: CNII-XII grossly intact. No focal deficits.  PSYCH: Groomed, dressed appropriately for weather.  Logical, linear " "thought process. Mood is \"down, anxious\" with tearful affect.     Signed Electronically by: Lenny Samaniego MD    Answers submitted by the patient for this visit:  Patient Health Questionnaire (Submitted on 3/3/2025)  If you checked off any problems, how difficult have these problems made it for you to do your work, take care of things at home, or get along with other people?: Very difficult  PHQ9 TOTAL SCORE: 15    "

## 2025-03-06 ENCOUNTER — THERAPY VISIT (OUTPATIENT)
Dept: PHYSICAL THERAPY | Facility: CLINIC | Age: 60
End: 2025-03-06
Attending: PODIATRIST
Payer: COMMERCIAL

## 2025-03-06 DIAGNOSIS — M72.2 PLANTAR FASCIITIS, RIGHT: ICD-10-CM

## 2025-03-06 DIAGNOSIS — M21.42 BILATERAL PES PLANUS: ICD-10-CM

## 2025-03-06 DIAGNOSIS — M77.41 METATARSALGIA, RIGHT FOOT: ICD-10-CM

## 2025-03-06 DIAGNOSIS — M21.41 BILATERAL PES PLANUS: ICD-10-CM

## 2025-03-06 DIAGNOSIS — M06.041 RHEUMATOID ARTHRITIS INVOLVING BOTH HANDS WITH NEGATIVE RHEUMATOID FACTOR (H): ICD-10-CM

## 2025-03-06 DIAGNOSIS — M06.042 RHEUMATOID ARTHRITIS INVOLVING BOTH HANDS WITH NEGATIVE RHEUMATOID FACTOR (H): ICD-10-CM

## 2025-03-06 DIAGNOSIS — M62.461 GASTROCNEMIUS EQUINUS, RIGHT: ICD-10-CM

## 2025-03-06 ASSESSMENT — ACTIVITIES OF DAILY LIVING (ADL)
MAKING_SHARP_TURNS_WHILE_RUNNING_FAST: EXTREME DIFFICULTY OR UNABLE TO PERFORM ACTIVITY
RUNNING_ON_UNEVEN_GROUND: EXTREME DIFFICULTY OR UNABLE TO PERFORM ACTIVITY
YOUR_USUAL_HOBBIES,_RECREATIONAL_OR_SPORTING_ACTIVITIES: MODERATE DIFFICULTY
WALKING_BETWEEN_ROOMS: A LITTLE BIT OF DIFFICULTY
SQUATTING: NO DIFFICULTY
SITTING_FOR_1_HOUR: NO DIFFICULTY
LEFS_SCORE(%): 0
GETTING_INTO_OR_OUT_OF_A_CAR: NO DIFFICULTY
WALKING_A_MILE: QUITE A BIT OF DIFFICULTY
LIFTING_AN_OBJECT,_LIKE_A_BAG_OF_GROCERIES_FROM_THE_FLOOR: NO DIFFICULTY
GETTING_INTO_AND_OUT_OF_A_BATH: NO DIFFICULTY
PUTTING_ON_YOUR_SHOES_OR_SOCKS: NO DIFFICULTY
ROLLING_OVER_IN_BED: NO DIFFICULTY
RUNNING_ON_EVEN_GROUND: EXTREME DIFFICULTY OR UNABLE TO PERFORM ACTIVITY
PERFORMING_LIGHT_ACTIVITIES_AROUND_YOUR_HOME: NO DIFFICULTY
GOING_UP_OR_DOWN_10_STAIRS: NO DIFFICULTY
ANY_OF_YOUR_USUAL_WORK,_HOUSEWORK_OR_SCHOOL_ACTIVITIES: MODERATE DIFFICULTY
LEFS_RAW_SCORE: 0
PERFORMING_HEAVY_ACTIVITIES_AROUND_YOUR_HOME: MODERATE DIFFICULTY
SHOPPING: EXTREME DIFFICULTY OR UNABLE TO PERFORM ACTIVITY
WALKING_2_BLOCKS: MODERATE DIFFICULTY
PLEASE_INDICATE_YOR_PRIMARY_REASON_FOR_REFERRAL_TO_THERAPY:: FOOT AND/OR ANKLE
STANDING_FOR_1_HOUR: QUITE A BIT OF DIFFICULTY

## 2025-03-06 NOTE — PROGRESS NOTES
PHYSICAL THERAPY EVALUATION  Type of Visit: Evaluation       Fall Risk Screen:  Fall screen completed by: PT  Have you fallen 2 or more times in the past year?: No  Have you fallen and had an injury in the past year?: No  Is patient a fall risk?: No    Subjective     Pt c/o R foot/ankle pain x 2 years.   Pt states pain can be from AT/heel upto toes lateral >medial. Pt had injection R PF 2/25/2025 with moderate change noted. Denies injury.  MD order date 2/25/2025.  Pt is scheduled to be fit for orthotics in near future. Pt also notes B hand pain related to RA.    X rays:  Mild degenerative change first MTP joint. No evidence for acute fracture. Plantar and Achilles calcaneal spurring. Degenerative change at the talonavicular and naviculocuneiform joints.         Presenting condition or subjective complaint: Right heel, 5th metatarsel and achilles  Date of onset: 02/25/25 (MD order date)    Relevant medical history: Arthritis; Overweight; Thyroid problems   Dates & types of surgery: None in past decade    Prior diagnostic imaging/testing results: X-ray     Prior therapy history for the same diagnosis, illness or injury: No      Prior Level of Function  Transfers: Independent  Ambulation: Independent  ADL: Independent  IADL: Driving, Finances, Housekeeping, Laundry, Meal preparation, Medication management, Work    Living Environment  Social support: With a significant other or spouse   Type of home: House; Multi-level; Basement   Stairs to enter the home: No       Ramp: No   Stairs inside the home: Yes 24 Is there a railing: Yes     Help at home: Home and Yard maintenance tasks  Equipment owned:       Employment: Yes NP  Hobbies/Interests: Horses, crafts, estate sales    Patient goals for therapy: Walk longer distance, walk/jog for exercise, have less discomfort    Pain assessment: Pain present     Objective   FOOT/ANKLE EVALUATION  PAIN: Pain Level at Rest: 2/10  Pain Level with Use: 7/10  Pain Location: ankle  Pain  Quality: Aching, Sharp, Shooting, and Stabbing  Pain Frequency: constant  Pain is Worst: daytime  Pain is Exacerbated By: walk, standing, working  Pain is Relieved By: cold, NSAIDs, and rest  Pain Progression: Unchanged  INTEGUMENTARY (edema, incisions):  mild swelling at AT insertion  POSTURE: WFL  GAIT:   Weightbearing Status: WBAT  Assistive Device(s): None  Gait Deviations: Push off decreased R  BALANCE/PROPRIOCEPTION: Single Leg Stance Eyes Open (seconds): 20-30sec with + R  WEIGHT BEARING ALIGNMENT:  B pes planus  NON-WEIGHTBEARING ALIGNMENT:    ROM: AROM WFL  PROM WFL  ERT with DF/PF R    STRENGTH:  L ankle 5/5, R DF 5/5, PF 5-/5 +/- (10 dbl leg TR with mild/mod + R), IN 5/5, EV 5-/5 -. Moderate fatigue in arch with toe curls R   FLEXIBILITY:  decreased gastroc R>L limitations  SPECIAL TESTS:   FUNCTIONAL TESTS:   PALPATION:  TTP R AT insertion and medial calc, plantar fasciitis  JOINT MOBILITY: WFL    Assessment & Plan   CLINICAL IMPRESSIONS  Medical Diagnosis: Bilateral pes planus, Plantar fasciitis, right, Gastrocnemius equinus, right, Metatarsalgia, right foot    Treatment Diagnosis: R foot/ankle pain   Impression/Assessment: Patient is a 59 year old female with R foot/ankle pain complaints.  The following significant findings have been identified: Pain, Decreased ROM/flexibility, Decreased strength, Decreased proprioception, Inflammation, Impaired gait, Impaired muscle performance, and Decreased activity tolerance. These impairments interfere with their ability to perform self care tasks, work tasks, recreational activities, household chores, driving , household mobility, and community mobility as compared to previous level of function.     Clinical Decision Making (Complexity):  Clinical Presentation: Stable/Uncomplicated  Clinical Presentation Rationale: based on medical and personal factors listed in PT evaluation  Clinical Decision Making (Complexity): Low complexity    PLAN OF CARE  Treatment  Interventions:  Modalities: Iontophoresis, Ultrasound  Interventions: Gait Training, Manual Therapy, Neuromuscular Re-education, Therapeutic Activity, Therapeutic Exercise    Long Term Goals     PT Goal 1  Goal Identifier: Ambulation  Goal Description: Be able to ambulate 45+ minutes without deviation pain free  Rationale:  (for safe household ambulation;for safe community ambulation;to maintain proper body mechanics/posture while ambulating to avoid additional compensatory injury due to improper gait mechanics;to promote a healthy and active lifestyle)  Target Date: 05/29/25      Frequency of Treatment: 1x/week decreasing to every other week  Duration of Treatment: 12 weeks    Recommended Referrals to Other Professionals:   Education Assessment:   Learner/Method: Patient;Demonstration;Pictures/Video  Education Comments: print    Risks and benefits of evaluation/treatment have been explained.   Patient/Family/caregiver agrees with Plan of Care.     Evaluation Time:     PT Eval, Low Complexity Minutes (30564): 15       Signing Clinician: Oren Garrett PT

## 2025-03-11 LAB
HPV HR 12 DNA CVX QL NAA+PROBE: NEGATIVE
HPV16 DNA CVX QL NAA+PROBE: NEGATIVE
HPV18 DNA CVX QL NAA+PROBE: NEGATIVE
HUMAN PAPILLOMA VIRUS FINAL DIAGNOSIS: NORMAL

## 2025-03-12 PROBLEM — Z87.42 HISTORY OF ABNORMAL CERVICAL PAP SMEAR: Status: ACTIVE | Noted: 2025-03-12

## 2025-04-11 ENCOUNTER — OFFICE VISIT (OUTPATIENT)
Dept: FAMILY MEDICINE | Facility: CLINIC | Age: 60
End: 2025-04-11
Payer: COMMERCIAL

## 2025-04-11 VITALS
BODY MASS INDEX: 38.57 KG/M2 | DIASTOLIC BLOOD PRESSURE: 76 MMHG | RESPIRATION RATE: 17 BRPM | HEART RATE: 77 BPM | OXYGEN SATURATION: 98 % | TEMPERATURE: 98.1 F | HEIGHT: 66 IN | WEIGHT: 240 LBS | SYSTOLIC BLOOD PRESSURE: 111 MMHG

## 2025-04-11 DIAGNOSIS — E66.01 MORBID OBESITY (H): ICD-10-CM

## 2025-04-11 DIAGNOSIS — F33.1 MODERATE EPISODE OF RECURRENT MAJOR DEPRESSIVE DISORDER (H): Primary | ICD-10-CM

## 2025-04-11 DIAGNOSIS — G47.00 INSOMNIA, UNSPECIFIED TYPE: ICD-10-CM

## 2025-04-11 DIAGNOSIS — F41.9 ANXIETY: ICD-10-CM

## 2025-04-11 PROCEDURE — 1126F AMNT PAIN NOTED NONE PRSNT: CPT | Performed by: STUDENT IN AN ORGANIZED HEALTH CARE EDUCATION/TRAINING PROGRAM

## 2025-04-11 PROCEDURE — 99214 OFFICE O/P EST MOD 30 MIN: CPT | Performed by: STUDENT IN AN ORGANIZED HEALTH CARE EDUCATION/TRAINING PROGRAM

## 2025-04-11 PROCEDURE — 3074F SYST BP LT 130 MM HG: CPT | Performed by: STUDENT IN AN ORGANIZED HEALTH CARE EDUCATION/TRAINING PROGRAM

## 2025-04-11 PROCEDURE — 3078F DIAST BP <80 MM HG: CPT | Performed by: STUDENT IN AN ORGANIZED HEALTH CARE EDUCATION/TRAINING PROGRAM

## 2025-04-11 ASSESSMENT — PAIN SCALES - GENERAL: PAINLEVEL_OUTOF10: NO PAIN (0)

## 2025-04-11 ASSESSMENT — ANXIETY QUESTIONNAIRES
1. FEELING NERVOUS, ANXIOUS, OR ON EDGE: MORE THAN HALF THE DAYS
4. TROUBLE RELAXING: SEVERAL DAYS
2. NOT BEING ABLE TO STOP OR CONTROL WORRYING: MORE THAN HALF THE DAYS
8. IF YOU CHECKED OFF ANY PROBLEMS, HOW DIFFICULT HAVE THESE MADE IT FOR YOU TO DO YOUR WORK, TAKE CARE OF THINGS AT HOME, OR GET ALONG WITH OTHER PEOPLE?: VERY DIFFICULT
GAD7 TOTAL SCORE: 11
GAD7 TOTAL SCORE: 11
7. FEELING AFRAID AS IF SOMETHING AWFUL MIGHT HAPPEN: MORE THAN HALF THE DAYS
5. BEING SO RESTLESS THAT IT IS HARD TO SIT STILL: SEVERAL DAYS
GAD7 TOTAL SCORE: 11
7. FEELING AFRAID AS IF SOMETHING AWFUL MIGHT HAPPEN: MORE THAN HALF THE DAYS
IF YOU CHECKED OFF ANY PROBLEMS ON THIS QUESTIONNAIRE, HOW DIFFICULT HAVE THESE PROBLEMS MADE IT FOR YOU TO DO YOUR WORK, TAKE CARE OF THINGS AT HOME, OR GET ALONG WITH OTHER PEOPLE: VERY DIFFICULT
6. BECOMING EASILY ANNOYED OR IRRITABLE: SEVERAL DAYS
3. WORRYING TOO MUCH ABOUT DIFFERENT THINGS: MORE THAN HALF THE DAYS

## 2025-04-11 ASSESSMENT — PATIENT HEALTH QUESTIONNAIRE - PHQ9: SUM OF ALL RESPONSES TO PHQ QUESTIONS 1-9: 12

## 2025-04-11 NOTE — PROGRESS NOTES
"  Assessment & Plan     Moderate episode of recurrent major depressive disorder (H)  Anxiety  Mood improving on 25mg sertraline and overall tolerating well. Passive SI without any thoughts of harming self.  Protective factors with children and . Feels she could reach out if thoughts of specific plan arose. Plan to increase to 50mg dosage and follow up in one month to reassess.   - sertraline (ZOLOFT) 50 MG tablet  Dispense: 60 tablet; Refill: 0    Insomnia  Currently on 200mg trazodone. Increasing sertraline as above. Discussed concerns around serotonin syndrome and recommended decreasing to 150mg trazodone daily. Can also consider transition to melatonin.    Obesity (BMI 35.0-39.9) with comorbidity (H)  - Adult Comprehensive Weight Management  Referral    BMI  Estimated body mass index is 39.03 kg/m  as calculated from the following:    Height as of this encounter: 1.67 m (5' 5.75\").    Weight as of this encounter: 108.9 kg (240 lb).     Follow up in one month for mood reassessment    Lenny Samaniego MD  [facility]  4/14/2025    Hetal Swain is a 59 year old, presenting for the following health issues:  Follow Up, Anxiety, and Depression        4/11/2025     9:15 AM   Additional Questions   Roomed by Melissa TOPETE     History of Present Illness       Mental Health Follow-up:  Patient presents to follow-up on Depression & Anxiety.Patient's depression since last visit has been:  Better  The patient is not having other symptoms associated with depression.  Patient's anxiety since last visit has been:  Medium  The patient is not having other symptoms associated with anxiety.  Any significant life events: job concerns  Patient is feeling anxious or having panic attacks.  Patient has no concerns about alcohol or drug use.    Reason for visit:  Follow up depression anxiety and wt loss    She eats 2-3 servings of fruits and vegetables daily.She consumes 0 sweetened beverage(s) daily.She " "exercises with enough effort to increase her heart rate 10 to 19 minutes per day.  She exercises with enough effort to increase her heart rate 4 days per week.   She is taking medications regularly.        Medication Followup of Zoloft 25mg  Taking Medication as prescribed: yes  Side Effects:  None  Medication Helping Symptoms:  yes    Things are a little better. Not as tearful as she used to be.   Little tiny stomach ache, is improving with time though.   Passive suicidal ideation. No thoughts of harming self at all.   Protective factors with children and . Feels she could reach out if thoughts of specific plan arose.     Weight  Was on Contrave in the past but this made her feel irritable and jittery.   258 max in past 2 years.  Has been down to 205 with Wegovy in the past.     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         Objective    /76 (BP Location: Right arm, Patient Position: Sitting, Cuff Size: Adult Large)   Pulse 77   Temp 98.1  F (36.7  C) (Oral)   Resp 17   Ht 1.67 m (5' 5.75\")   Wt 108.9 kg (240 lb)   SpO2 98%   BMI 39.03 kg/m    Body mass index is 39.03 kg/m .    Physical Exam   GENERAL: healthy, alert and no distress  HEAD: Normocephalic, atraumatic.   EYES: Normal conjunctivae, sclera.   RESP: Normal respiratory effort.  MSK: no gross musculoskeletal defects noted.  SKIN: no suspicious lesions or rashes.  NEURO: CNII-XII grossly intact. No focal deficits.  PSYCH: Groomed, dressed appropriately for weather. Logical, linear thought process. No active SI. Mood is \"a little better\" with slightly blunted affect.     Signed Electronically by: Lenny Samaniego MD  "

## 2025-04-28 DIAGNOSIS — F33.1 MODERATE EPISODE OF RECURRENT MAJOR DEPRESSIVE DISORDER (H): ICD-10-CM

## 2025-04-28 RX ORDER — SERTRALINE HYDROCHLORIDE 25 MG/1
50 TABLET, FILM COATED ORAL DAILY
Qty: 60 TABLET | Refills: 0 | Status: SHIPPED | OUTPATIENT
Start: 2025-04-28

## 2025-05-14 ENCOUNTER — OFFICE VISIT (OUTPATIENT)
Dept: URGENT CARE | Facility: URGENT CARE | Age: 60
End: 2025-05-14
Payer: COMMERCIAL

## 2025-05-14 ENCOUNTER — ANCILLARY PROCEDURE (OUTPATIENT)
Dept: GENERAL RADIOLOGY | Facility: CLINIC | Age: 60
End: 2025-05-14
Attending: FAMILY MEDICINE
Payer: COMMERCIAL

## 2025-05-14 VITALS
WEIGHT: 235 LBS | BODY MASS INDEX: 38.22 KG/M2 | OXYGEN SATURATION: 96 % | SYSTOLIC BLOOD PRESSURE: 108 MMHG | RESPIRATION RATE: 18 BRPM | HEART RATE: 98 BPM | DIASTOLIC BLOOD PRESSURE: 76 MMHG | TEMPERATURE: 99 F

## 2025-05-14 DIAGNOSIS — J18.9 ATYPICAL PNEUMONIA: Primary | ICD-10-CM

## 2025-05-14 DIAGNOSIS — R06.02 SOB (SHORTNESS OF BREATH): ICD-10-CM

## 2025-05-14 DIAGNOSIS — R05.1 ACUTE COUGH: ICD-10-CM

## 2025-05-14 PROCEDURE — 71046 X-RAY EXAM CHEST 2 VIEWS: CPT | Mod: TC | Performed by: RADIOLOGY

## 2025-05-14 PROCEDURE — 3074F SYST BP LT 130 MM HG: CPT | Performed by: FAMILY MEDICINE

## 2025-05-14 PROCEDURE — 99214 OFFICE O/P EST MOD 30 MIN: CPT | Performed by: FAMILY MEDICINE

## 2025-05-14 PROCEDURE — 3078F DIAST BP <80 MM HG: CPT | Performed by: FAMILY MEDICINE

## 2025-05-14 RX ORDER — ALBUTEROL SULFATE 90 UG/1
2 INHALANT RESPIRATORY (INHALATION) EVERY 6 HOURS PRN
Qty: 18 G | Refills: 0 | Status: SHIPPED | OUTPATIENT
Start: 2025-05-14

## 2025-05-14 RX ORDER — BENZONATATE 200 MG/1
200 CAPSULE ORAL 3 TIMES DAILY PRN
Qty: 30 CAPSULE | Refills: 0 | Status: SHIPPED | OUTPATIENT
Start: 2025-05-14

## 2025-05-14 RX ORDER — AZITHROMYCIN 250 MG/1
TABLET, FILM COATED ORAL
Qty: 6 TABLET | Refills: 0 | Status: SHIPPED | OUTPATIENT
Start: 2025-05-14 | End: 2025-05-19

## 2025-05-14 RX ORDER — PREDNISONE 20 MG/1
40 TABLET ORAL DAILY
Qty: 10 TABLET | Refills: 0 | Status: SHIPPED | OUTPATIENT
Start: 2025-05-14 | End: 2025-05-19

## 2025-05-14 NOTE — PROGRESS NOTES
Urgent Care Clinic Visit    Chief Complaint   Patient presents with    Urgent Care     Cough, fever, st x 1 week- had negative strep test  on Sunday at                5/14/2025    10:09 AM   Additional Questions   Roomed by bryce Mariano    Pre-Provider Visit Orders- Influenza  Is this currently Influenza testing season?:  Yes  Does the patient present with a fever and either bodyaches, fatigue, or cough that have been present less than 48 hours? No

## 2025-05-14 NOTE — PROGRESS NOTES
SUBJECTIVE:   Amita Finley is a 59 year old female presenting with a chief complaint of cough, sore throat, fever.  Onset of symptoms was 1 week(s) ago.  Course of illness is worsening.    Severity moderate  Current and Associated symptoms: cough, voice hoarseness, sore throat  Treatment measures tried include: tessalon perles, flonase, mucinex, tylenol, ibuprofen, Fluids, and Rest.  Predisposing factors include seasonal allergies.    Coworker positive for strep, was seen at [facility] on  - negative    Minimal sinus congestion  Home rapid COVID test negative X 2     No history of asthma  Endorsed SOB with cough, worse with laying down    Past Medical History:   Diagnosis Date    Antiphospholipid antibodies positive 2011    Arthritis     Baker's cyst of knee     Cancer (H)     Colon grandmother    Cerebral infarction (H)     Aunt    Depressive disorder Anxiety    Diabetes (H)     DMII father    Heart disease     Father    Hypothyroidism     Obesity      Current Outpatient Medications   Medication Sig Dispense Refill    hydrochlorothiazide (HYDRODIURIL) 25 MG tablet Take 1 tablet (25 mg) by mouth daily. 90 tablet 3    levothyroxine (SYNTHROID/LEVOTHROID) 150 MCG tablet TAKE TWO TABLETS BY MOUTH ON  AND , AND TAKE ONE TABLET BY MOUTH ALL OTHER DAYS. 115 tablet 3    rosuvastatin (CRESTOR) 5 MG tablet Take 1 tablet (5 mg) by mouth daily. 90 tablet 3    sertraline (ZOLOFT) 25 MG tablet TAKE TWO TABLETS BY MOUTH ONCE DAILY 60 tablet 0    sertraline (ZOLOFT) 50 MG tablet Take 1 tablet (50 mg) by mouth daily. 60 tablet 0    traZODone (DESYREL) 100 MG tablet TAKE ONE TO TWO TABLETS BY MOUTH NIGHTLY AS NEEDED FOR SLEEP 180 tablet 3     Social History     Tobacco Use    Smoking status: Former     Current packs/day: 0.00     Average packs/day: 1.5 packs/day for 20.0 years (30.0 ttl pk-yrs)     Types: Cigarettes     Start date: 1976     Quit date: 1996     Years since quittin.3      Passive exposure: Past    Smokeless tobacco: Never   Substance Use Topics    Alcohol use: Yes     Alcohol/week: 3.0 standard drinks of alcohol     Types: 3 Standard drinks or equivalent per week     Comment: wine on the weekend       ROS:  Review of systems negative except as stated above.    OBJECTIVE:  /76   Pulse 98   Temp 99  F (37.2  C) (Tympanic)   Resp 18   Wt 106.6 kg (235 lb)   SpO2 96%   BMI 38.22 kg/m    GENERAL APPEARANCE: healthy, alert and no distress  EYES: EOMI,  PERRL, conjunctiva clear  HENT: mouth without ulcers, erythema or lesions  RESP: lungs clear to auscultation - no rales, rhonchi or wheezes  PSYCH: mentation appears normal and affect normal/bright    CXR - increase in perihilar congestion, right mid lung with increase in hazy circular ?granuloma, no pleural effusion, no pneumothorax personally viewed by me      ASSESSMENT/PLAN:  (J18.9) Atypical pneumonia  (primary encounter diagnosis)  Plan: albuterol (PROAIR HFA/PROVENTIL HFA/VENTOLIN         HFA) 108 (90 Base) MCG/ACT inhaler,         azithromycin (ZITHROMAX) 250 MG tablet            (R05.1) Acute cough  Plan: XR Chest 2 Views, benzonatate (TESSALON) 200 MG        capsule            (R06.02) SOB (shortness of breath)  Plan: albuterol (PROAIR HFA/PROVENTIL HFA/VENTOLIN         HFA) 108 (90 Base) MCG/ACT inhaler, predniSONE         (DELTASONE) 20 MG tablet            Reassurance given, discussed symptomatic treatment with tylenol, ibuprofen, plenty of fluids and rest.  RX Zpak given for atypical pneumonia with mild chest xray findings, will follow up on formal xray report and notify if any abnormalities.  RX prednisone burst and RX albuterol inhaler given for cough and bronchospasm symptoms.  RX tessalon perles given to help with cough.    Follow up with primary provider for recheck in 2 weeks    Fam Lopez MD  May 14, 2025 11:36 AM

## 2025-05-15 ENCOUNTER — RESULTS FOLLOW-UP (OUTPATIENT)
Dept: URGENT CARE | Facility: URGENT CARE | Age: 60
End: 2025-05-15

## 2025-05-16 ENCOUNTER — VIRTUAL VISIT (OUTPATIENT)
Dept: FAMILY MEDICINE | Facility: CLINIC | Age: 60
End: 2025-05-16
Payer: COMMERCIAL

## 2025-05-16 DIAGNOSIS — J18.9 ATYPICAL PNEUMONIA: Primary | ICD-10-CM

## 2025-05-16 DIAGNOSIS — R91.1 LUNG NODULE SEEN ON IMAGING STUDY: ICD-10-CM

## 2025-05-16 DIAGNOSIS — F32.1 MAJOR DEPRESSIVE DISORDER, SINGLE EPISODE, MODERATE (H): ICD-10-CM

## 2025-05-16 DIAGNOSIS — F33.1 MODERATE EPISODE OF RECURRENT MAJOR DEPRESSIVE DISORDER (H): ICD-10-CM

## 2025-05-16 DIAGNOSIS — F41.9 ANXIETY: ICD-10-CM

## 2025-05-16 PROCEDURE — 96127 BRIEF EMOTIONAL/BEHAV ASSMT: CPT | Mod: 95 | Performed by: STUDENT IN AN ORGANIZED HEALTH CARE EDUCATION/TRAINING PROGRAM

## 2025-05-16 PROCEDURE — 98006 SYNCH AUDIO-VIDEO EST MOD 30: CPT | Performed by: STUDENT IN AN ORGANIZED HEALTH CARE EDUCATION/TRAINING PROGRAM

## 2025-05-16 NOTE — PROGRESS NOTES
"Brynn is a 59 year old who is being evaluated via a billable video visit.    How would you like to obtain your AVS? MyChart  If the video visit is dropped, the invitation should be resent by: Text to cell phone: [phone]  Will anyone else be joining your video visit? No    Assessment & Plan     Atypical pneumonia  Symptoms overall improving with azithromycin and prednisone. Does endorse post-tussive near-emesis. Plan to add on pertussis PCR for [facility] reporting purposes but is already on appropriate therapy for this. Discussed red flag symptoms/signs that should prompt her to be seen immediately.   - B. pertussis/parapertussis PCR-NP    Lung nodule seen on imaging study  10 year smoking hx.  Ordered CT chest, she prefers results to be released immediately and is aware she will see this before I get a chance to look.   - CT Chest w/o Contrast    Major depressive disorder, single episode, moderate (H)  Anxiety  Doing well on 50mg sertraline. Still on 200mg trazodone. Aware of risk of serotonin syndrome and would like to continue medication regimen. Ok to refill for 6 months.     BMI  Estimated body mass index is 38.22 kg/m  as calculated from the following:    Height as of 4/11/25: 1.67 m (5' 5.75\").    Weight as of 5/14/25: 106.6 kg (235 lb).     Follow up in 6 months, earlier as needed    Lenny Samaniego MD  [facility]  5/16/2025    Subjective   Brynn is a 59 year old, presenting for the following health issues:  Recheck Medication        5/16/2025    10:24 AM   Additional Questions   Roomed by MR   Accompanied by NA         5/16/2025    10:24 AM   Patient Reported Additional Medications   Patient reports taking the following new medications NA     History of Present Illness       Reason for visit:  F/U sertraline and F/u plan for X-ray from today    She eats 2-3 servings of fruits and vegetables daily.She consumes 0 sweetened beverage(s) daily.She exercises with enough effort to increase her heart rate " 10 to 19 minutes per day.  She exercises with enough effort to increase her heart rate 4 days per week.   She is taking medications regularly.     Follow up   Had CXR on 5/14.     Depression and Anxiety   How are you doing with your depression since your last visit? Improved   How are you doing with your anxiety since your last visit?  Improved   Are you having other symptoms that might be associated with depression or anxiety? No  Have you had a significant life event? No   Do you have any concerns with your use of alcohol or other drugs? No          3/3/2025     6:47 AM 4/11/2025     9:23 AM 5/14/2025     4:58 PM   PHQ   PHQ-9 Total Score 15  12 3   Q9: Thoughts of better off dead/self-harm past 2 weeks More than half the days Several days Not at all   F/U: Thoughts of suicide or self-harm Yes     F/U: Self harm-plan No     F/U: Self-harm action No     F/U: Safety concerns No         Patient-reported         7/18/2022     7:13 AM 3/3/2025     7:07 AM 4/11/2025     9:14 AM   BROOKS-7 SCORE   Total Score 4 (minimal anxiety)  11 (moderate anxiety)   Total Score 4 16 11        Patient-reported       Pneumonia  Symptoms started the Tuesday prior with red eyes, worsening until Sunday - [facility] as colleague had this. Was negative.   Fever spiked on Monday/Tuesday   Maybe is feeling a little better but chest is still super tight. The prednisone has helped a lot.   Still feeling SOB and kind of crummy.   No longer sleeping all the time, was literally sleeping for almost 2 straight days prior to being seen at .   A lot of coughing, sore from coughing.   Sore neck, back, chest. Is getting a little bit better. Cough is not productive, hard to get stuff up.   Using mucinex and humidifier.   Coughing speels like she almost vomits. Had three of these yesterday. This is getting better too though.   Fever at worst was 102.  Now temp is down in the 99s and 100s in the evening - [place]   Goes to bed with the flushing and does wake up and it is gone but feels this is likely from the steroid.  No one else sick around her.     Overall she is feeling better, less and lower fevers, less cough and SOB.     Lung nodule  Prior smoker, pack per day for maybe 10 years.     Mood  Doing well. Feels stable now on 50mg sertraline. No side effcts.   Is still taking the 200mg trazadone daily. Is aware of serotonin syndrome concern.        Objective         Vitals:  No vitals were obtained today due to virtual visit.    Physical Exam   GENERAL: alert and no distress. Able to complete full sentences, no SOB noted.   EYES: Eyes grossly normal to inspection.  No discharge or erythema, or obvious scleral/conjunctival abnormalities.  RESP: Occasional cough. No audible wheeze or visible cyanosis.    SKIN: Visible skin clear. No significant rash, abnormal pigmentation or lesions.  NEURO: Cranial nerves grossly intact.  Mentation and speech appropriate for age.  PSYCH: Appropriate affect, tone, and pace of words    Video-Visit Details  Type of service:  Video Visit   Originating Location (pt. Location): Home  Distant Location (provider location):  On-site  Platform used for Video Visit: April  Signed Electronically by: Lenny Samaniego MD

## 2025-05-19 PROBLEM — Z86.006 HX OF MELANOMA IN SITU: Status: ACTIVE | Noted: 2025-05-19

## 2025-05-23 ENCOUNTER — HOSPITAL ENCOUNTER (OUTPATIENT)
Dept: CT IMAGING | Facility: CLINIC | Age: 60
Discharge: HOME | End: 2025-05-23
Attending: STUDENT IN AN ORGANIZED HEALTH CARE EDUCATION/TRAINING PROGRAM | Admitting: STUDENT IN AN ORGANIZED HEALTH CARE EDUCATION/TRAINING PROGRAM
Payer: COMMERCIAL

## 2025-05-23 DIAGNOSIS — R91.1 LUNG NODULE SEEN ON IMAGING STUDY: ICD-10-CM

## 2025-05-23 PROCEDURE — 71250 CT THORAX DX C-: CPT

## 2025-05-26 ENCOUNTER — MYC MEDICAL ADVICE (OUTPATIENT)
Dept: FAMILY MEDICINE | Facility: CLINIC | Age: 60
End: 2025-05-26
Payer: COMMERCIAL

## 2025-05-27 NOTE — TELEPHONE ENCOUNTER
CT is still in process, has not been signed.  Called [facility] - [phone].  Called and spoke to Cyndi.  She advised that may be normal with the holiday weekend.  She advised could be the next day or two.

## 2025-05-28 ENCOUNTER — RESULTS FOLLOW-UP (OUTPATIENT)
Dept: FAMILY MEDICINE | Facility: CLINIC | Age: 60
End: 2025-05-28

## 2025-06-10 ENCOUNTER — OFFICE VISIT (OUTPATIENT)
Dept: INTERNAL MEDICINE | Facility: CLINIC | Age: 60
End: 2025-06-10
Payer: COMMERCIAL

## 2025-06-10 VITALS
RESPIRATION RATE: 16 BRPM | SYSTOLIC BLOOD PRESSURE: 105 MMHG | TEMPERATURE: 97.9 F | BODY MASS INDEX: 38.54 KG/M2 | HEIGHT: 66 IN | DIASTOLIC BLOOD PRESSURE: 72 MMHG | HEART RATE: 98 BPM | OXYGEN SATURATION: 96 % | WEIGHT: 239.8 LBS

## 2025-06-10 DIAGNOSIS — Z87.01 HX OF BACTERIAL PNEUMONIA: ICD-10-CM

## 2025-06-10 DIAGNOSIS — R06.02 SHORTNESS OF BREATH: Primary | ICD-10-CM

## 2025-06-10 DIAGNOSIS — R10.11 ABDOMINAL PAIN, RIGHT UPPER QUADRANT: ICD-10-CM

## 2025-06-10 DIAGNOSIS — R05.9 COUGH, UNSPECIFIED TYPE: ICD-10-CM

## 2025-06-10 PROCEDURE — 3074F SYST BP LT 130 MM HG: CPT | Performed by: NURSE PRACTITIONER

## 2025-06-10 PROCEDURE — 3078F DIAST BP <80 MM HG: CPT | Performed by: NURSE PRACTITIONER

## 2025-06-10 PROCEDURE — 99213 OFFICE O/P EST LOW 20 MIN: CPT | Performed by: NURSE PRACTITIONER

## 2025-06-10 RX ORDER — DOXYCYCLINE HYCLATE 100 MG
100 TABLET ORAL 2 TIMES DAILY
Qty: 20 TABLET | Refills: 0 | Status: SHIPPED | OUTPATIENT
Start: 2025-06-10 | End: 2025-06-20

## 2025-06-10 RX ORDER — BUDESONIDE AND FORMOTEROL FUMARATE DIHYDRATE 80; 4.5 UG/1; UG/1
2 AEROSOL RESPIRATORY (INHALATION) 2 TIMES DAILY
Qty: 10.2 G | Refills: 0 | Status: SHIPPED | OUTPATIENT
Start: 2025-06-10

## 2025-06-10 RX ORDER — PREDNISONE 20 MG/1
TABLET ORAL
Qty: 15 TABLET | Refills: 0 | Status: SHIPPED | OUTPATIENT
Start: 2025-06-10 | End: 2025-06-20

## 2025-06-10 ASSESSMENT — ENCOUNTER SYMPTOMS: COUGH: 1

## 2025-06-23 ENCOUNTER — HOSPITAL ENCOUNTER (OUTPATIENT)
Dept: ULTRASOUND IMAGING | Facility: CLINIC | Age: 60
Discharge: HOME | End: 2025-06-23
Attending: NURSE PRACTITIONER | Admitting: NURSE PRACTITIONER
Payer: COMMERCIAL

## 2025-06-23 DIAGNOSIS — R10.11 ABDOMINAL PAIN, RIGHT UPPER QUADRANT: ICD-10-CM

## 2025-06-23 PROCEDURE — 76705 ECHO EXAM OF ABDOMEN: CPT

## 2025-06-25 ENCOUNTER — MYC MEDICAL ADVICE (OUTPATIENT)
Dept: INTERNAL MEDICINE | Facility: CLINIC | Age: 60
End: 2025-06-25
Payer: COMMERCIAL

## 2025-06-25 ENCOUNTER — RESULTS FOLLOW-UP (OUTPATIENT)
Dept: INTERNAL MEDICINE | Facility: CLINIC | Age: 60
End: 2025-06-25

## 2025-06-25 DIAGNOSIS — K76.0 FATTY LIVER: Primary | ICD-10-CM

## 2025-06-25 DIAGNOSIS — R10.11 RUQ ABDOMINAL PAIN: ICD-10-CM

## 2025-06-26 NOTE — TELEPHONE ENCOUNTER
Sent Surphace message to patient.    Thank you,  Jamar, Triage RN Desi Dumont    10:12 AM 6/26/2025

## 2025-06-30 ENCOUNTER — LAB (OUTPATIENT)
Dept: LAB | Facility: CLINIC | Age: 60
End: 2025-06-30
Payer: COMMERCIAL

## 2025-06-30 DIAGNOSIS — R10.11 RUQ ABDOMINAL PAIN: ICD-10-CM

## 2025-06-30 DIAGNOSIS — K76.0 FATTY LIVER: ICD-10-CM

## 2025-06-30 LAB
ALBUMIN SERPL BCG-MCNC: 4 G/DL (ref 3.5–5.2)
ALP SERPL-CCNC: 52 U/L (ref 40–150)
ALT SERPL W P-5'-P-CCNC: 23 U/L (ref 0–50)
AMYLASE SERPL-CCNC: 36 U/L (ref 28–100)
AST SERPL W P-5'-P-CCNC: 23 U/L (ref 0–45)
BILIRUB SERPL-MCNC: 0.3 MG/DL
BILIRUBIN DIRECT (ROCHE PRO & PURE): 0.14 MG/DL (ref 0–0.45)
LIPASE SERPL-CCNC: 30 U/L (ref 13–60)
PROT SERPL-MCNC: 6.5 G/DL (ref 6.4–8.3)

## 2025-06-30 PROCEDURE — 36415 COLL VENOUS BLD VENIPUNCTURE: CPT

## 2025-06-30 PROCEDURE — 82150 ASSAY OF AMYLASE: CPT

## 2025-06-30 PROCEDURE — 80076 HEPATIC FUNCTION PANEL: CPT

## 2025-06-30 PROCEDURE — 83690 ASSAY OF LIPASE: CPT

## 2025-07-01 ENCOUNTER — RESULTS FOLLOW-UP (OUTPATIENT)
Dept: INTERNAL MEDICINE | Facility: CLINIC | Age: 60
End: 2025-07-01
Payer: COMMERCIAL

## 2025-07-05 ENCOUNTER — HOSPITAL ENCOUNTER (OUTPATIENT)
Dept: CT IMAGING | Facility: CLINIC | Age: 60
Discharge: HOME | End: 2025-07-05
Attending: NURSE PRACTITIONER | Admitting: NURSE PRACTITIONER
Payer: COMMERCIAL

## 2025-07-05 DIAGNOSIS — R10.11 RUQ ABDOMINAL PAIN: ICD-10-CM

## 2025-07-05 PROCEDURE — 74150 CT ABDOMEN W/O CONTRAST: CPT

## 2025-07-21 ENCOUNTER — TELEPHONE (OUTPATIENT)
Dept: PULMONOLOGY | Facility: CLINIC | Age: 60
End: 2025-07-21
Payer: COMMERCIAL

## 2025-07-21 NOTE — TELEPHONE ENCOUNTER
Left Voicemail (1st Attempt) and Sent Mychart (1st Attempt) for the patient to call back and schedule the following:    Appointment type: New Pulm  Provider: NA  Return date: Next Avail  Specialty phone number: [phone]  Additional appointment(s) needed: full pft  Additonal Notes: gen pulm per IP review

## 2025-07-23 NOTE — TELEPHONE ENCOUNTER
Left Voicemail (2nd Attempt) for the patient to call back and schedule the following:    Appointment type: New Pulm  Provider: NA  Return date: Next Avail  Specialty phone number: [phone]  Additional appointment(s) needed: full pft  Additonal Notes: gen pulm per IP review

## 2025-07-23 NOTE — Clinical Note
Future Appointments 7/30/2025  6:20 PM    WW CT 2                   [facility]              [facility] 9/3/2025   2:30 PM    Grazyna Sosa, APRN * ECDERM              [facility] 9/4/2025   2:15 PM    [facility] PFT RM 1              [facility]              [facility] 9/4/2025   4:00 PM    Umu Mata MD        [facility]              [facility]

## 2025-07-30 ENCOUNTER — HOSPITAL ENCOUNTER (OUTPATIENT)
Dept: CT IMAGING | Facility: CLINIC | Age: 60
Discharge: HOME | End: 2025-07-30
Attending: PHYSICIAN ASSISTANT
Payer: COMMERCIAL

## 2025-07-30 DIAGNOSIS — R06.02 SHORTNESS OF BREATH: ICD-10-CM

## 2025-07-30 PROCEDURE — 71250 CT THORAX DX C-: CPT

## 2025-09-03 ENCOUNTER — OFFICE VISIT (OUTPATIENT)
Dept: DERMATOLOGY | Facility: CLINIC | Age: 60
End: 2025-09-03
Payer: COMMERCIAL

## 2025-09-03 DIAGNOSIS — D22.9 MULTIPLE BENIGN NEVI: ICD-10-CM

## 2025-09-03 DIAGNOSIS — L73.2 HIDRADENITIS SUPPURATIVA: Primary | ICD-10-CM

## 2025-09-03 DIAGNOSIS — L81.4 LENTIGINES: ICD-10-CM

## 2025-09-03 DIAGNOSIS — D18.01 CHERRY ANGIOMA: ICD-10-CM

## 2025-09-03 DIAGNOSIS — Z86.006 HX OF MELANOMA IN SITU: ICD-10-CM

## 2025-09-03 DIAGNOSIS — Z12.83 ENCOUNTER FOR SCREENING FOR MALIGNANT NEOPLASM OF SKIN: ICD-10-CM

## 2025-09-03 DIAGNOSIS — L82.1 SEBORRHEIC KERATOSES: ICD-10-CM

## 2025-09-03 PROCEDURE — 99213 OFFICE O/P EST LOW 20 MIN: CPT | Performed by: NURSE PRACTITIONER

## 2025-09-03 RX ORDER — CLINDAMYCIN PHOSPHATE 10 UG/ML
LOTION TOPICAL
Qty: 60 ML | Refills: 11 | Status: SHIPPED | OUTPATIENT
Start: 2025-09-03